# Patient Record
Sex: MALE | Race: WHITE | Employment: OTHER | URBAN - METROPOLITAN AREA
[De-identification: names, ages, dates, MRNs, and addresses within clinical notes are randomized per-mention and may not be internally consistent; named-entity substitution may affect disease eponyms.]

---

## 2017-05-12 PROBLEM — N18.30 CHRONIC KIDNEY DISEASE, STAGE III (MODERATE) (HCC): Chronic | Status: ACTIVE | Noted: 2017-05-12

## 2019-03-24 ENCOUNTER — HOSPITAL ENCOUNTER (EMERGENCY)
Age: 79
Discharge: HOME | End: 2019-03-24
Attending: EMERGENCY MEDICINE
Payer: OTHER GOVERNMENT

## 2019-03-24 VITALS
TEMPERATURE: 99 F | HEIGHT: 70 IN | RESPIRATION RATE: 16 BRPM | DIASTOLIC BLOOD PRESSURE: 82 MMHG | HEART RATE: 80 BPM | BODY MASS INDEX: 30.92 KG/M2 | OXYGEN SATURATION: 98 % | WEIGHT: 216 LBS | SYSTOLIC BLOOD PRESSURE: 172 MMHG

## 2019-03-24 DIAGNOSIS — H57.89 PERIORBITAL SWELLING: Primary | ICD-10-CM

## 2019-03-24 DIAGNOSIS — T78.40XA ALLERGIC REACTION, INITIAL ENCOUNTER: ICD-10-CM

## 2019-03-24 PROCEDURE — 96372 THER/PROPH/DIAG INJ SC/IM: CPT

## 2019-03-24 PROCEDURE — 6370000000 HC RX 637 (ALT 250 FOR IP): Performed by: EMERGENCY MEDICINE

## 2019-03-24 PROCEDURE — 99282 EMERGENCY DEPT VISIT SF MDM: CPT

## 2019-03-24 PROCEDURE — 6360000002 HC RX W HCPCS: Performed by: EMERGENCY MEDICINE

## 2019-03-24 RX ORDER — METHYLPREDNISOLONE SODIUM SUCCINATE 125 MG/2ML
60 INJECTION, POWDER, LYOPHILIZED, FOR SOLUTION INTRAMUSCULAR; INTRAVENOUS ONCE
Status: COMPLETED | OUTPATIENT
Start: 2019-03-24 | End: 2019-03-24

## 2019-03-24 RX ORDER — FAMOTIDINE 20 MG/1
20 TABLET, FILM COATED ORAL ONCE
Status: COMPLETED | OUTPATIENT
Start: 2019-03-24 | End: 2019-03-24

## 2019-03-24 RX ORDER — CLOPIDOGREL BISULFATE 75 MG/1
75 TABLET ORAL DAILY
COMMUNITY
End: 2022-01-25

## 2019-03-24 RX ADMIN — METHYLPREDNISOLONE SODIUM SUCCINATE 60 MG: 125 INJECTION, POWDER, FOR SOLUTION INTRAMUSCULAR; INTRAVENOUS at 14:06

## 2019-03-24 RX ADMIN — FAMOTIDINE 20 MG: 20 TABLET, FILM COATED ORAL at 14:06

## 2020-02-11 ENCOUNTER — OFFICE VISIT (OUTPATIENT)
Dept: NEUROLOGY | Age: 80
End: 2020-02-11
Payer: OTHER GOVERNMENT

## 2020-02-11 VITALS
HEIGHT: 70 IN | DIASTOLIC BLOOD PRESSURE: 86 MMHG | SYSTOLIC BLOOD PRESSURE: 147 MMHG | OXYGEN SATURATION: 97 % | WEIGHT: 223 LBS | HEART RATE: 69 BPM | BODY MASS INDEX: 31.92 KG/M2 | RESPIRATION RATE: 12 BRPM

## 2020-02-11 PROCEDURE — 99205 OFFICE O/P NEW HI 60 MIN: CPT | Performed by: NURSE PRACTITIONER

## 2020-02-11 SDOH — HEALTH STABILITY: MENTAL HEALTH: HOW MANY STANDARD DRINKS CONTAINING ALCOHOL DO YOU HAVE ON A TYPICAL DAY?: 1 OR 2

## 2020-02-11 SDOH — HEALTH STABILITY: MENTAL HEALTH: HOW OFTEN DO YOU HAVE A DRINK CONTAINING ALCOHOL?: MONTHLY OR LESS

## 2020-02-11 NOTE — PROGRESS NOTES
[address] [facility]. Jayshree Dewitt M.D., F.A.C.P. Vidya Johnston, CAMMY, APRN, CNS  Emogene Certain. Gabriela Glynn, MSN, APRN-FNP-C  Jorge Warren MSN, APRN, FNP-C  [address] MSN, APRN, FNP-C  [address], [facility]  [place], [address] [address]  Phone: [phone]  Fax: [phone]       Jony Grajeda is a 78 y.o. right handed male     Patient presents to neurology clinic today for subjective memory deficits. Patient presents to her appointment today alone and was deemed a good historian. Past Medical History:     Past Medical History:   Diagnosis Date    CAD (coronary artery disease)     CHF (congestive heart failure) (HCC)     Chronic atrial fibrillation     on Eliquis    Chronic kidney disease, stage III (moderate) ([facility]) 5/12/2017    COPD (chronic obstructive pulmonary disease) ([address]     Diabetes mellitus ([address]     Essential hypertension 12/12/2015    GERD (gastroesophageal reflux disease)     H. pylori infection     history of    Hx of degenerative disc disease     Hyperlipidemia     MI (myocardial infarction) ([address]     x2-- age 48 and then again in 2007    Osteoarthritis     PFO (patent foramen ovale)     Popliteal cyst     Pulmonary hypertension (HCC)        Past Surgical History:       Past Surgical History:   Procedure Laterality Date    CORONARY ARTERY BYPASS GRAFT      quadruple bypass surgery     MITRAL VALVE REPLACEMENT       Allergies:       Iodine    Medications:     Prior to Admission medications    Medication Sig Start Date End Date Taking? Authorizing Provider   nitroGLYCERIN (NITROSTAT) 0.4 MG SL tablet up to max of 3 total doses.  If no relief after 1 dose, call 911. 5/13/17  Yes Roque Rankin, DO   apixaban (ELIQUIS) 5 MG TABS tablet Take 1 tablet by mouth 2 times daily 5/13/17  Yes Roque Rankin, DO   acetaminophen (TYLENOL) 325 MG tablet Take 325 mg by mouth 4 times daily as needed for Pain   Yes Historical chores and continues to drive. Patient has not mismanage finds, has not forgotten to pay bills, and has not gotten lost while driving. Patient has not forgotten familiar faces of family and friends. Patient has not been in danger to himself or others while cooking or driving. Patient denies swallowing difficulties, headache, dizziness, numbness or tingling of his extremities, or any other neurologic problems. In addition to subjective memory loss, patient has CHF, COPD, CAD, hypertension, hyperlipidemia, chronic A. fib on Eliquis, GERD, diabetes 2, history of heart attack x2, pulmonary hypertension, osteoarthritis and PFO. Patient has history of quadruple bypass after heart attack and mitral valve replacement. Patient's primary care physician is maintained through the [facility]. Patient is a former smoker who quit about 17 years ago. Patient has been  46 years and has no children. Patient says he sleeps 6 to 8 hours waking well rested. Patient drinks 2 glasses of water and 2 glasses of coffee each day. Patient reports a good appetite eating about 2 meals each day. Patient exercises--- walking daily and weightlifting 3-4 times a week. Objective:       BP (!) 147/86 (Site: Left Upper Arm, Position: Sitting, Cuff Size: Medium Adult)   Pulse 69   Resp 12   Ht 5' 9.5\" (1.765 m)   Wt 223 lb (101.2 kg)   SpO2 97%   BMI 32.46 kg/m²     General appearance: alert, appears stated age, cooperative and in no distress  Head: normocephalic, without obvious abnormality, atraumatic  Eyes: conjunctivae/corneas clear; no drainage  Neck: no carotid bruit, supple, symmetrical, trachea midline and thyroid not enlarged, symmetric, no tenderness/mass/nodules  Back: symmetric, no curvature.  ROM normal.   Lungs: clear to auscultation bilaterally; unlabored breaths  Heart: regular rate and rhythm  Abdomen: soft, non-tender; bowel sounds mandeep  Extremities: normal, atraumatic, no cyanosis or edema  Pulses: 2+ and symmetric  Skin:  color, texture, turgor normal--no rashes or lesions    Mental Status: alert and oriented x 4; conversant    Able to draw a clock without difficulty  Able to state current president and 5 past presidents without difficulty    Forgetful--easily distracted which causes difficulty with complex commands  Provides his entire history without difficulty    Appropriate attention/concentration  Intact fundus of knowledge  Memories intact    Speech: no dysarthria  Language: no aphasias---reading, writing, repetition, and object identification intact    Cranial Nerves:  I: smell  intact   II: visual acuity     II: visual fields Full to confrontation   II: pupils PERRL   III,VII: ptosis  left eye   III,IV,VI: extraocular muscles  EOMI without nystagmus   V: mastication Normal   V: facial light touch sensation  Normal   V,VII: corneal reflex     VII: facial muscle function - upper  Normal   VII: facial muscle function - lower Normal   VIII: hearing Normal   IX: soft palate elevation  Normal   IX,X: gag reflex    XI: trapezius strength  5/5   XI: sternocleidomastoid strength 5/5   XI: neck extension strength  5/5   XII: tongue strength  Normal     No suck or grasp reflexes  No masklike feces  No Myerson's  No Gowers    Motor:  5/5 throughout  Normal bulk and tone  No drift   No abnormal movements    Sensory:  LT and PP normal  Vibration normal    Coordination:   FN, FFM and VIVIEN normal  HS normal    Gait:  Normal    DTR:   Right Brachioradialis reflex 1+  Left Brachioradialis reflex 1+  Right Biceps reflex 1+  Left Biceps reflex 1+  Right Triceps reflex 1+  Left Triceps reflex 1+  Right Quadriceps reflex 1+  Left Quadriceps reflex 1+  Right Achilles reflex 1+  Left Achilles reflex 1+    No Garay's    No other pathological reflexes    Laboratory/Radiology:  ry/Radiology:     CBC:   Lab Results   Component Value Date    WBC 11.0 05/11/2017    RBC 3.90 05/11/2017    HGB 11.9 05/11/2017    HCT 36.3 05/11/2017    [facility]

## 2022-01-25 ENCOUNTER — APPOINTMENT (OUTPATIENT)
Dept: GENERAL RADIOLOGY | Age: 82
DRG: 291 | End: 2022-01-25
Payer: OTHER GOVERNMENT

## 2022-01-25 ENCOUNTER — HOSPITAL ENCOUNTER (INPATIENT)
Age: 82
LOS: 13 days | Discharge: HOME | DRG: 291 | End: 2022-02-07
Attending: EMERGENCY MEDICINE | Admitting: FAMILY MEDICINE
Payer: OTHER GOVERNMENT

## 2022-01-25 DIAGNOSIS — R06.00 DYSPNEA, UNSPECIFIED TYPE: ICD-10-CM

## 2022-01-25 DIAGNOSIS — M79.89 LEG SWELLING: ICD-10-CM

## 2022-01-25 DIAGNOSIS — I50.31 ACUTE DIASTOLIC HEART FAILURE (HCC): Primary | ICD-10-CM

## 2022-01-25 DIAGNOSIS — I50.9 ACUTE CONGESTIVE HEART FAILURE, UNSPECIFIED HEART FAILURE TYPE (HCC): ICD-10-CM

## 2022-01-25 LAB
ALBUMIN SERPL-MCNC: 3.5 G/DL (ref 3.5–5.2)
ALP BLD-CCNC: 152 U/L (ref 40–129)
ALT SERPL-CCNC: 9 U/L (ref 0–40)
ANION GAP SERPL CALCULATED.3IONS-SCNC: 8 MMOL/L (ref 7–16)
APTT: 35 SEC (ref 24.5–35.1)
AST SERPL-CCNC: 12 U/L (ref 0–39)
BASOPHILS ABSOLUTE: 0.04 E9/L (ref 0–0.2)
BASOPHILS RELATIVE PERCENT: 0.5 % (ref 0–2)
BILIRUB SERPL-MCNC: 0.7 MG/DL (ref 0–1.2)
BUN BLDV-MCNC: 38 MG/DL (ref 6–23)
CALCIUM SERPL-MCNC: 8.7 MG/DL (ref 8.6–10.2)
CHLORIDE BLD-SCNC: 106 MMOL/L (ref 98–107)
CO2: 27 MMOL/L (ref 22–29)
CREAT SERPL-MCNC: 2.1 MG/DL (ref 0.7–1.2)
EOSINOPHILS ABSOLUTE: 0.46 E9/L (ref 0.05–0.5)
EOSINOPHILS RELATIVE PERCENT: 5.7 % (ref 0–6)
GFR AFRICAN AMERICAN: 37
GFR NON-AFRICAN AMERICAN: 30 ML/MIN/1.73
GLUCOSE BLD-MCNC: 110 MG/DL (ref 74–99)
HCT VFR BLD CALC: 34.1 % (ref 37–54)
HEMOGLOBIN: 10 G/DL (ref 12.5–16.5)
IMMATURE GRANULOCYTES #: 0.04 E9/L
IMMATURE GRANULOCYTES %: 0.5 % (ref 0–5)
INR BLD: 1.5
LYMPHOCYTES ABSOLUTE: 0.9 E9/L (ref 1.5–4)
LYMPHOCYTES RELATIVE PERCENT: 11.2 % (ref 20–42)
MCH RBC QN AUTO: 30 PG (ref 26–35)
MCHC RBC AUTO-ENTMCNC: 29.3 % (ref 32–34.5)
MCV RBC AUTO: 102.4 FL (ref 80–99.9)
MONOCYTES ABSOLUTE: 0.92 E9/L (ref 0.1–0.95)
MONOCYTES RELATIVE PERCENT: 11.4 % (ref 2–12)
NEUTROPHILS ABSOLUTE: 5.71 E9/L (ref 1.8–7.3)
NEUTROPHILS RELATIVE PERCENT: 70.7 % (ref 43–80)
PDW BLD-RTO: 15.6 FL (ref 11.5–15)
PLATELET # BLD: 340 E9/L (ref 130–450)
PMV BLD AUTO: 8.5 FL (ref 7–12)
POTASSIUM REFLEX MAGNESIUM: 4.7 MMOL/L (ref 3.5–5)
PRO-BNP: 6407 PG/ML (ref 0–450)
PROTHROMBIN TIME: 16.5 SEC (ref 9.3–12.4)
RBC # BLD: 3.33 E12/L (ref 3.8–5.8)
SODIUM BLD-SCNC: 141 MMOL/L (ref 132–146)
TOTAL PROTEIN: 7.6 G/DL (ref 6.4–8.3)
TROPONIN, HIGH SENSITIVITY: 50 NG/L (ref 0–11)
WBC # BLD: 8.1 E9/L (ref 4.5–11.5)

## 2022-01-25 PROCEDURE — 6360000002 HC RX W HCPCS: Performed by: EMERGENCY MEDICINE

## 2022-01-25 PROCEDURE — 71045 X-RAY EXAM CHEST 1 VIEW: CPT

## 2022-01-25 PROCEDURE — 96374 THER/PROPH/DIAG INJ IV PUSH: CPT

## 2022-01-25 PROCEDURE — 93005 ELECTROCARDIOGRAM TRACING: CPT | Performed by: EMERGENCY MEDICINE

## 2022-01-25 PROCEDURE — 85025 COMPLETE CBC W/AUTO DIFF WBC: CPT

## 2022-01-25 PROCEDURE — 36415 COLL VENOUS BLD VENIPUNCTURE: CPT

## 2022-01-25 PROCEDURE — 85610 PROTHROMBIN TIME: CPT

## 2022-01-25 PROCEDURE — 99284 EMERGENCY DEPT VISIT MOD MDM: CPT

## 2022-01-25 PROCEDURE — 2060000000 HC ICU INTERMEDIATE R&B

## 2022-01-25 PROCEDURE — 85730 THROMBOPLASTIN TIME PARTIAL: CPT

## 2022-01-25 PROCEDURE — 83880 ASSAY OF NATRIURETIC PEPTIDE: CPT

## 2022-01-25 PROCEDURE — 80053 COMPREHEN METABOLIC PANEL: CPT

## 2022-01-25 PROCEDURE — 84484 ASSAY OF TROPONIN QUANT: CPT

## 2022-01-25 RX ORDER — ISOSORBIDE MONONITRATE 30 MG/1
30 TABLET, EXTENDED RELEASE ORAL DAILY
COMMUNITY

## 2022-01-25 RX ORDER — FUROSEMIDE 10 MG/ML
40 INJECTION INTRAMUSCULAR; INTRAVENOUS ONCE
Status: COMPLETED | OUTPATIENT
Start: 2022-01-25 | End: 2022-01-25

## 2022-01-25 RX ADMIN — FUROSEMIDE 40 MG: 10 INJECTION, SOLUTION INTRAMUSCULAR; INTRAVENOUS at 18:31

## 2022-01-25 ASSESSMENT — PAIN SCALES - WONG BAKER: WONGBAKER_NUMERICALRESPONSE: 2

## 2022-01-25 ASSESSMENT — PAIN DESCRIPTION - LOCATION: LOCATION: OTHER (COMMENT)

## 2022-01-25 ASSESSMENT — PAIN DESCRIPTION - DESCRIPTORS: DESCRIPTORS: PRESSURE

## 2022-01-25 ASSESSMENT — PAIN DESCRIPTION - FREQUENCY: FREQUENCY: CONTINUOUS

## 2022-01-25 ASSESSMENT — PAIN SCALES - GENERAL: PAINLEVEL_OUTOF10: 3

## 2022-01-25 ASSESSMENT — PAIN DESCRIPTION - PAIN TYPE: TYPE: ACUTE PAIN

## 2022-01-25 NOTE — ED PROVIDER NOTES
HPI:  1/25/22,   Time: 6:15 PM LEEANN Rosales is a 80 y.o. male presenting to the ED for leg edema/sob, beginning months ago. The complaint has been persistent, moderate in severity, and worsened by moderate exertion. [facility] em[facility], out of lasix for past 2 weeks, worse, not on home o2. No fever/chills/sweats/n/v/d/cough/congestion/abd pain. Ext up into abd. Nothing makes better    Review of Systems:   Pertinent positives and negatives are stated within HPI, all other systems reviewed and are negative.          --------------------------------------------- PAST HISTORY ---------------------------------------------  Past Medical History:  has a past medical history of CAD (coronary artery disease), CHF (congestive heart failure) ([facility] Chronic atrial fibrillation ([address] Chronic kidney disease, stage III (moderate) ([address] COPD (chronic obstructive pulmonary disease) ([address] Diabetes mellitus ([facility] Essential hypertension, GERD (gastroesophageal reflux disease), H. pylori infection, Hx of degenerative disc disease, Hyperlipidemia, MI (myocardial infarction) ([address] Osteoarthritis, PFO (patent foramen ovale), Popliteal cyst, and Pulmonary hypertension ([address] Past Surgical History:  has a past surgical history that includes Mitral valve replacement and Coronary artery bypass graft. Social History:  reports that he has quit smoking. He has never used smokeless tobacco. He reports current alcohol use of about 1.0 standard drink of alcohol per week. He reports that he does not use drugs. Family History: family history is not on file. The patients home medications have been reviewed.     Allergies: Iodine        ---------------------------------------------------PHYSICAL EXAM--------------------------------------    Constitutional/General: Alert and oriented x3, well appearing, non toxic in NAD  Head: Normocephalic and atraumatic  Eyes: PERRL, EOMI, conjunctive normal, sclera non icteric  Mouth: Oropharynx clear, handling secretions, no trismus, no asymmetry of the posterior oropharynx or uvular edema  Neck: Supple, full ROM, non tender to palpation in the midline, no stridor, no crepitus, no meningeal signs, pos jvd  Respiratory: crackles base fidencio, tachypnic. Cardiovascular:  Regular rate. Regular rhythm. No murmurs, gallops, or rubs. 2+ distal pulses  Chest: No chest wall tenderness  GI:  Abdomen Soft, Non tender, Non distended. +BS. No organomegaly, no palpable masses,  No rebound, guarding, or rigidity. Musculoskeletal: Moves all extremities x 4. Warm and well perfused, no clubbing, cyanosis, 4+ pedal edema. Capillary refill <3 seconds  Integument: skin warm and dry. No rashes. Lymphatic: no lymphadenopathy noted  Neurologic: GCS 15, no focal deficits,  Psychiatric: Normal Affect    -------------------------------------------------- RESULTS -------------------------------------------------  I have personally reviewed all laboratory and imaging results for this patient. Results are listed below.      LABS:  Results for orders placed or performed during the hospital encounter of 01/25/22   CBC Auto Differential   Result Value Ref Range    WBC 8.1 4.5 - 11.5 E9/L    RBC 3.33 (L) 3.80 - 5.80 E12/L    Hemoglobin 10.0 (L) 12.5 - 16.5 g/dL    Hematocrit 34.1 (L) 37.0 - 54.0 %    .4 (H) 80.0 - 99.9 fL    MCH 30.0 26.0 - 35.0 pg    MCHC 29.3 (L) 32.0 - 34.5 %    RDW 15.6 (H) 11.5 - 15.0 fL    Platelets 186 525 - 231 E9/L    MPV 8.5 7.0 - 12.0 fL    Neutrophils % 70.7 43.0 - 80.0 %    Immature Granulocytes % 0.5 0.0 - 5.0 %    Lymphocytes % 11.2 (L) 20.0 - 42.0 %    Monocytes % 11.4 2.0 - 12.0 %    Eosinophils % 5.7 0.0 - 6.0 %    Basophils % 0.5 0.0 - 2.0 %    Neutrophils Absolute 5.71 1.80 - 7.30 E9/L    Immature Granulocytes # 0.04 E9/L    Lymphocytes Absolute 0.90 (L) 1.50 - 4.00 E9/L    Monocytes Absolute 0.92 0.10 - 0.95 E9/L    Eosinophils Absolute 0.46 0.05 - 0.50 E9/L Basophils Absolute 0.04 0.00 - 0.20 E9/L   Comprehensive Metabolic Panel w/ Reflex to MG   Result Value Ref Range    Sodium 141 132 - 146 mmol/L    Potassium reflex Magnesium 4.7 3.5 - 5.0 mmol/L    Chloride 106 98 - 107 mmol/L    CO2 27 22 - 29 mmol/L    Anion Gap 8 7 - 16 mmol/L    Glucose 110 (H) 74 - 99 mg/dL    BUN 38 (H) 6 - 23 mg/dL    CREATININE 2.1 (H) 0.7 - 1.2 mg/dL    GFR Non-African American 30 >=60 mL/min/1.73    GFR African American 37     Calcium 8.7 8.6 - 10.2 mg/dL    Total Protein 7.6 6.4 - 8.3 g/dL    Albumin 3.5 3.5 - 5.2 g/dL    Total Bilirubin 0.7 0.0 - 1.2 mg/dL    Alkaline Phosphatase 152 (H) 40 - 129 U/L    ALT 9 0 - 40 U/L    AST 12 0 - 39 U/L   Troponin   Result Value Ref Range    Troponin, High Sensitivity 50 (H) 0 - 11 ng/L   Brain Natriuretic Peptide   Result Value Ref Range    Pro-BNP 6,407 (H) 0 - 450 pg/mL   Protime-INR   Result Value Ref Range    Protime 16.5 (H) 9.3 - 12.4 sec    INR 1.5    APTT   Result Value Ref Range    aPTT 35.0 24.5 - 35.1 sec       RADIOLOGY:  Interpreted by Radiologist.  XR CHEST PORTABLE   Final Result   Stable cardiomegaly. No acute findings. EKG:  This EKG is signed and interpreted by the EP. Time: 1826  Rate: 66  Rhythm: Atrial fibrillation  Interpretation: atrial fibrillation (chronic)  Comparison: None      ------------------------- NURSING NOTES AND VITALS REVIEWED ---------------------------   The nursing notes within the ED encounter and vital signs as below have been reviewed by myself. /68   Pulse 69   Temp 98.1 °F (36.7 °C)   Resp 18   Ht 5' 9\" (1.753 m)   Wt 245 lb (111.1 kg)   SpO2 94%   BMI 36.18 kg/m²   Oxygen Saturation Interpretation: Normal    The patients available past medical records and past encounters were reviewed.         ------------------------------ ED COURSE/MEDICAL DECISION MAKING----------------------  Medications   furosemide (LASIX) injection 40 mg (40 mg IntraVENous Given 1/25/22 7626) ED COURSE:       Medical Decision Making:    Pt chf sx, out of diuretics, sig weight gain, will admit for diuresis      This patient's ED course included: a personal history and physicial examination    This patient has remained hemodynamically stable during their ED course. Re-Evaluations:             Re-evaluation. Patients symptoms show no change            Consultations:             sound    Critical Care:         Counseling: The emergency provider has spoken with the patient and discussed todays results, in addition to providing specific details for the plan of care and counseling regarding the diagnosis and prognosis. Questions are answered at this time and they are agreeable with the plan.       --------------------------------- IMPRESSION AND DISPOSITION ---------------------------------    IMPRESSION  1. Leg swelling    2. Dyspnea, unspecified type    3. Acute congestive heart failure, unspecified heart failure type ([facility]        DISPOSITION  Disposition: Admit to telemetry  Patient condition is stable    NOTE: This report was transcribed using voice recognition software.  Every effort was made to ensure accuracy; however, inadvertent computerized transcription errors may be present        Jose Marie MD  01/25/22 2056

## 2022-01-26 LAB
ANION GAP SERPL CALCULATED.3IONS-SCNC: 10 MMOL/L (ref 7–16)
BUN BLDV-MCNC: 36 MG/DL (ref 6–23)
CALCIUM SERPL-MCNC: 8.7 MG/DL (ref 8.6–10.2)
CHLORIDE BLD-SCNC: 105 MMOL/L (ref 98–107)
CHOLESTEROL, TOTAL: 111 MG/DL (ref 0–199)
CO2: 26 MMOL/L (ref 22–29)
CREAT SERPL-MCNC: 2 MG/DL (ref 0.7–1.2)
EKG ATRIAL RATE: 70 BPM
EKG Q-T INTERVAL: 420 MS
EKG QRS DURATION: 98 MS
EKG QTC CALCULATION (BAZETT): 440 MS
EKG R AXIS: 64 DEGREES
EKG T AXIS: -179 DEGREES
EKG VENTRICULAR RATE: 66 BPM
GFR AFRICAN AMERICAN: 39
GFR NON-AFRICAN AMERICAN: 32 ML/MIN/1.73
GLUCOSE BLD-MCNC: 102 MG/DL (ref 74–99)
HBA1C MFR BLD: 6.1 % (ref 4–5.6)
HCT VFR BLD CALC: 34.2 % (ref 37–54)
HDLC SERPL-MCNC: 36 MG/DL
HEMOGLOBIN: 10.2 G/DL (ref 12.5–16.5)
IRON SATURATION: 19 % (ref 20–55)
IRON: 51 MCG/DL (ref 59–158)
LDL CHOLESTEROL CALCULATED: 65 MG/DL (ref 0–99)
MAGNESIUM: 2.3 MG/DL (ref 1.6–2.6)
MCH RBC QN AUTO: 30.4 PG (ref 26–35)
MCHC RBC AUTO-ENTMCNC: 29.8 % (ref 32–34.5)
MCV RBC AUTO: 102.1 FL (ref 80–99.9)
METER GLUCOSE: 119 MG/DL (ref 74–99)
METER GLUCOSE: 93 MG/DL (ref 74–99)
PDW BLD-RTO: 15.5 FL (ref 11.5–15)
PLATELET # BLD: 347 E9/L (ref 130–450)
PMV BLD AUTO: 8.6 FL (ref 7–12)
POTASSIUM SERPL-SCNC: 4.8 MMOL/L (ref 3.5–5)
RBC # BLD: 3.35 E12/L (ref 3.8–5.8)
SODIUM BLD-SCNC: 141 MMOL/L (ref 132–146)
TOTAL IRON BINDING CAPACITY: 271 MCG/DL (ref 250–450)
TRIGL SERPL-MCNC: 51 MG/DL (ref 0–149)
TROPONIN, HIGH SENSITIVITY: 52 NG/L (ref 0–11)
TROPONIN, HIGH SENSITIVITY: 52 NG/L (ref 0–11)
VLDLC SERPL CALC-MCNC: 10 MG/DL
WBC # BLD: 7.9 E9/L (ref 4.5–11.5)

## 2022-01-26 PROCEDURE — 83036 HEMOGLOBIN GLYCOSYLATED A1C: CPT

## 2022-01-26 PROCEDURE — 83550 IRON BINDING TEST: CPT

## 2022-01-26 PROCEDURE — 6370000000 HC RX 637 (ALT 250 FOR IP): Performed by: FAMILY MEDICINE

## 2022-01-26 PROCEDURE — 2700000000 HC OXYGEN THERAPY PER DAY

## 2022-01-26 PROCEDURE — 36415 COLL VENOUS BLD VENIPUNCTURE: CPT

## 2022-01-26 PROCEDURE — 2580000003 HC RX 258: Performed by: FAMILY MEDICINE

## 2022-01-26 PROCEDURE — 80048 BASIC METABOLIC PNL TOTAL CA: CPT

## 2022-01-26 PROCEDURE — 6360000002 HC RX W HCPCS: Performed by: FAMILY MEDICINE

## 2022-01-26 PROCEDURE — 84484 ASSAY OF TROPONIN QUANT: CPT

## 2022-01-26 PROCEDURE — 93010 ELECTROCARDIOGRAM REPORT: CPT | Performed by: INTERNAL MEDICINE

## 2022-01-26 PROCEDURE — 2060000000 HC ICU INTERMEDIATE R&B

## 2022-01-26 PROCEDURE — 99223 1ST HOSP IP/OBS HIGH 75: CPT | Performed by: INTERNAL MEDICINE

## 2022-01-26 PROCEDURE — 85027 COMPLETE CBC AUTOMATED: CPT

## 2022-01-26 PROCEDURE — 83735 ASSAY OF MAGNESIUM: CPT

## 2022-01-26 PROCEDURE — 83540 ASSAY OF IRON: CPT

## 2022-01-26 PROCEDURE — 82962 GLUCOSE BLOOD TEST: CPT

## 2022-01-26 PROCEDURE — 94640 AIRWAY INHALATION TREATMENT: CPT

## 2022-01-26 PROCEDURE — 80061 LIPID PANEL: CPT

## 2022-01-26 RX ORDER — SODIUM CHLORIDE 0.9 % (FLUSH) 0.9 %
5-40 SYRINGE (ML) INJECTION EVERY 12 HOURS SCHEDULED
Status: DISCONTINUED | OUTPATIENT
Start: 2022-01-26 | End: 2022-02-07 | Stop reason: HOSPADM

## 2022-01-26 RX ORDER — FUROSEMIDE 10 MG/ML
40 INJECTION INTRAMUSCULAR; INTRAVENOUS 2 TIMES DAILY
Status: DISCONTINUED | OUTPATIENT
Start: 2022-01-26 | End: 2022-01-28

## 2022-01-26 RX ORDER — ALBUTEROL SULFATE 90 UG/1
2 AEROSOL, METERED RESPIRATORY (INHALATION) 4 TIMES DAILY PRN
Status: DISCONTINUED | OUTPATIENT
Start: 2022-01-26 | End: 2022-01-26 | Stop reason: CLARIF

## 2022-01-26 RX ORDER — ACETAMINOPHEN 650 MG/1
650 SUPPOSITORY RECTAL EVERY 6 HOURS PRN
Status: DISCONTINUED | OUTPATIENT
Start: 2022-01-26 | End: 2022-02-07 | Stop reason: HOSPADM

## 2022-01-26 RX ORDER — SODIUM CHLORIDE 0.9 % (FLUSH) 0.9 %
5-40 SYRINGE (ML) INJECTION PRN
Status: DISCONTINUED | OUTPATIENT
Start: 2022-01-26 | End: 2022-02-07 | Stop reason: HOSPADM

## 2022-01-26 RX ORDER — ONDANSETRON 4 MG/1
4 TABLET, ORALLY DISINTEGRATING ORAL EVERY 8 HOURS PRN
Status: DISCONTINUED | OUTPATIENT
Start: 2022-01-26 | End: 2022-02-07 | Stop reason: HOSPADM

## 2022-01-26 RX ORDER — ALBUTEROL SULFATE 2.5 MG/3ML
2.5 SOLUTION RESPIRATORY (INHALATION) 4 TIMES DAILY PRN
Status: DISCONTINUED | OUTPATIENT
Start: 2022-01-26 | End: 2022-02-07 | Stop reason: HOSPADM

## 2022-01-26 RX ORDER — PANTOPRAZOLE SODIUM 40 MG/1
40 TABLET, DELAYED RELEASE ORAL
Status: DISCONTINUED | OUTPATIENT
Start: 2022-01-26 | End: 2022-02-07 | Stop reason: HOSPADM

## 2022-01-26 RX ORDER — POLYETHYLENE GLYCOL 3350 17 G/17G
17 POWDER, FOR SOLUTION ORAL DAILY PRN
Status: DISCONTINUED | OUTPATIENT
Start: 2022-01-26 | End: 2022-02-07 | Stop reason: HOSPADM

## 2022-01-26 RX ORDER — GLIPIZIDE 5 MG/1
5 TABLET ORAL 2 TIMES DAILY WITH MEALS
Status: DISCONTINUED | OUTPATIENT
Start: 2022-01-26 | End: 2022-01-28

## 2022-01-26 RX ORDER — SODIUM CHLORIDE 9 MG/ML
25 INJECTION, SOLUTION INTRAVENOUS PRN
Status: DISCONTINUED | OUTPATIENT
Start: 2022-01-26 | End: 2022-02-07 | Stop reason: HOSPADM

## 2022-01-26 RX ORDER — INSULIN GLARGINE-YFGN 100 [IU]/ML
60 INJECTION, SOLUTION SUBCUTANEOUS NIGHTLY
Status: DISCONTINUED | OUTPATIENT
Start: 2022-01-26 | End: 2022-01-28

## 2022-01-26 RX ORDER — ISOSORBIDE MONONITRATE 30 MG/1
30 TABLET, EXTENDED RELEASE ORAL DAILY
Status: DISCONTINUED | OUTPATIENT
Start: 2022-01-26 | End: 2022-02-07 | Stop reason: HOSPADM

## 2022-01-26 RX ORDER — AMLODIPINE BESYLATE 10 MG/1
10 TABLET ORAL DAILY
Status: DISCONTINUED | OUTPATIENT
Start: 2022-01-26 | End: 2022-01-28

## 2022-01-26 RX ORDER — ONDANSETRON 2 MG/ML
4 INJECTION INTRAMUSCULAR; INTRAVENOUS EVERY 6 HOURS PRN
Status: DISCONTINUED | OUTPATIENT
Start: 2022-01-26 | End: 2022-02-07 | Stop reason: HOSPADM

## 2022-01-26 RX ORDER — METOPROLOL SUCCINATE 50 MG/1
50 TABLET, EXTENDED RELEASE ORAL DAILY
Status: DISCONTINUED | OUTPATIENT
Start: 2022-01-26 | End: 2022-02-07 | Stop reason: HOSPADM

## 2022-01-26 RX ORDER — IPRATROPIUM BROMIDE AND ALBUTEROL SULFATE 2.5; .5 MG/3ML; MG/3ML
1 SOLUTION RESPIRATORY (INHALATION) 3 TIMES DAILY
Status: DISCONTINUED | OUTPATIENT
Start: 2022-01-26 | End: 2022-02-07 | Stop reason: HOSPADM

## 2022-01-26 RX ORDER — VALSARTAN 40 MG/1
40 TABLET ORAL 2 TIMES DAILY
Status: DISCONTINUED | OUTPATIENT
Start: 2022-01-26 | End: 2022-02-07 | Stop reason: HOSPADM

## 2022-01-26 RX ORDER — ACETAMINOPHEN 325 MG/1
650 TABLET ORAL EVERY 6 HOURS PRN
Status: DISCONTINUED | OUTPATIENT
Start: 2022-01-26 | End: 2022-02-07 | Stop reason: HOSPADM

## 2022-01-26 RX ORDER — DOXAZOSIN 2 MG/1
2 TABLET ORAL DAILY
Status: DISCONTINUED | OUTPATIENT
Start: 2022-01-26 | End: 2022-01-30

## 2022-01-26 RX ADMIN — SODIUM CHLORIDE, PRESERVATIVE FREE 10 ML: 5 INJECTION INTRAVENOUS at 21:10

## 2022-01-26 RX ADMIN — VALSARTAN 40 MG: 80 TABLET, FILM COATED ORAL at 21:10

## 2022-01-26 RX ADMIN — DOXAZOSIN 2 MG: 2 TABLET ORAL at 21:10

## 2022-01-26 RX ADMIN — IPRATROPIUM BROMIDE AND ALBUTEROL SULFATE 3 ML: .5; 2.5 SOLUTION RESPIRATORY (INHALATION) at 12:35

## 2022-01-26 RX ADMIN — FUROSEMIDE 40 MG: 10 INJECTION, SOLUTION INTRAMUSCULAR; INTRAVENOUS at 10:23

## 2022-01-26 RX ADMIN — APIXABAN 2.5 MG: 2.5 TABLET, FILM COATED ORAL at 21:10

## 2022-01-26 RX ADMIN — SODIUM CHLORIDE, PRESERVATIVE FREE 10 ML: 5 INJECTION INTRAVENOUS at 10:26

## 2022-01-26 RX ADMIN — FUROSEMIDE 40 MG: 10 INJECTION, SOLUTION INTRAMUSCULAR; INTRAVENOUS at 16:40

## 2022-01-26 ASSESSMENT — PAIN SCALES - GENERAL
PAINLEVEL_OUTOF10: 0

## 2022-01-26 NOTE — ED NOTES
Sent SBAR to unit spoke with Elissa on unit      [facility]  01/26/22 7997

## 2022-01-26 NOTE — CONSULTS
CHIEF COMPLAINT: SOB/CHF    HISTORY OF PRESENT ILLNESS: Patient is a 80 y.o. male seen at the request of No primary care provider on file. and seen in prior inpatient consult by Dr. Zoltan Perla 5/2017. Patient presents with progressive SOB and edema. No CP or angina. PAST MEDICAL/SURGICAL HISTORY:  1. Coronary artery disease. a. Reported MI at age 48 (the patient denies stenting at that time) -- at [facility].  2.  Status post CABG x4 in 2007 at [facility] -- reports have been requested. 3.  Chronic diastolic CHF. a.  12/12/2015 echocardiogram revealed mild LVH, EF of 55%, with mild prolapse of the anterior mitral valve leaflet, moderate MR, mild TR, moderate pulmonary hypertension with an RVSP of 45 mmHg. 4.  Persistent AFib. a. The patient reported that he had an ablation approximately 4-5 years ago at [facility] in [facility]. The patient was previously on Coumadin therapy. However, he reported that it was discontinued approximately 2-3 years ago due to a \"severe GI bleed. \"  Since that time, the patient has refused anticoagulation. b.  [id number] score of at least 5 (vascular disease, diabetes, age, hypertension). 5.  Hypertension. 6.  Hyperlipidemia, on statin therapy. 7.  Diabetes mellitus. 8.  COPD with no home oxygen. 9.  Remote history of tobacco abuse. He quit about 12 years ago and is a 50-pack-year smoker. 10.  History of tonsillectomy. 11.  History of eyelid surgery.     Past Medical History:   Diagnosis Date    CAD (coronary artery disease)     CHF (congestive heart failure) (HCC)     Chronic atrial fibrillation (HCC)     on Eliquis    Chronic kidney disease, stage III (moderate) (HCC) 5/12/2017    [facility]     Diabetes mellitus [facility]     Essential hypertension 12/12/2015    GERD (gastroesophageal reflux disease)     H. pylori infection     history of    Hx of degenerative disc disease     Hyperlipidemia     [facility]     x2-- age 48 and then again in 2007    Osteoarthritis     PFO (patent foramen ovale)     Popliteal cyst     Pulmonary hypertension (HCC)        Patient Active Problem List   Diagnosis    [facility] mellitus [facility]    CAD (coronary artery disease)    [facility] exacerbation [facility]    Morbid obesity due to excess calories ([facility]    CHF exacerbation ([facility]    Essential hypertension    Pure hypercholesterolemia    Acute on chronic diastolic congestive heart failure (HCC)    Chronic a-fib (HCC)    Chronic kidney disease, stage III (moderate) (HCC)    Heart failure (HCC)    Leg swelling       Allergies   Allergen Reactions    Iodine Hives       Current Facility-Administered Medications   Medication Dose Route Frequency Provider Last Rate Last Admin    albuterol sulfate  (90 Base) MCG/ACT inhaler 2 puff  2 puff Inhalation 4x Daily PRN [facility], DO        amLODIPine (NORVASC) tablet 10 mg  10 mg Oral Daily Lonny Congo, DO        apixaban Estella Foot) tablet 2.5 mg  2.5 mg Oral BID Lonny Congo, DO        glipiZIDE (GLUCOTROL) tablet 5 mg  5 mg Oral BID WC [facility], DO        ipratropium-albuterol (DUONEB) nebulizer solution 3 mL  1 vial Nebulization TID Lonny Congo, DO        isosorbide mononitrate (IMDUR) extended release tablet 30 mg  30 mg Oral Daily Lonny Congo, DO        metoprolol succinate (TOPROL XL) extended release tablet 50 mg  50 mg Oral Daily Lonny Congo, DO        insulin glargine (LANTUS) injection vial 60 Units  60 Units SubCUTAneous Nightly Lonny Congo, DO        pantoprazole (PROTONIX) tablet 40 mg  40 mg Oral QAM AC Lonny Congo, DO        doxazosin (CARDURA) tablet 2 mg  2 mg Oral Daily Lonny Congo, DO        valsartan (DIOVAN) tablet 40 mg  40 mg Oral BID Lonny Congo, DO        sodium chloride flush 0.9 % injection 5-40 mL  5-40 mL IntraVENous 2 times per day [facility], DO   10 mL at 01/26/22 1026  sodium chloride flush 0.9 % injection 5-40 mL  5-40 mL IntraVENous PRN Darlis Puneet, DO        0.9 % sodium chloride infusion  25 mL IntraVENous PRN Darlis Puneet, DO        ondansetron (ZOFRAN-ODT) disintegrating tablet 4 mg  4 mg Oral Q8H PRN Darlis Puneet, DO        Or    ondansetron [facility]) injection 4 mg  4 mg IntraVENous Q6H PRN Darlis Puneet, DO        polyethylene glycol (GLYCOLAX) packet 17 g  17 g Oral Daily PRN Darlis Puneet, DO        acetaminophen (TYLENOL) tablet 650 mg  650 mg Oral Q6H PRN Darlis Puneet, DO        Or    acetaminophen (TYLENOL) suppository 650 mg  650 mg Rectal Q6H PRN Darlis Puneet, DO        perflutren lipid microspheres (DEFINITY) injection 1.65 mg  1.5 mL IntraVENous ONCE PRN Darlis Puneet, DO        furosemide (LASIX) injection 40 mg  40 mg IntraVENous BID Darlis Puneet, DO   40 mg at 01/26/22 1023     Current Outpatient Medications   Medication Sig Dispense Refill    isosorbide mononitrate (IMDUR) 30 MG extended release tablet Take 30 mg by mouth daily      nitroGLYCERIN (NITROSTAT) 0.4 MG SL tablet up to max of 3 total doses.  If no relief after 1 dose, call 911. 25 tablet 3    apixaban (ELIQUIS) 5 MG TABS tablet Take 1 tablet by mouth 2 times daily (Patient taking differently: Take 2.5 mg by mouth 2 times daily ) 60 tablet 0    acetaminophen (TYLENOL) 325 MG tablet Take 325 mg by mouth 4 times daily as needed for Pain      diphenhydrAMINE (BENADRYL) 25 MG capsule Take 25 mg by mouth as needed for Itching (itchy hands and feet)      albuterol sulfate HFA (PROAIR HFA) 108 (90 BASE) MCG/ACT inhaler Inhale 2 puffs into the lungs 4 times daily as needed for Wheezing or Shortness of Breath      ipratropium-albuterol (DUONEB) 0.5-2.5 (3) MG/3ML SOLN nebulizer solution Take 1 vial by nebulization 3 times daily      gabapentin (NEURONTIN) 100 MG capsule Take 200 mg by mouth 3 times daily      terazosin (HYTRIN) 2 MG capsule Take 2 mg by mouth nightly      valsartan (DIOVAN) 40 MG tablet Take 40 mg by mouth 2 times daily      metoprolol succinate (TOPROL XL) 100 MG extended release tablet Take 50 mg by mouth daily       amLODIPine (NORVASC) 10 MG tablet Take 10 mg by mouth daily       ergocalciferol (ERGOCALCIFEROL) 42835 UNITS capsule Take 50,000 Units by mouth Every 4 weeks      omeprazole (PRILOSEC) 20 MG capsule Take 40 mg by mouth Daily       insulin glargine (LANTUS) 100 UNIT/ML injection vial Inject 60 Units into the skin nightly      Multiple Vitamins-Minerals (THERAPEUTIC MULTIVITAMIN-MINERALS) tablet Take 1 tablet by mouth daily      aspirin 81 MG EC tablet Take 81 mg by mouth daily      glipiZIDE (GLUCOTROL) 5 MG tablet Take 5 mg by mouth 2 times daily (with meals)         Social History     Socioeconomic History    Marital status:      Spouse name: Not on file    Number of children: Not on file    Years of education: Not on file    Highest education level: Not on file   Occupational History    Not on file   Tobacco Use    Smoking status: Former Smoker    Smokeless tobacco: Never Used    Tobacco comment: quit 17 years   Vaping Use    Vaping Use: Never used   Substance and Sexual Activity    Alcohol use: Yes     Alcohol/week: 1.0 standard drink     Types: 1 Glasses of wine per week     Comment: rarely wine    Drug use: No    Sexual activity: Not on file   Other Topics Concern    Not on file   Social History Narrative    Not on file     Social Determinants of Health     Financial Resource Strain:     Difficulty of Paying Living Expenses: Not on file   Food Insecurity:     Worried About Running Out of Food in the Last Year: Not on file    Nelson of Food in the Last Year: Not on file   Transportation Needs:     Lack of Transportation (Medical): Not on file    Lack of Transportation (Non-Medical):  Not on file   Physical Activity:     Days of Exercise per Week: Not on file    Minutes of Exercise per Session: Not on file Stress:     Feeling of Stress : Not on file   Social Connections:     Frequency of Communication with Friends and Family: Not on file    Frequency of Social Gatherings with Friends and Family: Not on file    Attends Judaism Services: Not on file    Active Member of Clubs or Organizations: Not on file    Attends Club or Organization Meetings: Not on file    Marital Status: Not on file   Intimate Partner Violence:     Fear of Current or Ex-Partner: Not on file    Emotionally Abused: Not on file    Physically Abused: Not on file    Sexually Abused: Not on file   Housing Stability:     Unable to Pay for Housing in the Last Year: Not on file    Number of [place] in the Last Year: Not on file    Unstable Housing in the Last Year: Not on file       History reviewed. No pertinent family history. Review of Systems:   Heart: as above   Lungs: as above   Eyes: denies changes in vision or discharge. Ears: denies changes in hearing or pain. Nose: denies epistaxis or masses   Throat: denies sore throat or trouble swallowing. Neuro: denies numbness, tingling, tremors. Skin: denies rashes or itching. : denies hematuria, dysuria   GI: denies vomiting, diarrhea   Psych: denies mood changed, anxiety, depression. all others negative. Physical Exam   /68   Pulse 76   Temp 98.1 °F (36.7 °C)   Resp 16   Ht 5' 9\" (1.753 m)   Wt 245 lb (111.1 kg)   SpO2 94%   BMI 36.18 kg/m²   Constitutional: Oriented to person, place, and time. Well-developed and well-nourished. No distress. Head: Normocephalic and atraumatic. Eyes: EOM are normal. Pupils are equal, round, and reactive to light. Neck: Normal range of motion. Neck supple. No hepatojugular reflux and no JVD present. Carotid bruit is not present. No tracheal deviation present. No thyromegaly present. Cardiovascular: Normal rate, regular rhythm, normal heart sounds and intact distal pulses.   Exam reveals no gallop and no friction rub.  No murmur heard. Pulmonary/Chest: Effort normal and breath sounds normal. No respiratory distress. No wheezes. No rales. No tenderness. Abdominal: Soft. Bowel sounds are normal. No distension and no mass. No tenderness. No rebound and no guarding. Musculoskeletal: Normal range of motion. No edema and no tenderness. Lymphadenopathy:   No cervical adenopathy. No groin adenopathy. Neurological: Alert and oriented to person, place, and time. Skin: Skin is warm and dry. No rash noted. Not diaphoretic. No erythema. Psychiatric: Normal mood and affect. Behavior is normal.     CBC:   Lab Results   Component Value Date    WBC 7.9 01/26/2022    RBC 3.35 01/26/2022    HGB 10.2 01/26/2022    HCT 34.2 01/26/2022    .1 01/26/2022    MCH 30.4 01/26/2022    MCHC 29.8 01/26/2022    RDW 15.5 01/26/2022     01/26/2022    MPV 8.6 01/26/2022     BMP:   Lab Results   Component Value Date     01/26/2022    K 4.8 01/26/2022    K 4.7 01/25/2022     01/26/2022    CO2 26 01/26/2022    BUN 36 01/26/2022    LABALBU 3.5 01/25/2022    CREATININE 2.0 01/26/2022    CALCIUM 8.7 01/26/2022    GFRAA 39 01/26/2022    LABGLOM 32 01/26/2022     Magnesium:    Lab Results   Component Value Date    MG 2.3 01/26/2022     Cardiac Enzymes:   Lab Results   Component Value Date    CKTOTAL 165 05/11/2017    CKTOTAL 134 05/11/2017    CKMB 1.4 05/11/2017    CKMB 1.3 05/11/2017    TROPHS 52 (H) 01/26/2022    TROPHS 52 (H) 01/26/2022    TROPHS 50 (H) 01/25/2022      PT/INR:    Lab Results   Component Value Date    PROTIME 16.5 01/25/2022    INR 1.5 01/25/2022     TSH:    Lab Results   Component Value Date    TSH 0.548 12/12/2015     Rhythm Strip: atrial fibrillation. EKG:  nonspecific ST and T waves changes, atrial fibrillation.     ASSESSMENT AND PLAN:  Patient Active Problem List   Diagnosis    [facility]    CAD (coronary artery disease)    [facility]    Morbid obesity due to excess calories [facility]    CHF exacerbation ([facility]    Essential hypertension    Pure hypercholesterolemia    Acute on chronic diastolic congestive heart failure (HCC)    Chronic a-fib (HCC)    Chronic kidney disease, stage III (moderate) (HCC)    Heart failure (HCC)    Leg swelling     1. Acute on chronic diastolic CHF:    Echo ordered. Diurese. BB/ARB/IV lasix. 2. CAD-CABG at [facility] 2007/Elevated troponin:    Medically manage. Pharm stress to risk stratify. BB/imdur. Consider statin. No ASA due to eliquis. 3. VHD: Hx of MVP with moderate MR and moderate PH. Echo ordered. Observe. 4. Chronic Afib: Dose adjusted eliquis. BB.     5. CKD: Follow labs. 6. HTN: Observe. 7. Lipids: Statin. 8. DM    9. COPD    10. Anemia: Follow labs. 11. Hx of ARON Bailon D.O.   Cardiologist  Cardiology, [facility]

## 2022-01-26 NOTE — H&P
Hospitalist History & Physical      PCP: No primary care provider on file. Date of Service: Pt seen/examined on 1/25/2022     Chief Complaint:  had concerns including Leg Swelling (sent in from [place] for increased swelling in legs, thighs, & abdomen & SOB w exertion) and Shortness of Breath. History Of Present Illness:    Mr. Marline Menon, a 80y.o. year old male  who  has a past medical history of CAD (coronary artery disease), CHF (congestive heart failure) ([address] Chronic atrial fibrillation ([address] Chronic kidney disease, stage III (moderate) ([address] COPD (chronic obstructive pulmonary disease) ([address] Diabetes mellitus ([address] Essential hypertension, GERD (gastroesophageal reflux disease), H. pylori infection, Hx of degenerative disc disease, Hyperlipidemia, MI (myocardial infarction) ([address] Osteoarthritis, PFO (patent foramen ovale), Popliteal cyst, and Pulmonary hypertension ([address] Patient presented to the emergency department with complaints of shortness of breath and swelling of the legs. Also notes weight gain. Denies fever, chills, nausea, vomiting, chest pain, abdominal pain. Shortness of breath is worse with exertion. Has been out of his Lasix for the last 2 weeks. Vital signs within normal limits and stable. Currently 98% on 2 L nasal cannula. Laboratory studies reveal BUN 38, creatinine 2.1, proBNP 6407, troponin of 50, hemoglobin 10.0. Chest x-ray shows stable cardiomegaly. Bibasilar crackles on exam.  Patient given Lasix. Medicine consulted for admission.       Past Medical History:   Diagnosis Date    CAD (coronary artery disease)     CHF (congestive heart failure) (HCC)     Chronic atrial fibrillation (HCC)     on Eliquis    Chronic kidney disease, stage III (moderate) (HCC) 5/12/2017    COPD (chronic obstructive pulmonary disease) ([address]     Diabetes mellitus ([address]     Essential hypertension 12/12/2015    GERD (gastroesophageal reflux disease)     H. pylori infection history of    Hx of degenerative disc disease     Hyperlipidemia     [facility]) [facility]     x2-- age 48 and then again in 2007    Osteoarthritis     PFO (patent foramen ovale)     Popliteal cyst     Pulmonary hypertension (HCC)        Past Surgical History:   Procedure Laterality Date    CORONARY ARTERY BYPASS GRAFT      quadruple bypass surgery     MITRAL VALVE REPLACEMENT         Prior to Admission medications    Medication Sig Start Date End Date Taking? Authorizing Provider   isosorbide mononitrate (IMDUR) 30 MG extended release tablet Take 30 mg by mouth daily   Yes Historical Provider, MD   nitroGLYCERIN (NITROSTAT) 0.4 MG SL tablet up to max of 3 total doses.  If no relief after 1 dose, call 911. 5/13/17  Yes Pedro Rankin, DO   apixaban (ELIQUIS) 5 MG TABS tablet Take 1 tablet by mouth 2 times daily  Patient taking differently: Take 2.5 mg by mouth 2 times daily  5/13/17  Yes Bk Johnston, DO   acetaminophen (TYLENOL) 325 MG tablet Take 325 mg by mouth 4 times daily as needed for Pain   Yes Historical Provider, MD   diphenhydrAMINE (BENADRYL) 25 MG capsule Take 25 mg by mouth as needed for Itching (itchy hands and feet)   Yes Historical Provider, MD   albuterol sulfate HFA (PROAIR HFA) 108 (90 BASE) MCG/ACT inhaler Inhale 2 puffs into the lungs 4 times daily as needed for Wheezing or Shortness of Breath   Yes Historical Provider, MD   ipratropium-albuterol (DUONEB) 0.5-2.5 (3) MG/3ML SOLN nebulizer solution Take 1 vial by nebulization 3 times daily   Yes Historical Provider, MD   gabapentin (NEURONTIN) 100 MG capsule Take 200 mg by mouth 3 times daily   Yes Historical Provider, MD   terazosin (HYTRIN) 2 MG capsule Take 2 mg by mouth nightly   Yes Historical Provider, MD   valsartan (DIOVAN) 40 MG tablet Take 40 mg by mouth 2 times daily   Yes Historical Provider, MD   metoprolol succinate (TOPROL XL) 100 MG extended release tablet Take 50 mg by mouth daily    Yes Historical Provider, MD   amLODIPine (NORVASC) 10 MG tablet Take 10 mg by mouth daily    Yes Historical Provider, MD   ergocalciferol (ERGOCALCIFEROL) 69449 UNITS capsule Take 50,000 Units by mouth Every 4 weeks   Yes Historical Provider, MD   omeprazole (PRILOSEC) 20 MG capsule Take 40 mg by mouth Daily    Yes Historical Provider, MD   insulin glargine (LANTUS) 100 UNIT/ML injection vial Inject 60 Units into the skin nightly   Yes Historical Provider, MD   Multiple Vitamins-Minerals (THERAPEUTIC MULTIVITAMIN-MINERALS) tablet Take 1 tablet by mouth daily   Yes Historical Provider, MD   aspirin 81 MG EC tablet Take 81 mg by mouth daily   Yes Historical Provider, MD   glipiZIDE (GLUCOTROL) 5 MG tablet Take 5 mg by mouth 2 times daily (with meals)   Yes Historical Provider, MD         Allergies:  Iodine    Social History:    TOBACCO:   reports that he has quit smoking. He has never used smokeless tobacco.  ETOH:   reports current alcohol use of about 1.0 standard drink of alcohol per week. Family History:    Reviewed in detail and negative for DM, CAD, Cancer, CVA. Positive as follows\"  History reviewed. No pertinent family history. REVIEW OF SYSTEMS:   Pertinent positives as noted in the HPI. All other systems reviewed and negative. PHYSICAL EXAM:  /68   Pulse 69   Temp 98.1 °F (36.7 °C)   Resp 18   Ht 5' 9\" (1.753 m)   Wt 245 lb (111.1 kg)   SpO2 94%   BMI 36.18 kg/m²   General appearance: No apparent distress, appears stated age and cooperative. HEENT: Normal cephalic, atraumatic without obvious deformity. Pupils equal, round, and reactive to light. Extra ocular muscles intact. Conjunctivae/corneas clear. Neck: Supple, with full range of motion. No jugular venous distention. Trachea midline. Respiratory: Bibasilar crackles  Cardiovascular: Regular rate and rhythm  Abdomen: Soft, nontender, nondistended  Musculoskeletal: No clubbing, cyanosis, edema of bilateral lower extremities.  Brisk capillary refill. Skin: Normal skin color. No rashes or lesions. Neurologic:  Neurovascularly intact without any focal sensory/motor deficits. Cranial nerves: II-XII intact, grossly non-focal.    Reviewed EKG and CXR personally      CBC:   Recent Labs     01/25/22 1823   WBC 8.1   RBC 3.33*   HGB 10.0*   HCT 34.1*   .4*   RDW 15.6*        BMP:   Recent Labs     01/25/22 1823      K 4.7      CO2 27   BUN 38*   CREATININE 2.1*     LFT:  Recent Labs     01/25/22 1823   PROT 7.6   ALKPHOS 152*   ALT 9   AST 12   BILITOT 0.7     CE:  No results for input(s): Meldon Arrieta in the last 72 hours. PT/INR:   Recent Labs     01/25/22 1823   INR 1.5   APTT 35.0     BNP: No results for input(s): BNP in the last 72 hours. ESR:   Lab Results   Component Value Date    SEDRATE 29 (H) 12/12/2015     CRP:   Lab Results   Component Value Date    CRP 2.2 (H) 12/12/2015     D Dimer: No results found for: DDIMER   Folate and B12:   Lab Results   Component Value Date    LAHYJVNW05 434 12/12/2015   ,   Lab Results   Component Value Date    FOLATE >20.0 12/12/2015     Lactic Acid:   Lab Results   Component Value Date    LACTA 1.2 05/11/2017     Thyroid Studies:   Lab Results   Component Value Date    TSH 0.548 12/12/2015       Oupatient labs:  Lab Results   Component Value Date    TSH 0.548 12/12/2015    INR 1.5 01/25/2022    LABA1C 7.6 (H) 05/11/2017       Urinalysis:  No results found for: NITRU, WBCUA, BACTERIA, RBCUA, BLOODU, SPECGRAV, GLUCOSEU    Imaging:  XR CHEST PORTABLE    Result Date: 1/25/2022  EXAMINATION: ONE XRAY VIEW OF THE CHEST 1/25/2022 7:05 pm COMPARISON: 5/11/7 HISTORY: ORDERING SYSTEM PROVIDED HISTORY: sob/leg edema TECHNOLOGIST PROVIDED HISTORY: Reason for exam:->sob/leg edema What reading provider will be dictating this exam?->CRC FINDINGS: The lungs are without acute focal process. There is no effusion or pneumothorax. Stable cardiomegaly.   The osseous structures are without acute process. Sternotomy wires noted. IMPRESSION: No acute process. Stable cardiomegaly. No acute findings. ASSESSMENT:  -Acute on chronic congestive heart failure  -Acute on chronic renal failure  -Elevated troponin  -Bilateral lower extremity edema  -History of atrial fibrillation  -COPD  -History of coronary artery disease  -Type 2 diabetes      PLAN:  -Admit to medicine   -Consult cardiology  -Telemetry  -Echocardiogram  -Repeat troponin  -Lasix 40 mg twice daily  -Daily weight and I's and O's  -Continue home medications        Diet: No diet orders on file  Code Status: Prior  Surrogate decision maker confirmed with patient:   Extended Emergency Contact Information  Primary Emergency Contact: Alexa Michele  Address: [address] [place] [address] Megan Najjar [address] Phone: [phone]  Mobile Phone: [phone]  Relation: Spouse   needed? No    DVT Prophylaxis: []Lovenox []Heparin []PCD [] [address] []Encouraged ambulation  Disposition: []Med/Surg [] Intermediate [] ICU/CCU  Admit status: [] Observation [] Inpatient     +++++++++++++++++++++++++++++++++++++++++++++++++  Ethel Mcgovern DO  [address]  +++++++++++++++++++++++++++++++++++++++++++++++++  NOTE: This report was transcribed using voice recognition software. Every effort was made to ensure accuracy; however, inadvertent computerized transcription errors may be present.

## 2022-01-26 NOTE — PROGRESS NOTES
Hospitalist Progress Note      SYNOPSIS: Patient admitted on 2022 for <principal problem not specified>  Mr. Mana Moser, a 80y.o. year old male  who  has a past medical history of CAD (coronary artery disease), CHF (congestive heart failure) ([facility] Chronic atrial fibrillation ([address] Chronic kidney disease, stage III (moderate) ([address] COPD (chronic obstructive pulmonary disease) ([facility] Diabetes mellitus ([address] Essential hypertension, GERD (gastroesophageal reflux disease), H. pylori infection, Hx of degenerative disc disease, Hyperlipidemia, MI (myocardial infarction) ([facility] Osteoarthritis, PFO (patent foramen ovale), Popliteal cyst, and Pulmonary hypertension ([facility]    Patient presented to the emergency department with complaints of shortness of breath and swelling of the legs. Also notes weight gain. Denies fever, chills, nausea, vomiting, chest pain, abdominal pain. Shortness of breath is worse with exertion. Has been out of his Lasix for the last 2 weeks. Vital signs within normal limits and stable. Currently 98% on 2 L nasal cannula. Laboratory studies reveal BUN 38, creatinine 2.1, proBNP 6407, troponin of 50, hemoglobin 10.0. Chest x-ray shows stable cardiomegaly. Bibasilar crackles on exam.  Patient given Lasix. Medicine consulted for admission.          SUBJECTIVE:   Patient seen and examined at bedside. He still short of breath but improving overnight with IV diuresis. He denies any chest. Cardiologist ordered stress test  Records reviewed. Stable overnight. No other overnight issues reported. Temp (24hrs), Av.1 °F (36.7 °C), Min:98.1 °F (36.7 °C), Max:98.1 °F (36.7 °C)    DIET: ADULT DIET; Regular;  Low Sodium (2 gm)  CODE: Full Code    Intake/Output Summary (Last 24 hours) at 2022 7365  Last data filed at 2022 1026  Gross per 24 hour   Intake 10 ml   Output --   Net 10 ml       OBJECTIVE:    /68   Pulse 76   Temp 98.1 °F (36.7 °C)   Resp 16   Ht 5' 9\" (1.753 m)   Wt 245 lb (111.1 kg)   SpO2 94%   BMI 36.18 kg/m²     General appearance: No apparent distress, appears stated age and cooperative. HEENT:  Conjunctivae/corneas clear. Neck: Supple. No jugular venous distention. Respiratory: Clear to auscultation bilaterally, normal respiratory effort  Cardiovascular: Regular rate rhythm, normal S1-S2. Bilateral lower extremity edema  Abdomen: Soft, nontender, nondistended  Musculoskeletal: No clubbing, cyanosis, no bilateral lower extremity edema. Brisk capillary refill.    Skin:  No rashes  on visible skin  Neurologic: awake, alert and following commands     Assessment and plan:    #Acute on chronic diastolic CHF:  -Resume IV diuresis with Lasix 40 mg twice daily  -Resume beta-blockers and valsartan  -2D echo ordered  -Cardiology input appreciated     #CAD-CABG at [facility] 2007/Elevated troponin:  -Nuclear stress test ordered by cardiology  -Resume beta-blocker Imdur     #Valvular heart disease    -Hx of MVP with moderate MR and moderate PH.   -Echo pending     #Chronic Afib:   -Rate controlled resume metoprolol and Eliquis     #CKD stage III  -Avoid nephrotoxic agent  -Consult nephrology while diuresing the patient      #Hypertension  -Controlled     #Dyslipidemia     #Type 2 diabetes mellitus  -On oral hypoglycemic and insulin  -Check A1c     #COPD without exacerbation     #Anemia of chronic kidney disease    #History of GI bleed  -Monitor hemoglobin    #DVT prophylaxis patient already on Eliquis      DISPOSITION: To be determined     Medications:  REVIEWED DAILY    Infusion Medications    sodium chloride       Scheduled Medications    amLODIPine  10 mg Oral Daily    apixaban  2.5 mg Oral BID    glipiZIDE  5 mg Oral BID WC    ipratropium-albuterol  1 vial Nebulization TID    isosorbide mononitrate  30 mg Oral Daily    metoprolol succinate  50 mg Oral Daily    insulin glargine-yfgn  60 Units SubCUTAneous Nightly    pantoprazole  40 mg Oral QAM AC    doxazosin  2 mg Oral Daily    valsartan  40 mg Oral BID    sodium chloride flush  5-40 mL IntraVENous 2 times per day    furosemide  40 mg IntraVENous BID     PRN Meds: sodium chloride flush, sodium chloride, ondansetron **OR** ondansetron, polyethylene glycol, acetaminophen **OR** acetaminophen, perflutren lipid microspheres, [START ON 1/27/2022] regadenoson, albuterol    Labs:     Recent Labs     01/25/22  1823 01/26/22  0707   WBC 8.1 7.9   HGB 10.0* 10.2*   HCT 34.1* 34.2*    347       Recent Labs     01/25/22  1823 01/26/22  0225    141   K 4.7 4.8    105   CO2 27 26   BUN 38* 36*   CREATININE 2.1* 2.0*   CALCIUM 8.7 8.7       Recent Labs     01/25/22  1823   PROT 7.6   ALKPHOS 152*   ALT 9   AST 12   BILITOT 0.7       Recent Labs     01/25/22 1823   INR 1.5       No results for input(s): CKTOTAL, TROPONINI in the last 72 hours. Chronic labs:    Lab Results   Component Value Date    CHOL 111 01/26/2022    TRIG 51 01/26/2022    HDL 36 01/26/2022    LDLCALC 65 01/26/2022    TSH 0.548 12/12/2015    INR 1.5 01/25/2022    LABA1C 7.6 (H) 05/11/2017       Radiology: REVIEWED DAILY    +++++++++++++++++++++++++++++++++++++++++++++++++  Nisha Salazar MD  [facility] Physician - [address] [facility], [place]  +++++++++++++++++++++++++++++++++++++++++++++++++  NOTE: This report was transcribed using voice recognition software. Every effort was made to ensure accuracy; however, inadvertent computerized transcription errors may be present.

## 2022-01-26 NOTE — CONSULTS
Nephrology Consult  [facility]  Mario Mancera MD    Cc: arf    HPI:   The pt is an 79 yo male with a pmh of cad sp acb, dm, copd, htn, pfo,  hyperlipidemia, ckd , afib, p htn who presented with sob and swelling and weight gain. He ran out of his lasix 2 months ago. Labs show cr of 2.1, k 4.7, co2 27, bun 38, cr 2.1, ca 8.7, wbc 7.9, hgb 10.2, plt 347. Baseline cr is 1.8 from 5/2017. He has been started on iv lasix q 12. He is on diovan as an outpt.  [facility].     PMH:    Past Medical History:   Diagnosis Date    CAD (coronary artery disease)     CHF (congestive heart failure) ([address]     Chronic atrial fibrillation ([address]     on Eliquis    Chronic kidney disease, stage III (moderate) ([address] 5/12/2017    COPD (chronic obstructive pulmonary disease) ([address]     Diabetes mellitus ([address]     Essential hypertension 12/12/2015    GERD (gastroesophageal reflux disease)     H. pylori infection     history of    Hx of degenerative disc disease     Hyperlipidemia     MI (myocardial infarction) ([address]     x2-- age 48 and then again in 2007    Osteoarthritis     PFO (patent foramen ovale)     Popliteal cyst     Pulmonary hypertension ([address]        Patient Active Problem List   Diagnosis    Diabetes mellitus ([address]    CAD (coronary artery disease)    COPD exacerbation ([address]    Morbid obesity due to excess calories ([address]    CHF exacerbation (HCC)    Essential hypertension    Pure hypercholesterolemia    Acute on chronic diastolic congestive heart failure (HCC)    Chronic a-fib (HCC)    Chronic kidney disease, stage III (moderate) (HCC)    Heart failure (HCC)    Leg swelling       Meds:     amLODIPine  10 mg Oral Daily    apixaban  2.5 mg Oral BID    glipiZIDE  5 mg Oral BID WC    ipratropium-albuterol  1 vial Nebulization TID    isosorbide mononitrate  30 mg Oral Daily    metoprolol succinate  50 mg Oral Daily    insulin glargine-yfgn  60 Units SubCUTAneous Nightly    pantoprazole  40 mg Oral QAM AC    doxazosin  2 mg Oral Daily    valsartan  40 mg Oral BID    sodium chloride flush  5-40 mL IntraVENous 2 times per day    furosemide  40 mg IntraVENous BID        sodium chloride         Meds prn:     sodium chloride flush, sodium chloride, ondansetron **OR** ondansetron, polyethylene glycol, acetaminophen **OR** acetaminophen, perflutren lipid microspheres, [START ON 1/27/2022] regadenoson, albuterol    Meds prior to admission:     No current facility-administered medications on file prior to encounter. Current Outpatient Medications on File Prior to Encounter   Medication Sig Dispense Refill    isosorbide mononitrate (IMDUR) 30 MG extended release tablet Take 30 mg by mouth daily      nitroGLYCERIN (NITROSTAT) 0.4 MG SL tablet up to max of 3 total doses.  If no relief after 1 dose, call 911. 25 tablet 3    apixaban (ELIQUIS) 5 MG TABS tablet Take 1 tablet by mouth 2 times daily (Patient taking differently: Take 2.5 mg by mouth 2 times daily ) 60 tablet 0    acetaminophen (TYLENOL) 325 MG tablet Take 325 mg by mouth 4 times daily as needed for Pain      diphenhydrAMINE (BENADRYL) 25 MG capsule Take 25 mg by mouth as needed for Itching (itchy hands and feet)      albuterol sulfate HFA (PROAIR HFA) 108 (90 BASE) MCG/ACT inhaler Inhale 2 puffs into the lungs 4 times daily as needed for Wheezing or Shortness of Breath      ipratropium-albuterol (DUONEB) 0.5-2.5 (3) MG/3ML SOLN nebulizer solution Take 1 vial by nebulization 3 times daily      gabapentin (NEURONTIN) 100 MG capsule Take 200 mg by mouth 3 times daily      terazosin (HYTRIN) 2 MG capsule Take 2 mg by mouth nightly      valsartan (DIOVAN) 40 MG tablet Take 40 mg by mouth 2 times daily      metoprolol succinate (TOPROL XL) 100 MG extended release tablet Take 50 mg by mouth daily       amLODIPine (NORVASC) 10 MG tablet Take 10 mg by mouth daily       ergocalciferol (ERGOCALCIFEROL) 43902 UNITS capsule Take 50,000 Units by mouth Every 4 weeks      omeprazole (PRILOSEC) 20 MG capsule Take 40 mg by mouth Daily       insulin glargine (LANTUS) 100 UNIT/ML injection vial Inject 60 Units into the skin nightly      Multiple Vitamins-Minerals (THERAPEUTIC MULTIVITAMIN-MINERALS) tablet Take 1 tablet by mouth daily      aspirin 81 MG EC tablet Take 81 mg by mouth daily      glipiZIDE (GLUCOTROL) 5 MG tablet Take 5 mg by mouth 2 times daily (with meals)         Allergies:    Iodine    Social History:     reports that he has quit smoking. He has never used smokeless tobacco. He reports current alcohol use of about 1.0 standard drink of alcohol per week. He reports that he does not use drugs. Family History:     History reviewed. No pertinent family history.     ROS:     General: no fever, chills   Heent: no nasal congestion, sore throat   Resp: no cough, sob , hemoptysis  Cardiac: co sob, wt gain, le edema  Gi: no nausea, vomiting, melena, abd pain, hematemesis  Gu: no hematuria, dysuria   Neruo: no numbness, weakness, headache, blurry vision   Endocrine:  no h/o dm  Derm: no rash , petechia  Heme: no epistaxis, bruising  All other sx negative     Physical Exam:      Patient Vitals for the past 24 hrs:   BP Temp Pulse Resp SpO2 Height Weight   01/26/22 0219 133/68 -- 76 16 94 % -- --   01/25/22 1800 -- -- -- -- 94 % -- --   01/25/22 1751 131/68 98.1 °F (36.7 °C) 69 18 98 % 5' 9\" (1.753 m) 245 lb (111.1 kg)         Intake/Output Summary (Last 24 hours) at 1/26/2022 1613  Last data filed at 1/26/2022 1026  Gross per 24 hour   Intake 10 ml   Output --   Net 10 ml       Constitutional: Patient in no acute distress   Head: normocephalic, atraumatic   Neck: supple, no jvd  Cardiovascular: regular rate and rhythm, no murmurs, gallops, or rubs   Respiratory: decreased at bases  Gastrointestinal: soft, nontender, nondistended, no hepatosplenomegaly  Ext: edema  Neuro:  Skin: dry, no rash   Back: nontender    Data:    Recent Labs     01/25/22  1823 01/26/22  0707   WBC 8.1 7.9   HGB 10.0* 10.2*   HCT 34.1* 34.2*   .4* 102.1*    347       Recent Labs     01/25/22  1823 01/26/22  0225    141   K 4.7 4.8    105   CO2 27 26   CREATININE 2.1* 2.0*   BUN 38* 36*   LABGLOM 30 32   GLUCOSE 110* 102*   CALCIUM 8.7 8.7   MG  --  2.3       No results found for: VITD25    PTH   Date Value Ref Range Status   12/12/2015 100 (H) 15 - 65 pg/mL Final       Recent Labs     01/25/22  1823   ALT 9   AST 12   ALKPHOS 152*   BILITOT 0.7       Recent Labs     01/25/22  1823   LABALBU 3.5       Iron   Date Value Ref Range Status   01/26/2022 51 (L) 59 - 158 mcg/dL Final     TIBC   Date Value Ref Range Status   01/26/2022 271 250 - 450 mcg/dL Final       Vitamin B-12   Date Value Ref Range Status   12/12/2015 434 211 - 946 pg/mL Final       Folate   Date Value Ref Range Status   12/12/2015 >20.0 7.3 - 26.1 ng/mL Final         No results found for: VOL, APPEARANCE, COLORU, LABSPEC, LABPH, LEUKBLD, NITRU, GLUCOSEU, KETUA, UROBILINOGEN, KETUA, UROBILINOGEN, BILIRUBINUR, OCBU    No results found for: STEPHANIE, CREURRAN, MACREATRATIO, OSMOU    No components found for: URIC    No results found for: LIPIDPAN      Assessment and Plan:    1. arf on ckd 3b  Baseline cr 1.8 from 5/2017  Cr 2.1>2  In setting of decompensated heart failure  On lasix q 12  Ran out of home meds  Follow on the diovan  Cr is near baseline    2. Volume overload  H/o mvp mod mr and mod p htn  Continue iv lasix  Follow cr  Card seeing  Awaits echo    3. H/o cad sp acb  Awaits stress    4. htn  Follow on diovan, toprol    5.anemia  Dose monique if hgb <10  Check fe b12 david Smith.  Ben Red MD

## 2022-01-27 ENCOUNTER — APPOINTMENT (OUTPATIENT)
Dept: NUCLEAR MEDICINE | Age: 82
DRG: 291 | End: 2022-01-27
Payer: OTHER GOVERNMENT

## 2022-01-27 ENCOUNTER — APPOINTMENT (OUTPATIENT)
Dept: NON INVASIVE DIAGNOSTICS | Age: 82
DRG: 291 | End: 2022-01-27
Payer: OTHER GOVERNMENT

## 2022-01-27 LAB
ANION GAP SERPL CALCULATED.3IONS-SCNC: 12 MMOL/L (ref 7–16)
BASOPHILS ABSOLUTE: 0.03 E9/L (ref 0–0.2)
BASOPHILS RELATIVE PERCENT: 0.3 % (ref 0–2)
BUN BLDV-MCNC: 37 MG/DL (ref 6–23)
CALCIUM SERPL-MCNC: 9 MG/DL (ref 8.6–10.2)
CHLORIDE BLD-SCNC: 105 MMOL/L (ref 98–107)
CO2: 26 MMOL/L (ref 22–29)
CREAT SERPL-MCNC: 1.8 MG/DL (ref 0.7–1.2)
EOSINOPHILS ABSOLUTE: 0.15 E9/L (ref 0.05–0.5)
EOSINOPHILS RELATIVE PERCENT: 1.5 % (ref 0–6)
FERRITIN: 89 NG/ML
FOLATE: 11.8 NG/ML (ref 4.8–24.2)
GFR AFRICAN AMERICAN: 44
GFR NON-AFRICAN AMERICAN: 36 ML/MIN/1.73
GLUCOSE BLD-MCNC: 110 MG/DL (ref 74–99)
HCT VFR BLD CALC: 33.5 % (ref 37–54)
HEMOGLOBIN: 9.9 G/DL (ref 12.5–16.5)
IMMATURE GRANULOCYTES #: 0.05 E9/L
IMMATURE GRANULOCYTES %: 0.5 % (ref 0–5)
LV EF: 62 %
LVEF MODALITY: NORMAL
LYMPHOCYTES ABSOLUTE: 0.67 E9/L (ref 1.5–4)
LYMPHOCYTES RELATIVE PERCENT: 6.8 % (ref 20–42)
MAGNESIUM: 2.3 MG/DL (ref 1.6–2.6)
MCH RBC QN AUTO: 30.2 PG (ref 26–35)
MCHC RBC AUTO-ENTMCNC: 29.6 % (ref 32–34.5)
MCV RBC AUTO: 102.1 FL (ref 80–99.9)
METER GLUCOSE: 106 MG/DL (ref 74–99)
MONOCYTES ABSOLUTE: 1.13 E9/L (ref 0.1–0.95)
MONOCYTES RELATIVE PERCENT: 11.4 % (ref 2–12)
NEUTROPHILS ABSOLUTE: 7.88 E9/L (ref 1.8–7.3)
NEUTROPHILS RELATIVE PERCENT: 79.5 % (ref 43–80)
PDW BLD-RTO: 15.6 FL (ref 11.5–15)
PLATELET # BLD: 364 E9/L (ref 130–450)
PMV BLD AUTO: 8.9 FL (ref 7–12)
POTASSIUM SERPL-SCNC: 4.4 MMOL/L (ref 3.5–5)
RBC # BLD: 3.28 E12/L (ref 3.8–5.8)
SODIUM BLD-SCNC: 143 MMOL/L (ref 132–146)
VITAMIN B-12: 349 PG/ML (ref 211–946)
WBC # BLD: 9.9 E9/L (ref 4.5–11.5)

## 2022-01-27 PROCEDURE — 6370000000 HC RX 637 (ALT 250 FOR IP): Performed by: FAMILY MEDICINE

## 2022-01-27 PROCEDURE — 3430000000 HC RX DIAGNOSTIC RADIOPHARMACEUTICAL: Performed by: RADIOLOGY

## 2022-01-27 PROCEDURE — 93016 CV STRESS TEST SUPVJ ONLY: CPT | Performed by: INTERNAL MEDICINE

## 2022-01-27 PROCEDURE — 6360000002 HC RX W HCPCS: Performed by: FAMILY MEDICINE

## 2022-01-27 PROCEDURE — 82962 GLUCOSE BLOOD TEST: CPT

## 2022-01-27 PROCEDURE — 82607 VITAMIN B-12: CPT

## 2022-01-27 PROCEDURE — 99233 SBSQ HOSP IP/OBS HIGH 50: CPT | Performed by: INTERNAL MEDICINE

## 2022-01-27 PROCEDURE — 83735 ASSAY OF MAGNESIUM: CPT

## 2022-01-27 PROCEDURE — 80048 BASIC METABOLIC PNL TOTAL CA: CPT

## 2022-01-27 PROCEDURE — 78452 HT MUSCLE IMAGE SPECT MULT: CPT | Performed by: INTERNAL MEDICINE

## 2022-01-27 PROCEDURE — 2580000003 HC RX 258: Performed by: FAMILY MEDICINE

## 2022-01-27 PROCEDURE — 82746 ASSAY OF FOLIC ACID SERUM: CPT

## 2022-01-27 PROCEDURE — 93018 CV STRESS TEST I&R ONLY: CPT | Performed by: INTERNAL MEDICINE

## 2022-01-27 PROCEDURE — 2700000000 HC OXYGEN THERAPY PER DAY

## 2022-01-27 PROCEDURE — 85025 COMPLETE CBC W/AUTO DIFF WBC: CPT

## 2022-01-27 PROCEDURE — 82728 ASSAY OF FERRITIN: CPT

## 2022-01-27 PROCEDURE — 94640 AIRWAY INHALATION TREATMENT: CPT

## 2022-01-27 PROCEDURE — 6360000002 HC RX W HCPCS: Performed by: INTERNAL MEDICINE

## 2022-01-27 PROCEDURE — 78452 HT MUSCLE IMAGE SPECT MULT: CPT

## 2022-01-27 PROCEDURE — A9500 TC99M SESTAMIBI: HCPCS | Performed by: RADIOLOGY

## 2022-01-27 PROCEDURE — 2060000000 HC ICU INTERMEDIATE R&B

## 2022-01-27 PROCEDURE — 36415 COLL VENOUS BLD VENIPUNCTURE: CPT

## 2022-01-27 PROCEDURE — 93017 CV STRESS TEST TRACING ONLY: CPT

## 2022-01-27 RX ADMIN — Medication 12 MILLICURIE: at 09:19

## 2022-01-27 RX ADMIN — IPRATROPIUM BROMIDE AND ALBUTEROL SULFATE 3 ML: .5; 2.5 SOLUTION RESPIRATORY (INHALATION) at 08:16

## 2022-01-27 RX ADMIN — APIXABAN 2.5 MG: 2.5 TABLET, FILM COATED ORAL at 20:58

## 2022-01-27 RX ADMIN — SODIUM CHLORIDE, PRESERVATIVE FREE 10 ML: 5 INJECTION INTRAVENOUS at 08:52

## 2022-01-27 RX ADMIN — ISOSORBIDE MONONITRATE 30 MG: 30 TABLET, EXTENDED RELEASE ORAL at 13:19

## 2022-01-27 RX ADMIN — SODIUM CHLORIDE, PRESERVATIVE FREE 10 ML: 5 INJECTION INTRAVENOUS at 20:58

## 2022-01-27 RX ADMIN — GLIPIZIDE 5 MG: 5 TABLET ORAL at 17:51

## 2022-01-27 RX ADMIN — IPRATROPIUM BROMIDE AND ALBUTEROL SULFATE 3 ML: .5; 2.5 SOLUTION RESPIRATORY (INHALATION) at 20:45

## 2022-01-27 RX ADMIN — AMLODIPINE BESYLATE 10 MG: 10 TABLET ORAL at 13:19

## 2022-01-27 RX ADMIN — PANTOPRAZOLE SODIUM 40 MG: 40 TABLET, DELAYED RELEASE ORAL at 05:26

## 2022-01-27 RX ADMIN — DOXAZOSIN 2 MG: 2 TABLET ORAL at 20:58

## 2022-01-27 RX ADMIN — APIXABAN 2.5 MG: 2.5 TABLET, FILM COATED ORAL at 13:19

## 2022-01-27 RX ADMIN — REGADENOSON 0.4 MG: 0.08 INJECTION, SOLUTION INTRAVENOUS at 10:52

## 2022-01-27 RX ADMIN — METOPROLOL SUCCINATE 50 MG: 50 TABLET, FILM COATED, EXTENDED RELEASE ORAL at 13:19

## 2022-01-27 RX ADMIN — FUROSEMIDE 40 MG: 10 INJECTION, SOLUTION INTRAMUSCULAR; INTRAVENOUS at 08:52

## 2022-01-27 RX ADMIN — FUROSEMIDE 40 MG: 10 INJECTION, SOLUTION INTRAMUSCULAR; INTRAVENOUS at 17:53

## 2022-01-27 RX ADMIN — VALSARTAN 40 MG: 80 TABLET, FILM COATED ORAL at 13:20

## 2022-01-27 RX ADMIN — VALSARTAN 40 MG: 80 TABLET, FILM COATED ORAL at 20:58

## 2022-01-27 ASSESSMENT — PAIN SCALES - GENERAL
PAINLEVEL_OUTOF10: 0

## 2022-01-27 NOTE — PROGRESS NOTES
Hospitalist Progress Note      SYNOPSIS: Patient admitted on 2022 for <principal problem not specified>  Mr. Rose Neely, a 80y.o. year old male  who  has a past medical history of CAD (coronary artery disease), CHF (congestive heart failure) ([facility] Chronic atrial fibrillation ([facility] [facility] Chronic kidney disease, stage III (moderate) ([facility] COPD (chronic obstructive pulmonary disease) ([facility] [facility] Diabetes mellitus ([facility] Essential hypertension, GERD (gastroesophageal reflux disease), H. pylori infection, Hx of degenerative disc disease, Hyperlipidemia, MI (myocardial infarction) ([facility] Osteoarthritis, PFO (patent foramen ovale), Popliteal cyst, and Pulmonary hypertension ([facility]    Patient presented to the emergency department with complaints of shortness of breath and swelling of the legs. Also notes weight gain. Denies fever, chills, nausea, vomiting, chest pain, abdominal pain. Shortness of breath is worse with exertion. Has been out of his Lasix for the last 2 weeks. Vital signs within normal limits and stable. Currently 98% on 2 L nasal cannula. Laboratory studies reveal BUN 38, creatinine 2.1, proBNP 6407, troponin of 50, hemoglobin 10.0. Chest x-ray shows stable cardiomegaly. Bibasilar crackles on exam.  Patient given Lasix. Medicine consulted for admission.          SUBJECTIVE:   Patient seen and examined at bedside. H e is breathing better. Cardiologist ordered stress test for today  Records reviewed. Stable overnight. No other overnight issues reported.    Temp (24hrs), Av.4 °F (36.9 °C), Min:98.1 °F (36.7 °C), Max:98.8 °F (37.1 °C)    DIET: Diet NPO  CODE: Full Code    Intake/Output Summary (Last 24 hours) at 2022 0913  Last data filed at 2022 0532  Gross per 24 hour   Intake 150 ml   Output 2 ml   Net 148 ml       OBJECTIVE:    /78   Pulse 84   Temp 98.8 °F (37.1 °C) (Oral)   Resp 16   Ht 5' 9\" (1.753 m)   Wt 245 lb (111.1 kg)   SpO2 98%   BMI 36.18 kg/m² General appearance: No apparent distress, appears stated age and cooperative. HEENT:  Conjunctivae/corneas clear. Neck: Supple. No jugular venous distention. Respiratory: Clear to auscultation bilaterally, normal respiratory effort  Cardiovascular: Regular rate rhythm, normal S1-S2. Bilateral lower extremity edema  Abdomen: Soft, nontender, nondistended  Musculoskeletal: No clubbing, cyanosis, no bilateral lower extremity edema. Brisk capillary refill.    Skin:  No rashes  on visible skin  Neurologic: awake, alert and following commands     Assessment and plan:    #Acute on chronic diastolic CHF:  -Resume IV diuresis with Lasix 40 mg twice daily  -Resume beta-blockers and valsartan  -2D echo ordered  -Cardiology input appreciated     #CAD-CABG at [facility] 2007/Elevated troponin:  -Nuclear stress test ordered by cardiology  -Resume beta-blocker Imdur     #Valvular heart disease    -Hx of MVP with moderate MR and moderate PH.   -Echo pending     #Chronic Afib:   -Rate controlled resume metoprolol and Eliquis    #Mild acute kidney injury  -Cardiorenal syndrome  -Improved with diuresis    #CKD stage III  -Avoid nephrotoxic agent  -Consult nephrology while diuresing the patient      #Hypertension  -Controlled     #Dyslipidemia     #Type 2 diabetes mellitus  -On oral hypoglycemic and insulin  -A1c 6.1     #COPD without exacerbation     #Anemia of chronic kidney disease    #History of GI bleed  -Monitor hemoglobin    #DVT prophylaxis patient already on Eliquis      DISPOSITION: To be determined     Medications:  REVIEWED DAILY    Infusion Medications    sodium chloride       Scheduled Medications    amLODIPine  10 mg Oral Daily    apixaban  2.5 mg Oral BID    glipiZIDE  5 mg Oral BID WC    ipratropium-albuterol  1 vial Nebulization TID    isosorbide mononitrate  30 mg Oral Daily    metoprolol succinate  50 mg Oral Daily    insulin glargine-yfgn  60 Units SubCUTAneous Nightly    pantoprazole  40 mg Oral QAM AC    doxazosin  2 mg Oral Daily    valsartan  40 mg Oral BID    sodium chloride flush  5-40 mL IntraVENous 2 times per day    furosemide  40 mg IntraVENous BID     PRN Meds: sodium chloride flush, sodium chloride, ondansetron **OR** ondansetron, polyethylene glycol, acetaminophen **OR** acetaminophen, perflutren lipid microspheres, regadenoson, albuterol    Labs:     Recent Labs     01/25/22  1823 01/26/22  0707 01/27/22 0432   WBC 8.1 7.9 9.9   HGB 10.0* 10.2* 9.9*   HCT 34.1* 34.2* 33.5*    347 364       Recent Labs     01/25/22  1823 01/26/22  0225 01/27/22 0432    141 143   K 4.7 4.8 4.4    105 105   CO2 27 26 26   BUN 38* 36* 37*   CREATININE 2.1* 2.0* 1.8*   CALCIUM 8.7 8.7 9.0       Recent Labs     01/25/22 1823   PROT 7.6   ALKPHOS 152*   ALT 9   AST 12   BILITOT 0.7       Recent Labs     01/25/22 1823   INR 1.5       No results for input(s): Alona Earnest in the last 72 hours. Chronic labs:    Lab Results   Component Value Date    CHOL 111 01/26/2022    TRIG 51 01/26/2022    HDL 36 01/26/2022    LDLCALC 65 01/26/2022    TSH 0.548 12/12/2015    INR 1.5 01/25/2022    LABA1C 6.1 (H) 01/26/2022       Radiology: REVIEWED DAILY    +++++++++++++++++++++++++++++++++++++++++++++++++  Zee Alexandre MD  [facility] Physician - [address] [place] [address]  +++++++++++++++++++++++++++++++++++++++++++++++++  NOTE: This report was transcribed using voice recognition software. Every effort was made to ensure accuracy; however, inadvertent computerized transcription errors may be present.

## 2022-01-27 NOTE — PROGRESS NOTES
[facility]  Nephrology Attending Progress Note  Jazmin Banegas MD        SUBJECTIVE:     1/26/22: The pt is an 79 yo male with a pmh of cad sp acb, dm, copd, htn, pfo,  hyperlipidemia, ckd , afib, p htn who presented with sob and swelling and weight gain. He ran out of his lasix 2 months ago. Labs show cr of 2.1, k 4.7, co2 27, bun 38, cr 2.1, ca 8.7, wbc 7.9, hgb 10.2, plt 347. Baseline cr is 1.8 from 5/2017. He has been started on iv lasix q 12. He is on diovan as an outpt.  [facility] a [facility]ist.     1/27: pt seen in room, no cp or sob      PROBLEM LIST:    Patient Active Problem List   Diagnosis    Diabetes mellitus ([address]    CAD (coronary artery disease)    COPD exacerbation ([address]    Morbid obesity due to excess calories ([address]    CHF exacerbation ([address]    Essential hypertension    Pure hypercholesterolemia    Acute on chronic diastolic congestive heart failure (HCC)    Chronic a-fib (HCC)    Chronic kidney disease, stage III (moderate) ([address]    Heart failure ([address]    Leg swelling        PAST MEDICAL HISTORY:    Past Medical History:   Diagnosis Date    CAD (coronary artery disease)     CHF (congestive heart failure) ([address]     Chronic atrial fibrillation ([address]     on Eliquis    Chronic kidney disease, stage III (moderate) ([address] 5/12/2017    COPD (chronic obstructive pulmonary disease) ([address]     Diabetes mellitus ([address]     Essential hypertension 12/12/2015    GERD (gastroesophageal reflux disease)     H. pylori infection     history of    Hx of degenerative disc disease     Hyperlipidemia     MI (myocardial infarction) ([address]     x2-- age 48 and then again in 2007    Osteoarthritis     PFO (patent foramen ovale)     Popliteal cyst     Pulmonary hypertension (HCC)        DIET:    Diet NPO     PHYSICAL EXAM:     Patient Vitals for the past 24 hrs:   BP Temp Temp src Pulse Resp SpO2   01/27/22 0745 132/78 98.8 °F (37.1 °C) Oral 84 16 98 %   01/27/22 0345 134/66 98.2 °F (36.8 °C) Oral 68 16 98 %   01/26/22 2004 132/68 98.1 °F (36.7 °C) Oral 72 18 98 %   01/26/22 1840 (!) 148/86 98.3 °F (36.8 °C) Oral 80 16 99 %   @      Intake/Output Summary (Last 24 hours) at 1/27/2022 1036  Last data filed at 1/27/2022 1030  Gross per 24 hour   Intake 140 ml   Output 552 ml   Net -412 ml         Wt Readings from Last 3 Encounters:   01/25/22 245 lb (111.1 kg)   02/11/20 223 lb (101.2 kg)   03/24/19 216 lb (98 kg)       Constitutional:  Pt is in no acute distress  Head: normocephalic, atraumatic  Neck: no JVD  Cardiovascular: regular rate and rhythm, no murmurs, gallops, or rubs  Respiratory:  No rales, rhochi, or wheezes  Gastrointestinal:  Soft, nontender, nondistended, bowel sounds x 4  Ext: le edema  Skin: dry, no rash  Neuro: aaox3    MEDS (scheduled):    amLODIPine  10 mg Oral Daily    apixaban  2.5 mg Oral BID    glipiZIDE  5 mg Oral BID WC    ipratropium-albuterol  1 vial Nebulization TID    isosorbide mononitrate  30 mg Oral Daily    metoprolol succinate  50 mg Oral Daily    insulin glargine-yfgn  60 Units SubCUTAneous Nightly    pantoprazole  40 mg Oral QAM AC    doxazosin  2 mg Oral Daily    valsartan  40 mg Oral BID    sodium chloride flush  5-40 mL IntraVENous 2 times per day    furosemide  40 mg IntraVENous BID       MEDS (infusions):   sodium chloride         MEDS (prn):  technetium sestamibi, sodium chloride flush, sodium chloride, ondansetron **OR** ondansetron, polyethylene glycol, acetaminophen **OR** acetaminophen, perflutren lipid microspheres, regadenoson, albuterol    DATA:    Recent Labs     01/25/22  1823 01/26/22  0707 01/27/22  0432   WBC 8.1 7.9 9.9   HGB 10.0* 10.2* 9.9*   HCT 34.1* 34.2* 33.5*   .4* 102.1* 102.1*    347 364     Recent Labs     01/25/22  1823 01/26/22  0225 01/27/22  0432    141 143   K 4.7 4.8 4.4    105 105   CO2 27 26 26   BUN 38* 36* 37*   CREATININE 2.1* 2.0* 1.8*   LABGLOM 30 32 36   GLUCOSE 110* 102* 110*   CALCIUM 8.7 8.7 9.0   ALT 9  --   --    AST 12  --   --    BILITOT 0.7  --   --    ALKPHOS 152*  --   --    MG  --  2.3 2.3       Lab Results   Component Value Date    LABALBU 3.5 01/25/2022    LABALBU 4.1 05/11/2017     Lab Results   Component Value Date    TSH 0.548 12/12/2015       Iron Studies  Lab Results   Component Value Date    IRON 51 (L) 01/26/2022    TIBC 271 01/26/2022    FERRITIN 89 01/27/2022     Vitamin B-12   Date Value Ref Range Status   01/27/2022 349 211 - 946 pg/mL Final     Folate   Date Value Ref Range Status   01/27/2022 11.8 4.8 - 24.2 ng/mL Final       No results found for: VITD25  PTH   Date Value Ref Range Status   12/12/2015 100 (H) 15 - 65 pg/mL Final       No components found for: URIC    No results found for: VOL, APPEARANCE, COLORU, LABSPEC, LABPH, LEUKBLD, NITRU, GLUCOSEU, KETUA, UROBILINOGEN, KETUA, UROBILINOGEN, BILIRUBINUR, OCBU    No results found for: [place]      IMPRESSION/RECOMMENDATIONS:      1. arf on ckd 3b  Baseline cr 1.8 from 5/2017  Cr 2.1>2>1.8  In setting of decompensated heart failure  On lasix q 12  Ran out of home meds  Follow on the diovan  Cr is near baseline  Check pth p     2. Volume overload  H/o mvp mod mr and mod p htn  Continue iv lasix  Follow cr  Card seeing  Awaits echo  uo?     3. H/o cad sp acb  Awaits stress     4. htn  Follow on diovan, toprol     5.anemia  Dose monique if hgb <10  Check fe b12 fol        Robert Schuster. Kailee Hills MD                         Revision History                                    Robert Schuster.  Kailee Hills MD

## 2022-01-27 NOTE — PROCEDURES
Lexiscan Stress EKG Report:    Dx CP    Baseline EKG: nonspecific ST and T waves changes, atrial fibrillation. Stress EKG: No ST-T changes. Arrhythmias: None. Symptoms: None. Summary:  Unremarkable lexiscan stress EKG. See separate report for stress perfusion results. Luz Gonzalez D.O.   Cardiologist  Cardiology, [facility]

## 2022-01-27 NOTE — PROGRESS NOTES
CHIEF COMPLAINT: SOB/CHF    HISTORY OF PRESENT ILLNESS: Patient is a 80 y.o. male seen at the request of No primary care provider on file. and seen in prior inpatient consult by Dr. Claritza León 5/2017. Some SOB. No CP. PAST MEDICAL/SURGICAL HISTORY:  1. Coronary artery disease. a. Reported MI at age 48 (the patient denies stenting at that time) -- at [facility].  2.  Status post CABG x4 in 2007 at [facility] -- reports have been requested. 3.  Chronic diastolic CHF. a.  12/12/2015 echocardiogram revealed mild LVH, EF of 55%, with mild prolapse of the anterior mitral valve leaflet, moderate MR, mild TR, moderate pulmonary hypertension with an RVSP of 45 mmHg. 4.  Persistent AFib. a. The patient reported that he had an ablation approximately 4-5 years ago at [facility] in [facility]. The patient was previously on Coumadin therapy. However, he reported that it was discontinued approximately 2-3 years ago due to a \"severe GI bleed. \"  Since that time, the patient has refused anticoagulation. b.  [id number] score of at least 5 (vascular disease, diabetes, age, hypertension). 5.  Hypertension. 6.  Hyperlipidemia, on statin therapy. 7.  Diabetes mellitus. 8.  COPD with no home oxygen. 9.  Remote history of tobacco abuse. He quit about 12 years ago and is a 50-pack-year smoker. 10.  History of tonsillectomy. 11.  History of eyelid surgery.     Past Medical History:   Diagnosis Date    CAD (coronary artery disease)     CHF (congestive heart failure) (HCC)     Chronic atrial fibrillation (HCC)     on Eliquis    Chronic kidney disease, stage III (moderate) (HCC) 5/12/2017    COPD (ch[facility] obstructive [facility] disease) [facility]     Diabetes mellitus ([facility]     Essential hypertension 12/12/2015    GERD (gastroesophageal reflux disease)     H. pylori infection     history of    Hx of degenerative disc disease     Hyperlipidemia     MI (myocardial infarction) ([facility]     x2-- age 48 and then again in 2007    Osteoarthritis     PFO (patent foramen ovale)     Popliteal cyst     Pulmonary hypertension ([address]        Patient Active Problem List   Diagnosis    Diabetes mellitus ([address]    CAD (coronary artery disease)    COPD exacerbation ([address]    Morbid obesity due to excess calories ([address]    [facility]    Essential hypertension    Pure hypercholesterolemia    Acute on chronic diastolic congestive heart failure (HCC)    Chronic a-fib (HCC)    Chronic kidney disease, stage III (moderate) (HCC)    Heart failure (HCC)    Leg swelling       Allergies   Allergen Reactions    Iodine Hives       Current Facility-Administered Medications   Medication Dose Route Frequency Provider Last Rate Last Admin    technetium sestamibi (CARDIOLITE) injection 35 millicurie  35 millicurie IntraVENous ONCE PRN Colleen Slaughter II, MD        amLODIPine (NORVASC) tablet 10 mg  10 mg Oral Daily Amena Fines, DO        apixaban [facility]) tablet 2.5 mg  2.5 mg Oral BID Amena Fines, DO   2.5 mg at 01/26/22 2110    glipiZIDE (GLUCOTROL) tablet 5 mg  5 mg Oral BID WC Amena Fines, DO        ipratropium-albuterol (DUONEB) nebulizer solution 3 mL  1 vial Nebulization TID Amena Fines, DO   3 mL at 01/27/22 0816    isosorbide mononitrate (IMDUR) extended release tablet 30 mg  30 mg Oral Daily Amena Fines, DO        metoprolol succinate (TOPROL XL) extended release tablet 50 mg  50 mg Oral Daily Amena Fines, DO        insulin glargine-yfgn [facility]) injection vial 60 Units  60 Units SubCUTAneous Nightly Amena Fines, DO        pantoprazole (PROTONIX) tablet 40 mg  40 mg Oral QAM AC Amena Fines, DO   40 mg at 01/27/22 0526    doxazosin (CARDURA) tablet 2 mg  2 mg Oral Daily Amena Fines, DO   2 mg at 01/26/22 2110    valsartan (DIOVAN) tablet 40 mg  40 mg Oral BID Amena Fines, DO   40 mg at 01/26/22 2110    sodium chloride flush 0.9 % injection 5-40 mL  5-40 mL IntraVENous 2 times per day Maximo Mask, DO   10 mL at 01/27/22 0775    sodium chloride flush 0.9 % injection 5-40 mL  5-40 mL IntraVENous PRN Maximo Mask, DO        0.9 % sodium chloride infusion  25 mL IntraVENous PRN Maximo Mask, DO        ondansetron (ZOFRAN-ODT) disintegrating tablet 4 mg  4 mg Oral Q8H PRN Maximo Mask, DO        Or    ondansetron [facility]) injection 4 mg  4 mg IntraVENous Q6H PRN Maximo Mask, DO        polyethylene glycol (GLYCOLAX) packet 17 g  17 g Oral Daily PRN Maximo Mask, DO        acetaminophen (TYLENOL) tablet 650 mg  650 mg Oral Q6H PRN Maximo Mask, DO        Or    acetaminophen (TYLENOL) suppository 650 mg  650 mg Rectal Q6H PRN Maximo Mask, DO        perflutren lipid microspheres (DEFINITY) injection 1.65 mg  1.5 mL IntraVENous ONCE PRN Maximo Mask, DO        furosemide (LASIX) injection 40 mg  40 mg IntraVENous BID Maximo Mask, DO   40 mg at 01/27/22 0228    regadenoson (LEXISCAN) injection 0.4 mg  0.4 mg IntraVENous ONCE PRN Pipe Bloodgood, DO        albuterol (PROVENTIL) nebulizer solution 2.5 mg  2.5 mg Nebulization 4x Daily PRN Maximo Mask, DO           Social History     Socioeconomic History    Marital status:      Spouse name: Not on file    Number of children: Not on file    Years of education: Not on file    Highest education level: Not on file   Occupational History    Not on file   Tobacco Use    Smoking status: Former Smoker    Smokeless tobacco: Never Used    Tobacco comment: quit 17 years   Vaping Use    Vaping Use: Never used   Substance and Sexual Activity    Alcohol use:  Yes     Alcohol/week: 1.0 standard drink     Types: 1 Glasses of wine per week     Comment: rarely wine    Drug use: No    Sexual activity: Not on file   Other Topics Concern    Not on file   Social History Narrative    Not on file     Social Determinants of Health     Financial Resource Strain:     Difficulty of Paying Living Expenses: Not on file   Food Insecurity:     Worried About Running Out of Food in the Last Year: Not on file    Nelson of Food in the Last Year: Not on file   Transportation Needs:     Lack of Transportation (Medical): Not on file    Lack of Transportation (Non-Medical): Not on file   Physical Activity:     Days of Exercise per Week: Not on file    Minutes of Exercise per Session: Not on file   Stress:     Feeling of Stress : Not on file   Social Connections:     Frequency of Communication with Friends and Family: Not on file    Frequency of Social Gatherings with Friends and Family: Not on file    Attends Hindu Services: Not on file    Active Member of [address] [place] [address] [employer] or Organizations: Not on file    Attends Club or Organization Meetings: Not on file    Marital Status: Not on file   Intimate Partner Violence:     Fear of Current or Ex-Partner: Not on file    Emotionally Abused: Not on file    Physically Abused: Not on file    Sexually Abused: Not on file   Housing Stability:     Unable to Pay for Housing in the Last Year: Not on file    Number of [place] in the Last Year: Not on file    Unstable Housing in the Last Year: Not on file       History reviewed. No pertinent family history. Review of Systems:   Heart: as above   Lungs: as above   Eyes: denies changes in vision or discharge. Ears: denies changes in hearing or pain. Nose: denies epistaxis or masses   Throat: denies sore throat or trouble swallowing. Neuro: denies numbness, tingling, tremors. Skin: denies rashes or itching. : denies hematuria, dysuria   GI: denies vomiting, diarrhea   Psych: denies mood changed, anxiety, depression. all others negative. Physical Exam   /78   Pulse 84   Temp 98.8 °F (37.1 °C) (Oral)   Resp 16   Ht 5' 9\" (1.753 m)   Wt 245 lb (111.1 kg)   SpO2 98%   BMI 36.18 kg/m²   Constitutional: Oriented to person, place, and time. Well-developed and well-nourished. No distress.     Head: Normocephalic and atraumatic. Eyes: EOM are normal. Pupils are equal, round, and reactive to light. Neck: Normal range of motion. Neck supple. No hepatojugular reflux and no JVD present. Carotid bruit is not present. No tracheal deviation present. No thyromegaly present. Cardiovascular: Normal rate, regular rhythm, normal heart sounds and intact distal pulses. Exam reveals no gallop and no friction rub. No murmur heard. Pulmonary/Chest: Effort normal and breath sounds normal. No respiratory distress. No wheezes. No rales. No tenderness. Abdominal: Soft. Bowel sounds are normal. No distension and no mass. No tenderness. No rebound and no guarding. Musculoskeletal: Normal range of motion. No edema and no tenderness. Lymphadenopathy:   No cervical adenopathy. No groin adenopathy. Neurological: Alert and oriented to person, place, and time. Skin: Skin is warm and dry. No rash noted. Not diaphoretic. No erythema. Psychiatric: Normal mood and affect.  Behavior is normal.     CBC:   Lab Results   Component Value Date    WBC 9.9 01/27/2022    RBC 3.28 01/27/2022    HGB 9.9 01/27/2022    HCT 33.5 01/27/2022    .1 01/27/2022    MCH 30.2 01/27/2022    MCHC 29.6 01/27/2022    RDW 15.6 01/27/2022     01/27/2022    MPV 8.9 01/27/2022     BMP:   Lab Results   Component Value Date     01/27/2022    K 4.4 01/27/2022    K 4.7 01/25/2022     01/27/2022    CO2 26 01/27/2022    BUN 37 01/27/2022    LABALBU 3.5 01/25/2022    CREATININE 1.8 01/27/2022    CALCIUM 9.0 01/27/2022    GFRAA 44 01/27/2022    LABGLOM 36 01/27/2022     Magnesium:    Lab Results   Component Value Date    MG 2.3 01/27/2022     Cardiac Enzymes:   Lab Results   Component Value Date    CKTOTAL 165 05/11/2017    CKTOTAL 134 05/11/2017    CKMB 1.4 05/11/2017    CKMB 1.3 05/11/2017    TROPHS 52 (H) 01/26/2022    TROPHS 52 (H) 01/26/2022    TROPHS 50 (H) 01/25/2022      PT/INR:    Lab Results   Component Value Date    PROTIME 16.5 01/25/2022 INR 1.5 01/25/2022     TSH:    Lab Results   Component Value Date    TSH 0.548 12/12/2015     Rhythm Strip: atrial fibrillation. ASSESSMENT AND PLAN:  Patient Active Problem List   Diagnosis    Diabetes mellitus ([address]    CAD (coronary artery disease)    COPD exacerbation ([address]    Morbid obesity due to excess calories ([address]    [facility]    Essential hypertension    Pure hypercholesterolemia    Acute on chronic diastolic congestive heart failure (HCC)    Chronic a-fib (HCC)    Chronic kidney disease, stage III (moderate) (HCC)    Heart failure (HCC)    Leg swelling     1. Acute on chronic diastolic CHF:    Echo ordered. Diurese. BB/ARB/IV lasix. 2. CAD-CABG at [facility] 2007/Elevated troponin:    Medically manage. Pharm stress to risk stratify today. BB/imdur. Consider statin. No ASA due to eliquis. 3. VHD: Hx of MVP with moderate MR and moderate PH. Echo ordered. Observe. 4. Chronic Afib: Dose adjusted eliquis. BB.     5. CKD: Follow labs. 6. HTN: Observe. 7. Lipids: Statin. 8. DM    9. COPD    10. Anemia: Follow labs. 11. Hx of GIB    Hannah Joyce D.O.   Cardiologist  Cardiology, [facility]

## 2022-01-27 NOTE — CARE COORDINATION
Care Coordination   [facility] on [facility]ion and weight gain. The patine states he has been out of his Lasix for the past 2 weeks. He was started on iv lasix  40 mg iv bid, 2decho is ordered. Labs b/c 38/2.1. bnp is 6407, troponin is 50. Dr Joseph Ban on c/s and the patient went for a stress test and is showed a large defect in the inferior wall and a non reversible defect of the anterior part of the heart. He is on Diovan  40 mg po bid. I went in and spoke to the patient  And he lives with his wife in a trailer 3 steps to enter. Dme his wife has a nebulizer but no o2  At home and he is on 2 liters n/c on currently attempt to wean it off if possible. He said he is very weak getting up to the bathroom. Pt Ot has been ordered. He has never been to [facility] or [facility]. [facility] and [facility] for [facility] place. I will await pt ot evals and if 02 2l cannot been weaned we will need  An ambultory pulse ox test prior to discharge to see if he needs home o2. He has no Dme preference at this time. I will await pt ot evals. He wants to return home with his wife . [facility]  I will follow

## 2022-01-27 NOTE — PLAN OF CARE
Patient's chart updated to reflect:      . - HF care plan, HF education points and HF discharge instructions.  -Orders: 2 gram sodium diet, daily weights, I/O.  -PCP and cardiology follow up appointments to be scheduled within 7 days of hospital discharge. -CHF education session will be provided to the patient prior to hospital discharge.     Meeta Jaimes RN RN, BSN  Heart Failure Navigator

## 2022-01-28 LAB
ANION GAP SERPL CALCULATED.3IONS-SCNC: 12 MMOL/L (ref 7–16)
BASOPHILS ABSOLUTE: 0.02 E9/L (ref 0–0.2)
BASOPHILS RELATIVE PERCENT: 0.3 % (ref 0–2)
BUN BLDV-MCNC: 36 MG/DL (ref 6–23)
CALCIUM SERPL-MCNC: 8.9 MG/DL (ref 8.6–10.2)
CHLORIDE BLD-SCNC: 104 MMOL/L (ref 98–107)
CO2: 25 MMOL/L (ref 22–29)
CREAT SERPL-MCNC: 2 MG/DL (ref 0.7–1.2)
EKG ATRIAL RATE: 64 BPM
EKG Q-T INTERVAL: 348 MS
EKG QRS DURATION: 106 MS
EKG QTC CALCULATION (BAZETT): 411 MS
EKG R AXIS: 37 DEGREES
EKG T AXIS: -143 DEGREES
EKG VENTRICULAR RATE: 84 BPM
EOSINOPHILS ABSOLUTE: 0.23 E9/L (ref 0.05–0.5)
EOSINOPHILS RELATIVE PERCENT: 3.1 % (ref 0–6)
GFR AFRICAN AMERICAN: 39
GFR NON-AFRICAN AMERICAN: 32 ML/MIN/1.73
GLUCOSE BLD-MCNC: 70 MG/DL (ref 74–99)
HCT VFR BLD CALC: 31 % (ref 37–54)
HEMOGLOBIN: 9.3 G/DL (ref 12.5–16.5)
IMMATURE GRANULOCYTES #: 0.03 E9/L
IMMATURE GRANULOCYTES %: 0.4 % (ref 0–5)
LYMPHOCYTES ABSOLUTE: 0.76 E9/L (ref 1.5–4)
LYMPHOCYTES RELATIVE PERCENT: 10.3 % (ref 20–42)
MAGNESIUM: 2.2 MG/DL (ref 1.6–2.6)
MCH RBC QN AUTO: 30.2 PG (ref 26–35)
MCHC RBC AUTO-ENTMCNC: 30 % (ref 32–34.5)
MCV RBC AUTO: 100.6 FL (ref 80–99.9)
METER GLUCOSE: 151 MG/DL (ref 74–99)
METER GLUCOSE: 74 MG/DL (ref 74–99)
METER GLUCOSE: 75 MG/DL (ref 74–99)
METER GLUCOSE: 87 MG/DL (ref 74–99)
MONOCYTES ABSOLUTE: 1.05 E9/L (ref 0.1–0.95)
MONOCYTES RELATIVE PERCENT: 14.2 % (ref 2–12)
NEUTROPHILS ABSOLUTE: 5.32 E9/L (ref 1.8–7.3)
NEUTROPHILS RELATIVE PERCENT: 71.7 % (ref 43–80)
PARATHYROID HORMONE INTACT: 78 PG/ML (ref 15–65)
PDW BLD-RTO: 15.7 FL (ref 11.5–15)
PHOSPHORUS: 3.7 MG/DL (ref 2.5–4.5)
PLATELET # BLD: 314 E9/L (ref 130–450)
PMV BLD AUTO: 8.7 FL (ref 7–12)
POTASSIUM SERPL-SCNC: 3.8 MMOL/L (ref 3.5–5)
PRO-BNP: ABNORMAL PG/ML (ref 0–450)
RBC # BLD: 3.08 E12/L (ref 3.8–5.8)
SODIUM BLD-SCNC: 141 MMOL/L (ref 132–146)
TROPONIN, HIGH SENSITIVITY: 53 NG/L (ref 0–11)
WBC # BLD: 7.4 E9/L (ref 4.5–11.5)

## 2022-01-28 PROCEDURE — 93005 ELECTROCARDIOGRAM TRACING: CPT | Performed by: INTERNAL MEDICINE

## 2022-01-28 PROCEDURE — 97165 OT EVAL LOW COMPLEX 30 MIN: CPT

## 2022-01-28 PROCEDURE — 36415 COLL VENOUS BLD VENIPUNCTURE: CPT

## 2022-01-28 PROCEDURE — 6360000002 HC RX W HCPCS: Performed by: FAMILY MEDICINE

## 2022-01-28 PROCEDURE — 2500000003 HC RX 250 WO HCPCS: Performed by: INTERNAL MEDICINE

## 2022-01-28 PROCEDURE — 6370000000 HC RX 637 (ALT 250 FOR IP): Performed by: INTERNAL MEDICINE

## 2022-01-28 PROCEDURE — 2060000000 HC ICU INTERMEDIATE R&B

## 2022-01-28 PROCEDURE — 80048 BASIC METABOLIC PNL TOTAL CA: CPT

## 2022-01-28 PROCEDURE — 83970 ASSAY OF PARATHORMONE: CPT

## 2022-01-28 PROCEDURE — 84100 ASSAY OF PHOSPHORUS: CPT

## 2022-01-28 PROCEDURE — 97535 SELF CARE MNGMENT TRAINING: CPT

## 2022-01-28 PROCEDURE — 83735 ASSAY OF MAGNESIUM: CPT

## 2022-01-28 PROCEDURE — 6370000000 HC RX 637 (ALT 250 FOR IP): Performed by: FAMILY MEDICINE

## 2022-01-28 PROCEDURE — 99233 SBSQ HOSP IP/OBS HIGH 50: CPT | Performed by: INTERNAL MEDICINE

## 2022-01-28 PROCEDURE — 2580000003 HC RX 258: Performed by: INTERNAL MEDICINE

## 2022-01-28 PROCEDURE — 85025 COMPLETE CBC W/AUTO DIFF WBC: CPT

## 2022-01-28 PROCEDURE — 84484 ASSAY OF TROPONIN QUANT: CPT

## 2022-01-28 PROCEDURE — 93010 ELECTROCARDIOGRAM REPORT: CPT | Performed by: INTERNAL MEDICINE

## 2022-01-28 PROCEDURE — 94640 AIRWAY INHALATION TREATMENT: CPT

## 2022-01-28 PROCEDURE — 82962 GLUCOSE BLOOD TEST: CPT

## 2022-01-28 PROCEDURE — 83880 ASSAY OF NATRIURETIC PEPTIDE: CPT

## 2022-01-28 PROCEDURE — 6360000002 HC RX W HCPCS: Performed by: INTERNAL MEDICINE

## 2022-01-28 PROCEDURE — 97530 THERAPEUTIC ACTIVITIES: CPT

## 2022-01-28 PROCEDURE — 2580000003 HC RX 258: Performed by: FAMILY MEDICINE

## 2022-01-28 PROCEDURE — 97161 PT EVAL LOW COMPLEX 20 MIN: CPT

## 2022-01-28 PROCEDURE — 2700000000 HC OXYGEN THERAPY PER DAY

## 2022-01-28 RX ORDER — MAGNESIUM HYDROXIDE/ALUMINUM HYDROXICE/SIMETHICONE 120; 1200; 1200 MG/30ML; MG/30ML; MG/30ML
30 SUSPENSION ORAL EVERY 6 HOURS PRN
Status: DISCONTINUED | OUTPATIENT
Start: 2022-01-28 | End: 2022-02-07 | Stop reason: HOSPADM

## 2022-01-28 RX ORDER — INSULIN GLARGINE-YFGN 100 [IU]/ML
45 INJECTION, SOLUTION SUBCUTANEOUS NIGHTLY
Status: DISCONTINUED | OUTPATIENT
Start: 2022-01-28 | End: 2022-02-07 | Stop reason: HOSPADM

## 2022-01-28 RX ORDER — DEXTROSE MONOHYDRATE 50 MG/ML
100 INJECTION, SOLUTION INTRAVENOUS PRN
Status: DISCONTINUED | OUTPATIENT
Start: 2022-01-28 | End: 2022-02-07 | Stop reason: HOSPADM

## 2022-01-28 RX ORDER — BUMETANIDE 0.25 MG/ML
1 INJECTION, SOLUTION INTRAMUSCULAR; INTRAVENOUS 2 TIMES DAILY
Status: DISCONTINUED | OUTPATIENT
Start: 2022-01-28 | End: 2022-01-28

## 2022-01-28 RX ORDER — NICOTINE POLACRILEX 4 MG
15 LOZENGE BUCCAL PRN
Status: DISCONTINUED | OUTPATIENT
Start: 2022-01-28 | End: 2022-02-07 | Stop reason: HOSPADM

## 2022-01-28 RX ORDER — ATORVASTATIN CALCIUM 40 MG/1
40 TABLET, FILM COATED ORAL NIGHTLY
Status: DISCONTINUED | OUTPATIENT
Start: 2022-01-28 | End: 2022-01-28

## 2022-01-28 RX ORDER — DEXTROSE MONOHYDRATE 25 G/50ML
12.5 INJECTION, SOLUTION INTRAVENOUS PRN
Status: DISCONTINUED | OUTPATIENT
Start: 2022-01-28 | End: 2022-02-07 | Stop reason: HOSPADM

## 2022-01-28 RX ADMIN — APIXABAN 2.5 MG: 2.5 TABLET, FILM COATED ORAL at 08:58

## 2022-01-28 RX ADMIN — PANTOPRAZOLE SODIUM 40 MG: 40 TABLET, DELAYED RELEASE ORAL at 06:03

## 2022-01-28 RX ADMIN — BUMETANIDE 1 MG/HR: 0.25 INJECTION INTRAMUSCULAR; INTRAVENOUS at 15:09

## 2022-01-28 RX ADMIN — GLIPIZIDE 5 MG: 5 TABLET ORAL at 08:58

## 2022-01-28 RX ADMIN — BUMETANIDE 1 MG: 0.25 INJECTION INTRAMUSCULAR; INTRAVENOUS at 11:25

## 2022-01-28 RX ADMIN — APIXABAN 2.5 MG: 2.5 TABLET, FILM COATED ORAL at 21:28

## 2022-01-28 RX ADMIN — IPRATROPIUM BROMIDE AND ALBUTEROL SULFATE 3 ML: .5; 2.5 SOLUTION RESPIRATORY (INHALATION) at 08:05

## 2022-01-28 RX ADMIN — MAGNESIUM HYDROXIDE/ALUMINUM HYDROXICE/SIMETHICONE 30 ML: 120; 1200; 1200 SUSPENSION ORAL at 11:25

## 2022-01-28 RX ADMIN — FUROSEMIDE 40 MG: 10 INJECTION, SOLUTION INTRAMUSCULAR; INTRAVENOUS at 08:58

## 2022-01-28 RX ADMIN — SODIUM CHLORIDE, PRESERVATIVE FREE 10 ML: 5 INJECTION INTRAVENOUS at 08:59

## 2022-01-28 RX ADMIN — VALSARTAN 40 MG: 80 TABLET, FILM COATED ORAL at 08:58

## 2022-01-28 RX ADMIN — METOPROLOL SUCCINATE 50 MG: 50 TABLET, FILM COATED, EXTENDED RELEASE ORAL at 08:58

## 2022-01-28 RX ADMIN — AMLODIPINE BESYLATE 10 MG: 10 TABLET ORAL at 08:58

## 2022-01-28 RX ADMIN — ISOSORBIDE MONONITRATE 30 MG: 30 TABLET, EXTENDED RELEASE ORAL at 08:58

## 2022-01-28 RX ADMIN — DOXAZOSIN 2 MG: 2 TABLET ORAL at 21:28

## 2022-01-28 RX ADMIN — VALSARTAN 40 MG: 80 TABLET, FILM COATED ORAL at 21:28

## 2022-01-28 RX ADMIN — EPOETIN ALFA-EPBX 3000 UNITS: 3000 INJECTION, SOLUTION INTRAVENOUS; SUBCUTANEOUS at 08:59

## 2022-01-28 ASSESSMENT — PAIN SCALES - GENERAL
PAINLEVEL_OUTOF10: 0
PAINLEVEL_OUTOF10: 0

## 2022-01-28 NOTE — CARE COORDINATION
1/28/2022 - received call from Rolando at [facility] requesting clinicals. Faxed clinicals to [phone].

## 2022-01-28 NOTE — FLOWSHEET NOTE
Inpatient Wound Care (initial consult) 4505B    Admit Date: 1/25/2022  6:02 PM    Reason for consult:  Bilateral lower legs    Significant history: Per H&P     Chief Complaint:  had concerns including Leg Swelling (sent in from [facility] for increased swelling in legs, thighs, & abdomen & SOB w exertion) and Shortness of Breath.       Findings:     01/28/22 1420   Skin Integrity   Skin Integrity   (vascular discoloration, dry, flaky, swelling)   Location BLE   Skin Integrity Site 2   Skin Integrity Location 2 Blister   Location 2   (left medial lower leg)   Skin Integrity Site 3   Skin Integrity Location 3   (erosion)    Location 3 bilateral buttocks   Wound 01/28/22 Leg Right; Lower LDA for dressing change only, no wound   Date First Assessed/Time First Assessed: 01/28/22 1419   Present on Hospital Admission: Yes  Location: Leg  Wound Location Orientation: Right; Lower  Wound Description (Comments): LDA for dressing change only, no wound   Dressing/Treatment ABD;Roll gauze  (tubi-)   Wound 01/28/22 Leg Lower; Left LDA for dressing change only, no wound   Date First Assessed/Time First Assessed: 01/28/22 1420   Present on Hospital Admission: Yes  Location: Leg  Wound Location Orientation: Lower; Left  Wound Description (Comments): LDA for dressing change only, no wound   Dressing/Treatment ABD;Roll gauze  (Tubi-)       **Informed Consent**    The patient has given verbal consent to have photos taken of Bilateral lower legs and inserted into their chart as part of their permanent medical record for purposes of documentation, treatment management and/or medical review. All Images taken on 1/28/22 of patient name: Alejandro Nicholas were transmitted and stored on [employer] located within [facility] by a registered [employer] Mobile Application Device.      Impression:  Bilateral lower legs: see media    Plan: ABD pad, kerlix, tubi-  Bilateral lower legs measured: size F tubi- applied  Marek Criptext cream  TAPS  Seat cushion  Patient will need continued preventative care      Benny Starr RN 1/28/2022 2:23 PM

## 2022-01-28 NOTE — PROGRESS NOTES
OCCUPATIONAL THERAPY INITIAL EVALUATION    [address] [address] [address]      Date:2022                                                  Patient Name: Heidi Delacruz  MRN: [id number]  : 1940  Room: [address] [place] [address]    Evaluating OT: ROXANNA Estrada, OTR/L  # [id number]    Referring Provider:  Carlos Manuel Crenshaw MD  Specific Provider Orders:  Casey Foley and Treat\"  22    Diagnosis: Heart failure ([address] [I50.9]  Leg swelling [M79.89]  Dyspnea, unspecified type [R06.00]  Acute congestive heart failure, unspecified heart failure type ([address] [I50.9]    Pt was admitted w/ SOB, LE swelling    Pertinent Medical History:  Pt has a past medical history of CAD (coronary artery disease), CHF (congestive heart failure) ([address] Chronic atrial fibrillation ([address] Chronic kidney disease, stage III (moderate) ([address] COPD (chronic obstructive pulmonary disease) ([address] Diabetes mellitus ([address] Essential hypertension, GERD (gastroesophageal reflux disease), H. pylori infection, Hx of degenerative disc disease, Hyperlipidemia, MI (myocardial infarction) ([address] Osteoarthritis, PFO (patent foramen ovale), Popliteal cyst, and Pulmonary hypertension ([address] ,  has a past surgical history that includes Mitral valve replacement and Coronary artery bypass graft.     Surgeries this admission: None     Precautions:  Fall Risk  2L O2    Assessment of current deficits   [x] Functional mobility   [x]ADLs  [x] Strength               []Cognition   [x] Functional transfers   [x] IADLs         [x] Safety Awareness   [x]Endurance   [] Fine Coordination              [x] Balance      [] Vision/perception   []Sensation    []Gross Motor Coordination  [] ROM  [] Delirium                   [] Motor Control       OT PLAN OF CARE   OT POC based on physician orders, patient diagnosis and results of clinical assessment    Frequency/Duration 1-3 days/wk for 2 weeks PRN   Specific OT Treatment to include:   * Instruction/training on adapted ADL techniques and AE recommendations to increase functional independence within precautions       * Training on energy conservation strategies, correct breathing pattern and techniques to improve independence/tolerance for self-care routine  * Functional transfer/mobility training/DME recommendations for increased independence, safety, and fall prevention  * Patient/Family education to increase follow through with safety techniques and functional independence  * Recommendation of environmental modifications for increased safety with functional transfers/mobility and ADLs  * Therapeutic exercise to improve motor endurance, ROM, and functional strength for ADLs/functional transfers  * Therapeutic activities to facilitate/challenge dynamic balance, stand tolerance for increased safety and independence with ADLs  * Therapeutic activities to facilitate gross/fine motor skills for increased independence with ADLs  * Neuro-muscular re-education: facilitation of righting/equilibrium reactions, midline orientation, scapular stability/mobility, normalization of muscle tone, and facilitation of volitional active controled movement  * Positioning to improve skin integrity, interaction with environment and functional independence  * Manual techniques for edema management  Other:    Recommended Adaptive Equipment: TBD as pt progresses - Consider Raised Commode seat, adaptive equipment      Home Living:  Pt lives with his wife in a 1-story house, No Basement. Bathroom setup:  Tub-Shower, Standard-height Commode   Equipment owned:  Shower chair    Available Family Assist:  Wife can provide some assist - unable to provide physical assist    Prior Level of Function:  Pt was IND with ADLs, IADLs, Transfers and Mobility using No AD for ambulation. Assist w/ Mickey LE wound care, donning foot-wear.     Driving:  Yes - shopping, outings, errands  Occupation:  None reported,     Pain Level:  Denied pain    Additional Complaints:  None    Cognition: A & O x 4   Able to Follow Multi-Step Commands w/ Occasional Min VCs   Memory:  good (-)   Sequencing:  good (-)   Problem solving:  good (-)   Judgement/safety:  good (-)  Additional Comments:  Pt was pleasant and cooperative.       Vitals/Lab Values:   O2 sats seated in chair w/ 3L = 100%  O2 sats seated in chair w/ 2L = 98%    O2 sats seated in chair on Room Air = 96%  O2 sats w/ standing ax on Room Air = 91%  O2 sats w/ functional mobility on Room Air = 86%    O2 sats in semi-supine at end of session on 2L = 94%  RN Made aware         Functional Assessment:  AM-PAC Daily Activity Raw Score: 15/24     Initial Eval Status  Date: 1/28/22   Treatment Status  Date: STGs = LTGs  Time frame: 10-14 days   Feeding IND after set up    Seated in chair    NA   Grooming SUP/Set up    Able to tolerate standing at the sink ~ 5 mins to complete tasks  VCs for improved safety awareness    Mod I  Standing at the [employer] A/Set up    [facility] after set up seated EIB  Unable to tolerate item retrieval for activities    Mod I     LB Dressing Mod A    Max A to don socks over wounds/dressing Mickey LEs  Min A for pants - simulated in sitting/standing  Pt ed for safe/adaptive techs, use of adaptive equip    Min A     Bathing NT    Pt ed re: Benefits of use of Shower chair for safety w/ bathing, tub transfer    SUP for safety      Toileting NT    Declined  Mod I     Bed Mobility  Supine to sit: NT   Sit to supine:  SUP    VCs for safety    Supine to sit: IND  Sit to supine: IND     Functional Transfers Min A    EOB, Chair 2x  Pt ed for safety/hand placement    Mod I     Functional Mobility Min A w/ [facility]    Bed<>Bathroom + Short household distance in room on room air - see vitals above  Pt ed for safety/improved safety awareness, walker safety    Mod I     Balance Sitting:     Static:  IND in chair, Remote SUP EOB    Dynamic:  SUP w/ functional ax unsupported at EOB     Standing:     Static:  Close SUP w/ [employer]    Dynamic:  CGA w/ functional ax/mobility w/ [employer]    Sitting:     Static:  IND    Dynamic:  IND w/ functional ax    Standing:     Static:  Mod I w/ AD PRN    Dynamic:  Mod I w/ functional ax/mobility w/ AD PRN   Activity Tolerance Fair    Good(-)   Visual/  Perceptual    Hearing: WNL   Glasses: Yes    WFL   Hearing Aids:  No               Hand Dominance: Right   AROM Strength Additional Info:    RUE  WFL WNL Good ;   Good FMC/dexterity noted during ADL tasks     LUE WFL WNL Good ;    Good FMC/dexterity noted during ADL tasks       Sensation:  Denies numbness or tingling Mickey UEs   Tone: WFL Mickey UEs   Edema: None Noted Mickey UEs;  Chronic moderately severe edema Mickey LEs    Comments: Upon arrival, patient was found seated in chair. She was agreeable to participate in therapeutic ax. No Family present during session. Received permission from RN prior to engaging pt in OT services. Educated pt on role of OT services. At the end of the session, patient was properly positioned in Semi-Supine per RN Request for testing. Call light and phone within reach, all lines and tubes intact. Oriented pt to call bell. Made all appropriate Environmental Modifications to facilitate pt's level of IND and safety. All needs met. RN at b/s         Overall patient demonstrated decreased independence and safety during completion of ADL/functional transfer/mobility tasks. Pt would benefit from continued skilled OT to increase safety and independence with completion of ADL/IADL tasks for functional independence and quality of life.     Treatment: OT treatment provided this date includes:    Instruction/training on safety and adapted techniques for completion of ADLs, use of DME/AD/Adaptive equip:     Instruction/training on safe functional mobility/transfer techniques, use of DME/AD:     Instruction/training on energy conservation techs (EC)/Pursed-Lip Breathing (PLB)/work simplification for completion of ADLs:      Neuromuscular Reeducation to facilitate balance/righting reactions for increased function with ADLs:     Skilled positioning/alignment for Skin Integrity, Edema Control, to maximize Pt's safety and ability to [facility] interact w/ his/her environment, maximize respiratory status   Activity tolerance - Sitting/Standing to improve endurance w/ functional ax    Cognitive retraining -  Oriented pt to current Date, Place and Situation; Cues for safety/safety awareness, sequencing, problem solving     Skilled monitoring of Vitals during session and pt's response to tx ax      Consulted RN     Made all appropriate Environmental Modifications to facilitate pt's level of IND and safety.  Recommendations for Continued Participation in OT services during Hospitalization and at D/C     Pt and/or Family verbalized/demonstrated a Good(-) understanding of education provided. Will Review PRN. Rehab Potential: Good(-) for established goals     Patient / Family Goal: Not stated at this time      Patient and/or family were instructed on functional diagnosis, prognosis/goals and OT plan of care. Demonstrated Good(-) understanding. Eval Complexity: Low    Time In: 1049  Time Out: 1118  Total Treatment Time: 14 minutes    Min Units   OT Eval Low 97165  X  1   OT Eval Medium 64849      OT Eval High 90941      OT Re-Eval [id number]       Therapeutic Ex 84236       Therapeutic Activities 00307       ADL/Self Care 59287  14  1   Orthotic Management 66159       Manual [id number]     Neuro Re-Ed 89945       Non-Billable Time              Evaluation Time additionally includes thorough review of current medical information, gathering information on past medical history/social history and prior level of function, completion of standardized testing/informal observation of tasks, assessment of data and education on plan of care and goals.             Katlyn Hoover, MOT, OTR/L  # [id number]

## 2022-01-28 NOTE — PROGRESS NOTES
[facility]  Nephrology Attending Progress Note          SUBJECTIVE:     1/26/22: The pt is an 81 yo male with a pmh of cad sp acb, dm, copd, htn, pfo,  hyperlipidemia, ckd , afib, p htn who presented with sob and swelling and weight gain. He ran out of his lasix 2 months ago. Labs show cr of 2.1, k 4.7, co2 27, bun 38, cr 2.1, ca 8.7, wbc 7.9, hgb 10.2, plt 347. Baseline cr is 1.8 from 5/2017. He has been started on iv lasix q 12. He is on diovan as an outpt. [facility] and [facility]ist.     1/27: pt seen in room, no cp or sob    1/28: Denies shortness of breath; leg edema essentially unchanged, severe      PROBLEM LIST:    Patient Active Problem List   Diagnosis    Diabetes mellitus ([address]    CAD (coronary artery disease)    COPD exacerbation ([address]    Morbid obesity due to excess calories ([address]    CHF exacerbation ([address]    Essential hypertension    Pure hypercholesterolemia    Acute on chronic diastolic congestive heart failure (HCC)    Chronic a-fib ([address]    Chronic kidney disease, stage III (moderate) ([address]    Heart failure ([address]    Leg swelling        PAST MEDICAL HISTORY:    Past Medical History:   Diagnosis Date    CAD (coronary artery disease)     CHF (congestive heart failure) ([address]     Chronic atrial fibrillation ([address]     on Eliquis    Chronic kidney disease, stage III (moderate) ([address] 5/12/2017    COPD (chronic obstructive pulmonary disease) ([address]     Diabetes mellitus ([address]     Essential hypertension 12/12/2015    GERD (gastroesophageal reflux disease)     H. pylori infection     history of    Hx of degenerative disc disease     Hyperlipidemia     MI (myocardial infarction) ([address]     x2-- age 48 and then again in 2007    Osteoarthritis     PFO (patent foramen ovale)     Popliteal cyst     Pulmonary hypertension ([address]        DIET:    ADULT DIET; Regular;  Low Sodium (2 gm)     PHYSICAL EXAM:     Patient Vitals for the past 24 hrs:   BP Temp Temp src Pulse Resp SpO2   01/28/22 0856 124/71 -- -- -- -- --   01/28/22 0741 122/80 97.9 °F (36.6 °C) Oral 88 18 94 %   01/27/22 2002 114/71 98.5 °F (36.9 °C) Oral 70 18 96 %   01/27/22 1550 120/64 98.5 °F (36.9 °C) Oral 80 18 95 %   01/27/22 1300 (!) 140/80 98.7 °F (37.1 °C) Oral 96 18 96 %   @    No intake or output data in the 24 hours ending 01/28/22 1222      Wt Readings from Last 3 Encounters:   01/25/22 245 lb (111.1 kg)   02/11/20 223 lb (101.2 kg)   03/24/19 216 lb (98 kg)       Constitutional:  Pt is in no acute distress  Head: normocephalic, atraumatic  Neck: no JVD  Cardiovascular: regular rate and rhythm, no murmurs, gallops, or rubs  Respiratory:  No rales, rhochi, or wheezes  Gastrointestinal:  Soft, nontender, nondistended, bowel sounds x 4  Ext: Severe edema 3+ to both thighs  Skin: dry, no rash  Neuro: aaox3    MEDS (scheduled):    insulin lispro  0-6 Units SubCUTAneous TID WC    insulin lispro  0-3 Units SubCUTAneous Nightly    insulin glargine-yfgn  45 Units SubCUTAneous Nightly    atorvastatin  40 mg Oral Nightly    bumetanide  1 mg IntraVENous BID    epoetin roly-epbx  3,000 Units SubCUTAneous Once per day on Mon Wed Fri    apixaban  2.5 mg Oral BID    ipratropium-albuterol  1 vial Nebulization TID    isosorbide mononitrate  30 mg Oral Daily    metoprolol succinate  50 mg Oral Daily    pantoprazole  40 mg Oral QAM AC    doxazosin  2 mg Oral Daily    valsartan  40 mg Oral BID    sodium chloride flush  5-40 mL IntraVENous 2 times per day       MEDS (infusions):   dextrose      sodium chloride         MEDS (prn):  glucose, dextrose, glucagon (rDNA), dextrose, aluminum & magnesium hydroxide-simethicone, technetium sestamibi, sodium chloride flush, sodium chloride, ondansetron **OR** ondansetron, polyethylene glycol, acetaminophen **OR** acetaminophen, perflutren lipid microspheres, albuterol    DATA:    Recent Labs     01/26/22  0707 01/27/22  4940 01/28/22 0429   WBC 7.9 9.9 7.4   HGB 10.2* 9.9* 9.3*   HCT 34.2* 33.5* 31.0*   .1* 102.1* 100.6*    364 314     Recent Labs     01/25/22  1823 01/25/22  1823 01/26/22  0225 01/27/22  0432 01/28/22 0429      < > 141 143 141   K 4.7  --  4.8 4.4 3.8      < > 105 105 104   CO2 27   < > 26 26 25   BUN 38*   < > 36* 37* 36*   CREATININE 2.1*   < > 2.0* 1.8* 2.0*   LABGLOM 30   < > 32 36 32   GLUCOSE 110*   < > 102* 110* 70*   CALCIUM 8.7   < > 8.7 9.0 8.9   ALT 9  --   --   --   --    AST 12  --   --   --   --    BILITOT 0.7  --   --   --   --    ALKPHOS 152*  --   --   --   --    MG  --   --  2.3 2.3 2.2   PHOS  --   --   --   --  3.7    < > = values in this interval not displayed. Lab Results   Component Value Date    LABALBU 3.5 01/25/2022    LABALBU 4.1 05/11/2017     Lab Results   Component Value Date    TSH 0.548 12/12/2015       Iron Studies  Lab Results   Component Value Date    IRON 51 (L) 01/26/2022    TIBC 271 01/26/2022    FERRITIN 89 01/27/2022     Vitamin B-12   Date Value Ref Range Status   01/27/2022 349 211 - 946 pg/mL Final     Folate   Date Value Ref Range Status   01/27/2022 11.8 4.8 - 24.2 ng/mL Final       No results found for: VITD25  PTH   Date Value Ref Range Status   01/28/2022 78 (H) 15 - 65 pg/mL Final       No components found for: URIC    No results found for: VOL, APPEARANCE, COLORU, LABSPEC, LABPH, LEUKBLD, NITRU, GLUCOSEU, KETUA, UROBILINOGEN, KETUA, UROBILINOGEN, BILIRUBINUR, OCBU    No results found for: LIPIDPAN      IMPRESSION/RECOMMENDATIONS:      1. arf on ckd 3b  Baseline cr 1.8 from 5/2017  Cr 2.1>2>1.8  In setting of decompensated heart failure  On lasix q 12  Ran out of home meds  Follow on the diovan  Cr is near baseline  Check pth p     2.   Acute decompensated diastolic heart failure  H/o mvp mod mr and mod p htn  But response to intermittent diuretic dosing  Will start Bumex  Card seeing  Awaits echo  Reluctant to have a Martinez catheter at this     3. H/o cad sp acb  Awaits stress     4. htn  Follow on diovan, toprol     5.anemia  Dose monique if hgb <10  Check fe b12 fol        Nataliia Perry MD

## 2022-01-28 NOTE — CARE COORDINATION
Spoke with patient, we discussed planning after he participated with therapy. He does feel he will be able to return home with his spouse when released. [facility]. He does not have a walker at home. I discussed with him follow up with the [address] [place] [address] physician to obtain a walker. He has medicare part A only which will not cover dme. I also discussed private pay to obtain walker but he would prefer to follow up with the [address] [place] [address] for this. He plans to return home when released. For questions I can be reached at [phone].  Karon Perry, [place]

## 2022-01-28 NOTE — CONSULTS
Patient currently admitted with diagnosis of Diastolic heart failure. Patient was laying in bed during the consultation. He was engaged and asked appropriate questions throughout the education session. He is agreeable to scheduling with his PCP (2/1/22 @ 11:30 Dr. Zayra Na[facility]), Cardiology APRN, and CHF clinic. Future Appointments   Date Time Provider Mila Xavieri   2/11/2022  1:30 PM NORY [facility] ROOM 1 [facility]   2/16/2022 10:30 AM AISHA Contreras - CNP [facility] CARDIO [facility]            We reviewed the introduction to Heart Failure, the HF zones, signs and symptoms to report on day 1 of onset, medications, medication compliance, the importance of obtaining daily weights, following a low sodium diet, reading food labels for the sodium content, keeping physician appointments, and smoking cessation. We discussed writing / tracking daily weights on a calendar / log because a 5 pound gain in 1 week can sneak up if you are not tracking it. You can also take your charted weights to your doctor appointments to be reviewed. Contributing risk factors for Heart Failure are identified as overwhelmed with chronic disease management. I advised patient they can reduce the risk for Heart Failure exacerbations by modifying / controlling the risk factors. We discussed self-managed care which includes the following:  to take medications as prescribed, report any intolerable side effects of medications to the cardiologist / doctor, do not just stop taking the medication; follow a cardiac heart healthy / low sodium diet; weigh yourself daily, exercise regularly- per doctor recommendation and not to smoke or use an excess amount of alcohol. We discussed calling the cardiologist / doctor with a weight gain of 3 pounds in one day or a total of 5 pounds or more in one week.  Also, if you should have a significant weight loss of 3# or more in one day to call the doctor, they may need to decrease or hold the diuretic dose. On days you feels nauseated and not eating / drinking, having emesis or diarrhea,  informed to call the cardiologist  / doctor, they may need to decrease or hold diuretic to avoid dehydration. I stressed the importance of informing their cardiologist the first day of onset of any of the signs and symptoms in the \"Yellow Zone\" of the HF Zones. Patient verbalizes understanding. Greater than 30 minutes was spent educating patient. BNP:   Lab Results   Component Value Date    PROBNP 11,287 (H) 01/28/2022       History of:    has a past medical history of CAD (coronary artery disease), CHF (congestive heart failure) ([facility]), Chronic atrial fibrillation ([facility]), Chronic kidney disease, stage III (moderate) ([facility]), COPD (chronic obstructive pulmonary disease) ([facility] Diabetes mellitus ([address] Essential hypertension, GERD (gastroesophageal reflux disease), H. pylori infection, Hx of degenerative disc disease, Hyperlipidemia, MI (myocardial infarction) ([address] Osteoarthritis, PFO (patent foramen ovale), Popliteal cyst, and Pulmonary hypertension ([facility] has a past surgical history that includes Mitral valve replacement and Coronary artery bypass graft. Medications:    insulin lispro  0-6 Units SubCUTAneous TID WC    insulin lispro  0-3 Units SubCUTAneous Nightly    insulin glargine-yfgn  45 Units SubCUTAneous Nightly    epoetin roly-epbx  3,000 Units SubCUTAneous Once per day on Mon Wed Fri    apixaban  2.5 mg Oral BID    ipratropium-albuterol  1 vial Nebulization TID    isosorbide mononitrate  30 mg Oral Daily    metoprolol succinate  50 mg Oral Daily    pantoprazole  40 mg Oral QAM AC    doxazosin  2 mg Oral Daily    valsartan  40 mg Oral BID    sodium chloride flush  5-40 mL IntraVENous 2 times per day      dextrose      bumetanide 0.1 mg/mL infusion      sodium chloride         Code Status:Full Code    The patient is ordered:  Diet: ADULT DIET; Regular;  Low Sodium (2 gm)   Sodium controlled diet Yes  Fluid restriction daily ordered (fluid restriction recommended if patient is hyponatremic and/or diuretic is initiated or increased) No  FR:   Daily Weights:   Patient Vitals for the past 96 hrs (Last 3 readings):   Weight   01/25/22 1751 245 lb (111.1 kg)     I/O: No intake or output data in the 24 hours ending 01/28/22 1424     The Heart Failure Booklet given to the patient with additional patient education addressing:  · What is Heart Failure? · Things You Can Do to Live Well with HF  · How to Take Your Medications  · How to Eat Less Salt  · Exercising Well with Heart Failure  · Signs and symptoms of HF to report  · Weight Yourself Each Day  · Home Self Management- activity, weight tracking, taking medications as prescribed, meals /diet planning (sodium and fluid restriction), how to read food labels, keeping physician follow ups, smoking cessation, follow the Heart Failure Zones  · The Heart Failure zones  · Sodium content pamphlet  · What foods I should avoid tip sheet    Instructed  to call 911 if you have any of the following symptoms:    ·    Struggling to breathe unrelieved with rest,  ·    Having chest pain, confusion or can't think clearly  ·    Have confusion or cant think clearly    Readmission Risk Score: 16.1 ( )        Discharge Plan:  I placed the Heart Failure Home Instructions in patient's discharge instructions. Per Heart Failure GWTG, the patient should have a follow-up appointment made within 7 days of discharge.     Ro Sifuentes, RN BSN, RN  Heart Failure Navigator        CONGESTIVE HEART FAILURE (CHF) AHA GUIDELINES  (Must be completed for Primary Diagnosis CHF or History of CHF)    Type of CHF:    [x] Acute   [] Chronic     [] Acute on Chronic     Ventricular Function Assessment (check):         [x] HFpEF  [] HFpEF, borderline  [] HFpEF, improved  [] HFrEF    Echocardiogram: Stress test 1/27/22   Ejection Fraction (%):  62% Angiotensin-Converting-Enzyme (ACE) inhibitor ordered:  [] Yes  [x] No (specify contraindication):    [] Contraindicated  [] Hypotensive patient who was at immediate risk of cardiogenic shock  [] Hospitalized patient who experienced marked azotemia  [] Other Contraindications  [x] Not Eligible  [] Not Tolerant  [] Patient Reason  [] System Reason  [] Other Reason    Angiotensin II receptor blockers (ARB) ordered:  [x] Yes  [] No (specify contraindication):    [] Contraindicated  [] Hypotensive patient who was at immediate risk of cardiogenic shock  [] Hospitalized patient who experienced marked azotemia  [] Other Contraindications    ARNI - Angiotensin Receptor Neprilysin Inhibitor ordered:  [] Yes  [x] No (specify contraindication):    [] ACE inhibitor use within the prior 36 hours  [] Allergy  [] Hyperkalemia  [] Hypotension  [] Renal dysfunction defined as creatinine > 2.5 mg/dL in men or > 2.0 mg/dL in women  [] Other Contraindications  [x] Not Eligible  [] Not Tolerant  [] Patient Reason  []System Reason  []Other Reason      Beta Blocker (Carvedilol, Metoprolol Succinate, or Bisoprolol) ordered:    [x] Yes  [] No (specify contraindication):    [] Contraindicated  [] Asthma  [] Fluid Overload  [] Low Blood Pressure  [] Patient recently treated with an intravenous positive inotropic agent  [] Other Contraindications  [] Not Eligible  [] Not Tolerant  [] Patient Reason  [] System Reason    SGLT2 Inhibitor ordered:  [] Yes  [x] No (specify contraindication):    [] Contraindicated  [] Patient currently on dialysis  [] Ketoacidosis  [] Known hypersensitivity to the medication  [] Type I diabetes (not approved for use in patients with Type I diabetes due to increased risk of ketoacidosis)  [] Other Contraindications  [x] Not Eligible  [] Not Tolerant  [] Patient Reason  [] System Reason  [] Other Reason    Aldosterone Antagonist ordered:  [] Yes  [x] No (specify contraindication):    [] Contraindicated  [] Allergy due to aldosterone receptor antagonist  [] Hyperkalemia  [] Renal dysfunction defined as creatinine >2.5 mg/dL in men or >2.0 mg/dL in women.   [] Other contraindications  [x] Not Eligible  [] Not Tolerant  [] Patient Reason  [] System Reason  [] Other Reason

## 2022-01-28 NOTE — PROGRESS NOTES
CHIEF COMPLAINT: SOB/CHF    HISTORY OF PRESENT ILLNESS: Patient is a 80 y.o. male seen at the request of No primary care provider on file. and seen in prior inpatient consult by Dr. Lillie Curry 5/2017. Some SOB. No CP. PAST MEDICAL/SURGICAL HISTORY:  1. Coronary artery disease. a. Reported MI at age 48 (the patient denies stenting at that time) -- at [facility].  2.  Status post CABG x4 in 2007 at [facility] -- reports have been requested. 3.  Chronic diastolic CHF. a.  12/12/2015 echocardiogram revealed mild LVH, EF of 55%, with mild prolapse of the anterior mitral valve leaflet, moderate MR, mild TR, moderate pulmonary hypertension with an RVSP of 45 mmHg. 4.  Persistent AFib. a. The patient reported that he had an ablation approximately 4-5 years ago at [facility] in [facility]. The patient was previously on Coumadin therapy. However, he reported that it was discontinued approximately 2-3 years ago due to a \"severe GI bleed. \"  Since that time, the patient has refused anticoagulation. b.  [id number] score of at least 5 (vascular disease, diabetes, age, hypertension). 5.  Hypertension. 6.  Hyperlipidemia, on statin therapy. 7.  Diabetes mellitus. 8.  COPD with no home oxygen. 9.  Remote history of tobacco abuse. He quit about 12 years ago and is a 50-pack-year smoker. 10.  History of tonsillectomy. 11.  History of eyelid surgery.     Past Medical History:   Diagnosis Date    CAD (coronary artery disease)     CHF (congestive heart failure) (HCC)     Chronic atrial fibrillation (HCC)     on Eliquis    Chronic kidney disease, stage III (moderate) ([address] 5/12/2017    COPD (chronic obstructive pulmonary disease) ([address]     Diabetes mellitus ([address]     Essential hypertension 12/12/2015    GERD (gastroesophageal reflux disease)     H. pylori infection     history of    Hx of degenerative disc disease     Hyperlipidemia     MI (myocardial infarction) ([address]     x2-- age 48 and then again in 2007    Osteoarthritis     PFO (patent foramen ovale)     Popliteal cyst     Pulmonary hypertension (HCC)        Patient Active Problem List   Diagnosis    Diabetes mellitus ([facility]    CAD (coronary artery disease)    COPD exacerbation ([facility]    Morbid obesity due to excess calories ([facility] [address]    CHF exacerbation ([facility]    Essential hypertension    Pure hypercholesterolemia    Acute on chronic diastolic congestive heart failure (HCC)    Chronic a-fib (HCC)    Chronic kidney disease, stage III (moderate) (HCC)    Heart failure (HCC)    Leg swelling       Allergies   Allergen Reactions    Iodine Hives       Current Facility-Administered Medications   Medication Dose Route Frequency Provider Last Rate Last Admin    insulin lispro (HUMALOG) injection vial 0-6 Units  0-6 Units SubCUTAneous TID WC Pennie Grullon MD        insulin lispro (HUMALOG) injection vial 0-3 Units  0-3 Units SubCUTAneous Nightly Pennie Grullon MD        glucose (GLUTOSE) 40 % oral gel 15 g  15 g Oral PRN Pennie Grullon MD        dextrose 50 % IV solution  12.5 g IntraVENous PRN Pennie Grullon MD        glucagon (rDNA) injection 1 mg  1 mg IntraMUSCular PRN Pennie Grullon MD        dextrose 5 % solution  100 mL/hr IntraVENous PRN Pennie Grullon MD        insulin glargine-yfgn (SEMGLEE-YFGN) injection vial 45 Units  45 Units SubCUTAneous Nightly Pennie Grullon MD        technetium sestamibi (CARDIOLITE) injection 35 millicurie  35 millicurie IntraVENous ONCE PRN Ebb [place] JIMENEZ MD        epoetin roly-epbx (RETACRIT) injection 3,000 Units  3,000 Units SubCUTAneous Once per day on Mon Wed Fri Waylon Toure MD   3,000 Units at 01/28/22 0859    amLODIPine (NORVASC) tablet 10 mg  10 mg Oral Daily Zannie Alto, DO   10 mg at 01/28/22 0858    apixaban (ELIQUIS) tablet 2.5 mg  2.5 mg Oral BID Zannie Alto, DO   2.5 mg at 01/28/22 0858    ipratropium-albuterol (DUONEB) nebulizer solution 3 mL  1 vial Nebulization TID Zannie Alto, DO   3 mL at 01/28/22 0805    isosorbide mononitrate (IMDUR) extended release tablet 30 mg  30 mg Oral Daily Ramseur Billet, DO   30 mg at 01/28/22 3639    metoprolol succinate (TOPROL XL) extended release tablet 50 mg  50 mg Oral Daily Ramseur Billet, DO   50 mg at 01/28/22 0858    pantoprazole (PROTONIX) tablet 40 mg  40 mg Oral QAM AC Ramseur Billet, DO   40 mg at 01/28/22 0603    doxazosin (CARDURA) tablet 2 mg  2 mg Oral Daily Ramseur Billet, DO   2 mg at 01/27/22 2058    valsartan (DIOVAN) tablet 40 mg  40 mg Oral BID Ramseur Billet, DO   40 mg at 01/28/22 2762    sodium chloride flush 0.9 % injection 5-40 mL  5-40 mL IntraVENous 2 times per day Ramseur Billet, DO   10 mL at 01/28/22 0859    sodium chloride flush 0.9 % injection 5-40 mL  5-40 mL IntraVENous PRN Ramseur Billet, DO        0.9 % sodium chloride infusion  25 mL IntraVENous PRN Ramseur Billet, DO        ondansetron (ZOFRAN-ODT) disintegrating tablet 4 mg  4 mg Oral Q8H PRN Ramseur Billet, DO        Or    ondansetron [facility]) injection 4 mg  4 mg IntraVENous Q6H PRN Ramseur Billet, DO        polyethylene glycol (GLYCOLAX) packet 17 g  17 g Oral Daily PRN Ramseur Billet, DO        acetaminophen (TYLENOL) tablet 650 mg  650 mg Oral Q6H PRN Ramseur Billet, DO        Or    acetaminophen (TYLENOL) suppository 650 mg  650 mg Rectal Q6H PRN Ramseur Billet, DO        perflutren lipid microspheres (DEFINITY) injection 1.65 mg  1.5 mL IntraVENous ONCE PRN Ramseur Billet, DO        furosemide (LASIX) injection 40 mg  40 mg IntraVENous BID Ramseur Billet, DO   40 mg at 01/28/22 0858    albuterol (PROVENTIL) nebulizer solution 2.5 mg  2.5 mg Nebulization 4x Daily PRN Ramseur Billet, DO           Social History     Socioeconomic History    Marital status:      Spouse name: Not on file    Number of children: Not on file    Years of education: Not on file    Highest education level: Not on file   Occupational History    Not on file   Tobacco Use    Smoking status: Former Smoker    Smokeless tobacco: Never Used    Tobacco comment: quit 17 years   Vaping Use    Vaping Use: Never used   Substance and Sexual Activity    Alcohol use: Yes     Alcohol/week: 1.0 standard drink     Types: 1 Glasses of wine per week     Comment: rarely wine    Drug use: No    Sexual activity: Not on file   Other Topics Concern    Not on file   Social History Narrative    Not on file     Social Determinants of Health     Financial Resource Strain:     Difficulty of Paying Living Expenses: Not on file   Food Insecurity:     Worried About [address] in the Last Year: Not on file    Ran Out of Food in the Last Year: Not on file   Transportation Needs:     Lack of Transportation (Medical): Not on file    Lack of Transportation (Non-Medical): Not on file   Physical Activity:     Days of Exercise per Week: Not on file    Minutes of Exercise per Session: Not on file   Stress:     Feeling of Stress : Not on file   Social Connections:     Frequency of Communication with Friends and Family: Not on file    Frequency of Social Gatherings with Friends and Family: Not on file    Attends Caoda[employer] Services: Not on file    Active Member of Clubs or Organizations: Not on file    Attends Club or Organization Meetings: Not on file    Marital Status: Not on file   Intimate Partner Violence:     Fear of Current or Ex-Partner: Not on file    Emotionally Abused: Not on file    Physically Abused: Not on file    Sexually Abused: Not on file   Housing Stability:     Unable to Pay for Housing in the Last Year: Not on file    Number of [place] in the Last Year: Not on file    Unstable Housing in the Last Year: Not on file       History reviewed. No pertinent family history. Review of Systems:   Heart: as above   Lungs: as above   Eyes: denies changes in vision or discharge. Ears: denies changes in hearing or pain. Nose: denies epistaxis or masses   Throat: denies sore throat or trouble swallowing.    Neuro: denies numbness, tingling, tremors. Skin: denies rashes or itching. : denies hematuria, dysuria   GI: denies vomiting, diarrhea   Psych: denies mood changed, anxiety, depression. all others negative. Physical Exam   /71   Pulse 88   Temp 97.9 °F (36.6 °C) (Oral)   Resp 18   Ht 5' 9\" (1.753 m)   Wt 245 lb (111.1 kg)   SpO2 94%   BMI 36.18 kg/m²   Constitutional: Oriented to person, place, and time. Well-developed and well-nourished. No distress. Head: Normocephalic and atraumatic. Eyes: EOM are normal. Pupils are equal, round, and reactive to light. Neck: Normal range of motion. Neck supple. No hepatojugular reflux and no JVD present. Carotid bruit is not present. No tracheal deviation present. No thyromegaly present. Cardiovascular: Normal rate, regular rhythm, normal heart sounds and intact distal pulses. Exam reveals no gallop and no friction rub. No murmur heard. Pulmonary/Chest: Effort normal and breath sounds normal. No respiratory distress. No wheezes. No rales. No tenderness. Abdominal: Soft. Bowel sounds are normal. No distension and no mass. No tenderness. No rebound and no guarding. Musculoskeletal: Normal range of motion. No edema and no tenderness. Lymphadenopathy:   No cervical adenopathy. No groin adenopathy. Neurological: Alert and oriented to person, place, and time. Skin: Skin is warm and dry. No rash noted. Not diaphoretic. No erythema. Psychiatric: Normal mood and affect.  Behavior is normal.     CBC:   Lab Results   Component Value Date    WBC 7.4 01/28/2022    RBC 3.08 01/28/2022    HGB 9.3 01/28/2022    HCT 31.0 01/28/2022    .6 01/28/2022    MCH 30.2 01/28/2022    MCHC 30.0 01/28/2022    RDW 15.7 01/28/2022     01/28/2022    MPV 8.7 01/28/2022     BMP:   Lab Results   Component Value Date     01/28/2022    K 3.8 01/28/2022    K 4.7 01/25/2022     01/28/2022    CO2 25 01/28/2022    BUN 36 01/28/2022    LABALBU 3.5 01/25/2022    CREATININE 2.0 01/28/2022    CALCIUM 8.9 01/28/2022    GFRAA 39 01/28/2022    LABGLOM 32 01/28/2022     Magnesium:    Lab Results   Component Value Date    MG 2.2 01/28/2022     Cardiac Enzymes:   Lab Results   Component Value Date    CKTOTAL 165 05/11/2017    CKTOTAL 134 05/11/2017    CKMB 1.4 05/11/2017    CKMB 1.3 05/11/2017    TROPHS 52 (H) 01/26/2022    TROPHS 52 (H) 01/26/2022    TROPHS 50 (H) 01/25/2022      PT/INR:    Lab Results   Component Value Date    PROTIME 16.5 01/25/2022    INR 1.5 01/25/2022     TSH:    Lab Results   Component Value Date    TSH 0.548 12/12/2015     Rhythm Strip: atrial fibrillation. Stress Summary 1/27/2022: The myocardial perfusion imaging was abnormal with a large-sized,   severe intensity fixed defect of the inferior wall. There is also a   moderate-sized, moderate intensity partially reversible defect of the   anterior. Overall left ventricular systolic function was normal with wall motion   abnormality. Intermediate risk study. ASSESSMENT AND PLAN:  Patient Active Problem List   Diagnosis    Diabetes mellitus ([address]    CAD (coronary artery disease)    COPD exacerbation ([address]    Morbid obesity due to excess calories ([address]    CHF exacerbation ([address]    Essential hypertension    Pure hypercholesterolemia    Acute on chronic diastolic congestive heart failure (HCC)    Chronic a-fib (HCC)    Chronic kidney disease, stage III (moderate) (HCC)    Heart failure (HCC)    Leg swelling     1. Acute on chronic diastolic CHF:    Echo pending. Diurese. BB/ARB. Change IV lasix to bumex. Stop norvasc given edema issues. 2. CAD-CABG at [facility] 2007/Elevated troponin:    Medically manage. Pharm stress as above. Medically manage for now. BB/imdur/statin. No ASA due to eliquis. 3. VHD: Hx of MVP with moderate MR and moderate PH. Echo pending. Observe. 4. Chronic Afib: Dose adjusted eliquis. BB.     5. CKD: Follow labs. 6. HTN: Observe.     7. Lipids: Statin. 8. DM    9. COPD    10. Anemia: Follow labs. 11. Hx of ARON Ballesteros D.O.   Cardiologist  Cardiology, [id number] [facility]

## 2022-01-28 NOTE — PROGRESS NOTES
Physical Therapy  Physical Therapy Initial Assessment     Name: Ancel Goodpasture  : 1940  MRN: [id number]      Date of Service: 2022    Evaluating PT:  Chetan Dorsey PT, DPT [id number]    Room #:  3754/3019-H  Diagnosis:  Heart failure ([facility] [I50.9]  Leg swelling [M79.89]  Dyspnea, unspecified type [R06.00]  Acute congestive heart failure, unspecified heart failure type ([facility] [I50.9]  PMHx/PSHx:  CAD, CHF, chronic a fib, COPD, CKD, DM, essetial HTN, GERD, HLD, MI, OA, PFO, pulmonary HTN  Procedure/Surgery:  None this admission  Precautions:  Falls, O2  Equipment Needs:  FWW    SUBJECTIVE:    Pt lives with wife in a trailer with 3 stairs to enter and 1 rail. Bed is on 1st floor and bath is on 1st floor. Pt ambulated with no AD PTA. OBJECTIVE:   Initial Evaluation  Date: 22 Treatment Short Term/ Long Term   Goals   AM-PAC 6 Clicks 63/50     Was pt agreeable to Eval/treatment? yes     Does pt have pain? LLE pain with movement, does not rate     Bed Mobility  Rolling: Pedro Pablo  Supine to sit: Pedro Pablo  Sit to supine: NT  Scooting: SBA to WOB  Rolling: Independent   Supine to sit: Independent   Sit to supine: Independent   Scooting: Independent    Transfers Sit to stand: Pedro Pablo from EOB, SBA from commode  Stand to sit: Pedro Pablo  Stand pivot: Pedro Pablo with [employer]  Sit to stand: Independent   Stand to sit: Independent   Stand pivot: Mod I with [employer]   Ambulation    15 feet x2 with [employer] Pedro Pablo  100 feet with [employer] Mod I   Stair negotiation: ascended and descended  NT  3 steps with 1 rail Mod I   ROM BUE:  Defer to OT note  BLE:  WFL     Strength BUE:  Defer to OT note  BLE:  RLE 3+/5, LLE 3/5  WFL   Balance Sitting EOB:  SBA  Dynamic Standing:  Pedro Pablo with [employer]  Sitting EOB:  Independent   Dynamic Standing:   Mod I with [employer]     Pt is A & O x 4  Sensation:  Pt denies numbness and tingling to extremities  Edema:  BLE swelling    Vitals:  SPO2 monitored throughout session on 3L via NC: 95-98%  Patient education  Pt educated on role of PT, safe use of [employer]    Patient response to education:   Pt verbalized understanding Pt demonstrated skill Pt requires further education in this area   yes yes yes     ASSESSMENT:    Conditions Requiring Skilled Therapeutic Intervention:    [x]Decreased strength     [x]Decreased ROM  [x]Decreased functional mobility  [x]Decreased balance   [x]Decreased endurance   [x]Decreased posture  []Decreased sensation  []Decreased coordination   []Decreased vision  []Decreased safety awareness   []Increased pain       Comments:  Patient semi-supine in bed upon entry and agreeable to PT evaluation. Patient able to complete bed mobility with light assist to LLE. SPO2 monitored throughout session and noted above. Patient sat EOB for approximately 8 minutes during session with focus on static sitting balance. Patient able to stand and ambulate to restroom with light assist using [employer] and VCs for walker management. Time provided for patient to void in seated position. STS from commode with no assist with use of grab bar. Patient able to stand for hand hygiene and ambulate to bedside chair to end session with all needs met. Education provided on potential need for rehabilitation at [place] for improved functional mobility. Treatment:  Patient practiced and was instructed in the following treatment:     Bed Mobility: VCs provided for sequencing and safety during mobility. Manual assist provided for completion of task.  Transfer Training: Verbal and tactile cueing provided for sequencing and safety during mobility. Manual assist provided for completion of task   Gait Training: Ambulation with [employer] and verbal cues for proper technique and safety. Manual assist provided for completion of task    Therapeutic Activities:  Patient sat EOB for approximately 8 minutes during session with focus on static sitting balance    Pt's/ family goals   1. Improve strength    Prognosis is good for reaching above PT goals.     Patient and or family understand(s) diagnosis, prognosis, and plan of care. yes    PHYSICAL THERAPY PLAN OF CARE:    PT POC is established based on physician order and patient diagnosis     Referring provider/PT Order:    01/27/22 1430  PT eval and treat  Start:  01/27/22 1430,   End:  01/27/22 1430,   ONE TIME,   Standing Count:  1 Occurrences,   R         Jose Mcgrath MD       Diagnosis:  Heart failure ([facility] [I50.9]  Leg swelling [M79.89]  Dyspnea, unspecified type [R06.00]  Acute congestive heart failure, unspecified heart failure type ([address] [I50.9]  Specific instructions for next treatment:  Progress mobility as appropriate    Current Treatment Recommendations:     [x] Strengthening to improve independence with functional mobility   [x] ROM to improve independence with functional mobility   [x] Balance Training to improve static/dynamic balance and to reduce fall risk  [x] Endurance Training to improve activity tolerance during functional mobility   [x] Transfer Training to improve safety and independence with all functional transfers   [x] Gait Training to improve gait mechanics, endurance and assess need for appropriate assistive device  [x] Stair Training in preparation for safe discharge home and/or into the community   [x] Positioning to prevent skin breakdown and contractures  [x] Safety and Education Training   [x] Patient/Caregiver Education   [x] HEP  [x] Other     PT long term treatment goals are located in above grid    Frequency of treatments: 2-5x/week x 1-2 weeks. Time in  1020  Time out  1045    Total Treatment Time  10 minutes     Evaluation Time includes thorough review of current medical information, gathering information on past medical history/social history and prior level of function, completion of standardized testing/informal observation of tasks, assessment of data and education on plan of care and goals.     CPT codes:  [x] Low Complexity PT evaluation 99252  [] Moderate Complexity PT evaluation 67456  [] High Complexity PT evaluation [id number]  [] PT Re-evaluation [id number]  [] Gait training 84096 - minutes  [] Manual therapy 87025 - minutes  [x] Therapeutic activities 72918 10 minutes  [] Therapeutic exercises 84699 - minutes  [] Neuromuscular reeducation 97178 - minutes     Clarissa Gonzalez PT, DPT  [id number]

## 2022-01-28 NOTE — PROGRESS NOTES
Hospitalist Progress Note      SYNOPSIS: Patient admitted on 2022 for <principal problem not specified>  Mr. Daphne Dickson, a 80y.o. year old male  who  has a past medical history of CAD (coronary artery disease), CHF (congestive heart failure) ([facility] Chronic atrial fibrillation ([facility] Chronic kidney disease, stage III (moderate) ([facility] COPD (chronic obstructive pulmonary disease) ([facility] Diabetes mellitus ([facility] Essential hypertension, GERD (gastroesophageal reflux disease), H. pylori infection, Hx of degenerative disc disease, Hyperlipidemia, MI (myocardial infarction) ([facility] Osteoarthritis, PFO (patent foramen ovale), Popliteal cyst, and Pulmonary hypertension ([facility]    Patient presented to the emergency department with complaints of shortness of breath and swelling of the legs. Also notes weight gain. Denies fever, chills, nausea, vomiting, chest pain, abdominal pain. Shortness of breath is worse with exertion. Has been out of his Lasix for the last 2 weeks. Vital signs within normal limits and stable. Currently 98% on 2 L nasal cannula. Laboratory studies reveal BUN 38, creatinine 2.1, proBNP 6407, troponin of 50, hemoglobin 10.0. Chest x-ray shows stable cardiomegaly. Bibasilar crackles on exam.  Patient given Lasix. Medicine consulted for admission.          SUBJECTIVE:   Patient seen and examined at bedside. He is breathing better. Cardiologist ordered stress test for yesterday which came back abnormal. He is complaining of chest pain with feels like gas indigestion this morning. EKG and stat troponin ordered. He denies any abdominal pain. Records reviewed. Stable overnight. No other overnight issues reported. Temp (24hrs), Av.4 °F (36.9 °C), Min:97.9 °F (36.6 °C), Max:98.7 °F (37.1 °C)    DIET: ADULT DIET; Regular;  Low Sodium (2 gm)  CODE: Full Code    Intake/Output Summary (Last 24 hours) at 2022 3255  Last data filed at 2022 1030  Gross per 24 hour   Intake -- Output 550 ml   Net -550 ml       OBJECTIVE:    /71   Pulse 88   Temp 97.9 °F (36.6 °C) (Oral)   Resp 18   Ht 5' 9\" (1.753 m)   Wt 245 lb (111.1 kg)   SpO2 94%   BMI 36.18 kg/m²     General appearance: No apparent distress, appears stated age and cooperative. HEENT:  Conjunctivae/corneas clear. Neck: Supple. No jugular venous distention. Respiratory: Clear to auscultation bilaterally, normal respiratory effort  Cardiovascular: Regular rate rhythm, normal S1-S2. Bilateral lower extremity edema with superficial wounds  Abdomen: Soft, nontender, nondistended  Musculoskeletal: No clubbing, cyanosis, no bilateral lower extremity edema. Brisk capillary refill. Skin:  No rashes  on visible skin  Neurologic: awake, alert and following commands     Assessment and plan:    #Acute on chronic diastolic CHF:  -Resume IV diuresis with Lasix 40 mg twice daily  -Resume beta-blockers and valsartan  -2D echo ordered  -Cardiology input appreciated     #CAD-CABG at [facility] 2007/Elevated troponin:  -Chest pain today January 28  -Re trend troponin and check EKG  -Nuclear stress test ordered by cardiology came back abnormal January 27  -Resume beta-blocker Imdur     #Valvular heart disease    -Hx of MVP with moderate MR and moderate PH.   -Echo pending     #Chronic Afib:   -Rate controlled resume metoprolol and Eliquis    #Mild acute kidney injury  -Cardiorenal syndrome  -Improved with diuresis    #CKD stage III  -Avoid nephrotoxic agent  -Consult nephrology while diuresing the patient      #Hypertension  -Controlled     #Dyslipidemia     #Type 2 diabetes mellitus  -Had hypoglycemic episode early this morning with blood sugar in the 70s  -Hold glipizide  -Decrease Lantus to 45 unit  -A1c 6.1     #COPD without exacerbation     #Anemia of chronic kidney disease    #History of GI bleed  -Monitor hemoglobin    #DVT prophylaxis patient already on Eliquis.       DISPOSITION: To be determined     Medications:  REVIEWED DAILY    Infusion Medications    dextrose      sodium chloride       Scheduled Medications    insulin lispro  0-6 Units SubCUTAneous TID WC    insulin lispro  0-3 Units SubCUTAneous Nightly    insulin glargine-yfgn  45 Units SubCUTAneous Nightly    atorvastatin  40 mg Oral Nightly    epoetin roly-epbx  3,000 Units SubCUTAneous Once per day on Mon Wed Fri    amLODIPine  10 mg Oral Daily    apixaban  2.5 mg Oral BID    ipratropium-albuterol  1 vial Nebulization TID    isosorbide mononitrate  30 mg Oral Daily    metoprolol succinate  50 mg Oral Daily    pantoprazole  40 mg Oral QAM AC    doxazosin  2 mg Oral Daily    valsartan  40 mg Oral BID    sodium chloride flush  5-40 mL IntraVENous 2 times per day    furosemide  40 mg IntraVENous BID     PRN Meds: glucose, dextrose, glucagon (rDNA), dextrose, technetium sestamibi, sodium chloride flush, sodium chloride, ondansetron **OR** ondansetron, polyethylene glycol, acetaminophen **OR** acetaminophen, perflutren lipid microspheres, albuterol    Labs:     Recent Labs     01/26/22  0707 01/27/22  0432 01/28/22 0429   WBC 7.9 9.9 7.4   HGB 10.2* 9.9* 9.3*   HCT 34.2* 33.5* 31.0*    364 314       Recent Labs     01/26/22  0225 01/27/22  0432 01/28/22  0429    143 141   K 4.8 4.4 3.8    105 104   CO2 26 26 25   BUN 36* 37* 36*   CREATININE 2.0* 1.8* 2.0*   CALCIUM 8.7 9.0 8.9   PHOS  --   --  3.7       Recent Labs     01/25/22  1823   PROT 7.6   ALKPHOS 152*   ALT 9   AST 12   BILITOT 0.7       Recent Labs     01/25/22  1823   INR 1.5       No results for input(s): CKTOTAL, TROPONINI in the last 72 hours.     Chronic labs:    Lab Results   Component Value Date    CHOL 111 01/26/2022    TRIG 51 01/26/2022    HDL 36 01/26/2022    LDLCALC 65 01/26/2022    TSH 0.548 12/12/2015    INR 1.5 01/25/2022    LABA1C 6.1 (H) 01/26/2022       Radiology: REVIEWED DAILY    +++++++++++++++++++++++++++++++++++++++++++++++++  Sandi Guerra MD  [employer] - [address] [place], [place]  +++++++++++++++++++++++++++++++++++++++++++++++++  NOTE: This report was transcribed using voice recognition software. Every effort was made to ensure accuracy; however, inadvertent computerized transcription errors may be present.

## 2022-01-29 LAB
ANION GAP SERPL CALCULATED.3IONS-SCNC: 10 MMOL/L (ref 7–16)
BASOPHILS ABSOLUTE: 0.01 E9/L (ref 0–0.2)
BASOPHILS RELATIVE PERCENT: 0.1 % (ref 0–2)
BUN BLDV-MCNC: 36 MG/DL (ref 6–23)
CALCIUM SERPL-MCNC: 8.3 MG/DL (ref 8.6–10.2)
CHLORIDE BLD-SCNC: 103 MMOL/L (ref 98–107)
CO2: 27 MMOL/L (ref 22–29)
CREAT SERPL-MCNC: 2 MG/DL (ref 0.7–1.2)
EOSINOPHILS ABSOLUTE: 0.35 E9/L (ref 0.05–0.5)
EOSINOPHILS RELATIVE PERCENT: 5 % (ref 0–6)
GFR AFRICAN AMERICAN: 39
GFR NON-AFRICAN AMERICAN: 32 ML/MIN/1.73
GLUCOSE BLD-MCNC: 82 MG/DL (ref 74–99)
HCT VFR BLD CALC: 31.2 % (ref 37–54)
HEMOGLOBIN: 9.3 G/DL (ref 12.5–16.5)
IMMATURE GRANULOCYTES #: 0.03 E9/L
IMMATURE GRANULOCYTES %: 0.4 % (ref 0–5)
LV EF: 48 %
LVEF MODALITY: NORMAL
LYMPHOCYTES ABSOLUTE: 0.75 E9/L (ref 1.5–4)
LYMPHOCYTES RELATIVE PERCENT: 10.7 % (ref 20–42)
MAGNESIUM: 2.3 MG/DL (ref 1.6–2.6)
MCH RBC QN AUTO: 30.2 PG (ref 26–35)
MCHC RBC AUTO-ENTMCNC: 29.8 % (ref 32–34.5)
MCV RBC AUTO: 101.3 FL (ref 80–99.9)
METER GLUCOSE: 115 MG/DL (ref 74–99)
METER GLUCOSE: 140 MG/DL (ref 74–99)
METER GLUCOSE: 154 MG/DL (ref 74–99)
METER GLUCOSE: 84 MG/DL (ref 74–99)
MONOCYTES ABSOLUTE: 0.95 E9/L (ref 0.1–0.95)
MONOCYTES RELATIVE PERCENT: 13.6 % (ref 2–12)
NEUTROPHILS ABSOLUTE: 4.92 E9/L (ref 1.8–7.3)
NEUTROPHILS RELATIVE PERCENT: 70.2 % (ref 43–80)
PDW BLD-RTO: 15.4 FL (ref 11.5–15)
PLATELET # BLD: 312 E9/L (ref 130–450)
PMV BLD AUTO: 8.7 FL (ref 7–12)
POTASSIUM SERPL-SCNC: 3.8 MMOL/L (ref 3.5–5)
RBC # BLD: 3.08 E12/L (ref 3.8–5.8)
SODIUM BLD-SCNC: 140 MMOL/L (ref 132–146)
TROPONIN, HIGH SENSITIVITY: 47 NG/L (ref 0–11)
TROPONIN, HIGH SENSITIVITY: 48 NG/L (ref 0–11)
TROPONIN, HIGH SENSITIVITY: 48 NG/L (ref 0–11)
TROPONIN, HIGH SENSITIVITY: 49 NG/L (ref 0–11)
TROPONIN, HIGH SENSITIVITY: 51 NG/L (ref 0–11)
WBC # BLD: 7 E9/L (ref 4.5–11.5)

## 2022-01-29 PROCEDURE — 6370000000 HC RX 637 (ALT 250 FOR IP): Performed by: FAMILY MEDICINE

## 2022-01-29 PROCEDURE — 94640 AIRWAY INHALATION TREATMENT: CPT

## 2022-01-29 PROCEDURE — 83735 ASSAY OF MAGNESIUM: CPT

## 2022-01-29 PROCEDURE — 2060000000 HC ICU INTERMEDIATE R&B

## 2022-01-29 PROCEDURE — 2700000000 HC OXYGEN THERAPY PER DAY

## 2022-01-29 PROCEDURE — 99233 SBSQ HOSP IP/OBS HIGH 50: CPT | Performed by: INTERNAL MEDICINE

## 2022-01-29 PROCEDURE — 36415 COLL VENOUS BLD VENIPUNCTURE: CPT

## 2022-01-29 PROCEDURE — 2500000003 HC RX 250 WO HCPCS: Performed by: INTERNAL MEDICINE

## 2022-01-29 PROCEDURE — 85025 COMPLETE CBC W/AUTO DIFF WBC: CPT

## 2022-01-29 PROCEDURE — 80048 BASIC METABOLIC PNL TOTAL CA: CPT

## 2022-01-29 PROCEDURE — 82962 GLUCOSE BLOOD TEST: CPT

## 2022-01-29 PROCEDURE — 84484 ASSAY OF TROPONIN QUANT: CPT

## 2022-01-29 PROCEDURE — 93306 TTE W/DOPPLER COMPLETE: CPT

## 2022-01-29 PROCEDURE — 2580000003 HC RX 258: Performed by: INTERNAL MEDICINE

## 2022-01-29 RX ADMIN — PANTOPRAZOLE SODIUM 40 MG: 40 TABLET, DELAYED RELEASE ORAL at 07:12

## 2022-01-29 RX ADMIN — METOPROLOL SUCCINATE 50 MG: 50 TABLET, FILM COATED, EXTENDED RELEASE ORAL at 09:19

## 2022-01-29 RX ADMIN — BUMETANIDE 1 MG/HR: 0.25 INJECTION INTRAMUSCULAR; INTRAVENOUS at 15:12

## 2022-01-29 RX ADMIN — VALSARTAN 40 MG: 80 TABLET, FILM COATED ORAL at 21:27

## 2022-01-29 RX ADMIN — IPRATROPIUM BROMIDE AND ALBUTEROL SULFATE 3 ML: .5; 2.5 SOLUTION RESPIRATORY (INHALATION) at 16:41

## 2022-01-29 RX ADMIN — APIXABAN 2.5 MG: 2.5 TABLET, FILM COATED ORAL at 21:26

## 2022-01-29 RX ADMIN — APIXABAN 2.5 MG: 2.5 TABLET, FILM COATED ORAL at 09:19

## 2022-01-29 RX ADMIN — IPRATROPIUM BROMIDE AND ALBUTEROL SULFATE 3 ML: .5; 2.5 SOLUTION RESPIRATORY (INHALATION) at 13:57

## 2022-01-29 RX ADMIN — DOXAZOSIN 2 MG: 2 TABLET ORAL at 21:26

## 2022-01-29 RX ADMIN — ISOSORBIDE MONONITRATE 30 MG: 30 TABLET, EXTENDED RELEASE ORAL at 09:19

## 2022-01-29 RX ADMIN — IPRATROPIUM BROMIDE AND ALBUTEROL SULFATE 3 ML: .5; 2.5 SOLUTION RESPIRATORY (INHALATION) at 20:27

## 2022-01-29 RX ADMIN — IPRATROPIUM BROMIDE AND ALBUTEROL SULFATE 3 ML: .5; 2.5 SOLUTION RESPIRATORY (INHALATION) at 08:15

## 2022-01-29 RX ADMIN — VALSARTAN 40 MG: 80 TABLET, FILM COATED ORAL at 09:19

## 2022-01-29 ASSESSMENT — PAIN SCALES - GENERAL
PAINLEVEL_OUTOF10: 0

## 2022-01-29 NOTE — PROGRESS NOTES
INPATIENT CARDIOLOGY FOLLOW-UP    Name: Kateryna Estimable    Age: 80 y.o. Date of Admission: 1/25/2022  6:02 PM    Date of Service: 1/29/2022    Chief Complaint: Follow-up for SOB/CHF    Interim History:  No new overnight cardiac complaints feeling better still has significant swelling and dyspnea. He is on IV Bumex drip amount of urine. He told me he is unable to void into the bottle most of the time he is using toilet. . Currently with no complaints of CP,, palpitations, dizziness, or lightheadedness. SR on telemetry. Review of Systems:   Cardiac: As per HPI  General: No fever, chills  Pulmonary: As per HPI  HEENT: No visual disturbances, difficult swallowing  GI: No nausea, vomiting  Endocrine: No thyroid disease or DM  Musculoskeletal: MURRELL x 4, no focal motor deficits  Skin: Intact, no rashes  Neuro/Psych: No headache or seizures    Problem List:  Patient Active Problem List   Diagnosis    Diabetes mellitus ([facility]    CAD (coronary artery disease)    COPD exacerbation ([facility]    Morbid obesity due to excess calories ([facility]    CHF exacerbation ([facility]    Essential hypertension    Pure hypercholesterolemia    Acute on chronic diastolic congestive heart failure (HCC)    Chronic a-fib (HCC)    Chronic kidney disease, stage III (moderate) (HCC)    Heart failure (HCC)    Leg swelling       Allergies:   Allergies   Allergen Reactions    Iodine Hives       Current Medications:  Current Facility-Administered Medications   Medication Dose Route Frequency Provider Last Rate Last Admin    insulin lispro (HUMALOG) injection vial 0-6 Units  0-6 Units SubCUTAneous TID WC Pennie Grullon MD        insulin lispro (HUMALOG) injection vial 0-3 Units  0-3 Units SubCUTAneous Nightly Pennie Grullon MD        glucose (GLUTOSE) 40 % oral gel 15 g  15 g Oral PRN Ramona Soriano MD        dextrose 50 % IV solution  12.5 g IntraVENous PRN Ramona Soriano MD        glucagon (rDNA) injection 1 mg  1 mg IntraMUSCular PRN Pennie MD Yadi        dextrose 5 % solution  100 mL/hr IntraVENous PRN Kim Paulino MD        insulin glargine-yfgn (SEMGLEE-YFGN) injection vial 45 Units  45 Units SubCUTAneous Nightly Kim Paulino MD        aluminum & magnesium hydroxide-simethicone (MAALOX) 200-200-20 MG/5ML suspension 30 mL  30 mL Oral Q6H PRN Kim Paulino MD   30 mL at 01/28/22 1125    bumetanide (BUMEX) 12.5 mg in sodium chloride 0.9 % 125 mL infusion  1 mg/hr IntraVENous Continuous Mozell Goodell Doe, MD 10 mL/hr at 01/29/22 1512 1 mg/hr at 01/29/22 1512    technetium sestamibi (CARDIOLITE) injection 35 millicurie  35 millicurie IntraVENous ONCE PRN Shamar Vasques MD        epoetin roly-epbx (RETACRIT) injection 3,000 Units  3,000 Units SubCUTAneous Once per day on Mon Wed Fri Araseli Alcazar MD   3,000 Units at 01/28/22 9155    apixaban (ELIQUIS) tablet 2.5 mg  2.5 mg Oral BID [facility], [facility]   2.5 mg at 01/29/22 0919    ipratropium-albuterol (DUONEB) nebulizer solution 3 mL  1 vial Nebulization TID [facility], DO   3 mL at 01/29/22 1641    isosorbide mononitrate (IMDUR) extended release tablet 30 mg  30 mg Oral Daily [facility], DO   30 mg at 01/29/22 0919    metoprolol succinate (TOPROL XL) extended release tablet 50 mg  50 mg Oral Daily [facility], DO   50 mg at 01/29/22 0919    pantoprazole (PROTONIX) tablet 40 mg  40 mg Oral QAM AC [facility], DO   40 mg at 01/29/22 3255    doxazosin (CARDURA) tablet 2 mg  2 mg Oral Daily [facility], DO   2 mg at 01/28/22 2128    valsartan (DIOVAN) tablet 40 mg  40 mg Oral BID [facility], DO   40 mg at 01/29/22 0919    sodium chloride flush 0.9 % injection 5-40 mL  5-40 mL IntraVENous 2 times per day Earl Dominique, DO   10 mL at 01/28/22 0859    sodium chloride flush 0.9 % injection 5-40 mL  5-40 mL IntraVENous PRN Earl Dominique, DO        0.9 % sodium chloride infusion  25 mL IntraVENous PRN Earl Sánchezn, DO        ondansetron (ZOFRAN-ODT) disintegrating tablet 4 mg 4 mg Oral Q8H PRN Valerie Peeling, DO        Or    ondansetron [facility]) injection 4 mg  4 mg IntraVENous Q6H PRN Valerie Peeling, DO        polyethylene glycol (GLYCOLAX) packet 17 g  17 g Oral Daily PRN Valerie Peeling, DO        acetaminophen (TYLENOL) tablet 650 mg  650 mg Oral Q6H PRN Valerie Peeling, DO        Or    acetaminophen (TYLENOL) suppository 650 mg  650 mg Rectal Q6H PRN Valerie Peeling, DO        perflutren lipid microspheres (DEFINITY) injection 1.65 mg  1.5 mL IntraVENous ONCE PRN Valerie Peeling, DO        albuterol (PROVENTIL) nebulizer solution 2.5 mg  2.5 mg Nebulization 4x Daily PRN Valerie Peeling, DO          dextrose      bumetanide 0.1 mg/mL infusion 1 mg/hr (01/29/22 1512)    sodium chloride         Physical Exam:  /62   Pulse 92   Temp 97.9 °F (36.6 °C) (Oral)   Resp 18   Ht 5' 9\" (1.753 m)   Wt 232 lb 9.6 oz (105.5 kg)   SpO2 97%   BMI 34.35 kg/m²   Wt Readings from Last 3 Encounters:   01/28/22 232 lb 9.6 oz (105.5 kg)   02/11/20 223 lb (101.2 kg)   03/24/19 216 lb (98 kg)     Appearance: Awake, alert, no acute respiratory distress  Skin: Intact, no rash  Head: Normocephalic, atraumatic  Eyes: EOMI, no conjunctival erythema  ENMT: No pharyngeal erythema, MMM, no rhinorrhea  Neck: Supple, elevated JVP, no carotid bruits  Lungs: Clear to auscultation bilaterally. No wheezes, rales, or rhonchi. Cardiac: Regular rate and rhythm, +S1S2, no murmurs apparent  Abdomen: Soft, nontender, +bowel sounds  Extremities: Moves all extremities x 4, 3+ lower extremity edema, bilateral lower extremity wounds  Neurologic: No focal motor deficits apparent, normal mood and affect  Peripheral Pulses: Intact posterior tibial pulses bilaterally    Intake/Output:    Intake/Output Summary (Last 24 hours) at 1/29/2022 1825  Last data filed at 1/29/2022 1756  Gross per 24 hour   Intake 720 ml   Output 445 ml   Net 275 ml     I/O this shift:   In: 480 [P.O.:480]  Out: 300 [facility] [Urine:445]    Laboratory Tests:  Recent Labs     01/27/22 0432 01/28/22 0429 01/29/22  0730    141 140   K 4.4 3.8 3.8    104 103   CO2 26 25 27   BUN 37* 36* 36*   CREATININE 1.8* 2.0* 2.0*   GLUCOSE 110* 70* 82   CALCIUM 9.0 8.9 8.3*     Lab Results   Component Value Date    MG 2.3 01/29/2022     No results for input(s): ALKPHOS, ALT, AST, PROT, BILITOT, BILIDIR, LABALBU in the last 72 hours. Recent Labs     01/27/22 0432 01/28/22 0429 01/29/22  0730   WBC 9.9 7.4 7.0   RBC 3.28* 3.08* 3.08*   HGB 9.9* 9.3* 9.3*   HCT 33.5* 31.0* 31.2*   .1* 100.6* 101.3*   MCH 30.2 30.2 30.2   MCHC 29.6* 30.0* 29.8*   RDW 15.6* 15.7* 15.4*    314 312   MPV 8.9 8.7 8.7     Lab Results   Component Value Date    CKTOTAL 165 05/11/2017    CKMB 1.4 05/11/2017    TROPONINI <0.01 05/11/2017    TROPONINI <0.01 05/11/2017    TROPONINI 0.01 05/11/2017     Lab Results   Component Value Date    INR 1.5 01/25/2022    PROTIME 16.5 (H) 01/25/2022     Lab Results   Component Value Date    TSH 0.548 12/12/2015     Lab Results   Component Value Date    LABA1C 6.1 (H) 01/26/2022     No results found for: EAG  Lab Results   Component Value Date    CHOL 111 01/26/2022     Lab Results   Component Value Date    TRIG 51 01/26/2022     Lab Results   Component Value Date    HDL 36 01/26/2022     Lab Results   Component Value Date    LDLCALC 65 01/26/2022     Lab Results   Component Value Date    LABVLDL 10 01/26/2022     No results found for: [facility]    Cardiac Tests:  ECG: Atrial fibrillation with PVCs or aberrantly conducted complex, possible inferior infarct, age undetermined, abnormal EKG.       Telemetry findings reviewed: Atrial fibrillation with heart rate in 80s, no arrhythmias overnight    Vitals and labs were reviewed: Blood pressure 119/62 heart rate 92, sats 92% on 2 l of O2 by nasal cannula    Labs-BUNs/creatinine 36/2>> 36/2, troponin, high-sensitivity 51>> 49>> 48, H&H 9.3/31.2.,  proBNP 11,287    Chest X-ray: Echocardiogram: Pending      Stress test-1/25/2022: The myocardial perfusion imaging was abnormal with a large-sized,   severe intensity fixed defect of the inferior wall. There is also a   moderate-sized, moderate intensity partially reversible defect of the   anterior.       Overall left ventricular systolic function was normal with wall motion   abnormality.       Intermediate risk study. Cardiac catheterization:       ASSESSMENT:  · Acute on chronic HFpEF still significantly volume overloaded, input output not accurate  · CAD status post CABG underwent an outside  · Elevated troponin, stress test showing large fixed defect in the inferior wall and partially reversible defect in anterior wall  · VHD: History of mitral valve prolapse with moderate MR and moderate from hypertension  · Chronic atrial fibrillation on Eliquis for anticoagulation  · CKD stage III, creatinine 2  · Hypertension  · Hyperlipidemia  · Type 2 diabetes  · COPD  · Mild anemia    Plan:   · Continue IV Bumex 1 mg/ per hour. Nephrology input was noted. · Strict input output charting. Monitor renal functions and electrolytes  · Echocardiogram pending to assess LV function  · Continue dose adjusted Eliquis 2.5 g p.o. twice daily for anticoagulation therapy  · Continue Toprol-XL 25 mg p.o. daily for rate control and continue valsartan 40 mg p.o. twice daily for hypertension  · Continue Imdur 30 mg p.o. daily. If echocardiogram shows any LV dysfunction then consider changing Cardura to Flomax  · Further recommendation pending above    Lusi Hillman MD., Kenya Lopez.   [facility]) Cardiology

## 2022-01-29 NOTE — PROGRESS NOTES
[facility]  Nephrology Attending Progress Note          SUBJECTIVE:     1/26/22: The pt is an 79 yo male with a pmh of cad sp acb, dm, copd, htn, pfo,  hyperlipidemia, ckd , afib, p htn who presented with sob and swelling and weight gain. He ran out of his lasix 2 months ago. Labs show cr of 2.1, k 4.7, co2 27, bun 38, cr 2.1, ca 8.7, wbc 7.9, hgb 10.2, plt 347. Baseline cr is 1.8 from 5/2017. He has been started on iv lasix q 12. He is on diovan as an outpt.  [facility].     1/27: pt seen in room, no cp or sob    1/28: Denies shortness of breath; leg edema essentially unchanged, severe    1/29: bumex drip started 1/28; no UO recorded; he has been using commode      PROBLEM LIST:    Patient Active Problem List   Diagnosis    Diabetes mellitus ([address]    CAD (coronary artery disease)    COPD exacerbation ([address]    Morbid obesity due to excess calories ([address]    CHF exacerbation ([address]    Essential hypertension    Pure hypercholesterolemia    Acute on chronic diastolic congestive heart failure (HCC)    Chronic a-fib ([address]    Chronic kidney disease, stage III (moderate) ([address]    Heart failure ([address]    Leg swelling        PAST MEDICAL HISTORY:    Past Medical History:   Diagnosis Date    CAD (coronary artery disease)     CHF (congestive heart failure) ([address]     Chronic atrial fibrillation ([address]     on Eliquis    Chronic kidney disease, stage III (moderate) ([address] 5/12/2017    COPD (chronic obstructive pulmonary disease) ([address]     Diabetes mellitus ([address]     Essential hypertension 12/12/2015    GERD (gastroesophageal reflux disease)     H. pylori infection     history of    Hx of degenerative disc disease     Hyperlipidemia     MI (myocardial infarction) ([address]     x2-- age 48 and then again in 2007    Osteoarthritis     PFO (patent foramen ovale)     Popliteal cyst     Pulmonary hypertension ([address]        DIET:    ADULT DIET; Regular;  Low Sodium (2 gm)     PHYSICAL EXAM:     Patient Vitals for the past 24 hrs:   BP Temp Temp src Pulse Resp SpO2 Weight   01/29/22 0815 -- -- -- -- -- 97 % --   01/29/22 0721 119/78 98.5 °F (36.9 °C) Oral 82 20 96 % --   01/28/22 2126 109/76 98.9 °F (37.2 °C) Oral 90 18 97 % --   01/28/22 1646 124/84 97.7 °F (36.5 °C) Oral 90 20 97 % --   01/28/22 1507 -- -- -- -- -- -- 232 lb 9.6 oz (105.5 kg)   @      Intake/Output Summary (Last 24 hours) at 1/29/2022 1339  Last data filed at 1/29/2022 1318  Gross per 24 hour   Intake 480 ml   Output 445 ml   Net 35 ml         Wt Readings from Last 3 Encounters:   01/28/22 232 lb 9.6 oz (105.5 kg)   02/11/20 223 lb (101.2 kg)   03/24/19 216 lb (98 kg)       Constitutional:  Pt is in no acute distress  Head: normocephalic, atraumatic  Neck: no JVD  Cardiovascular: regular rate and rhythm, no murmurs, gallops, or rubs  Respiratory:  No rales, rhochi, or wheezes  Gastrointestinal:  Soft, nontender, nondistended, bowel sounds x 4  Ext: Severe edema 3+ to both thighs  Skin: dry, no rash  Neuro: aaox3    MEDS (scheduled):    insulin lispro  0-6 Units SubCUTAneous TID WC    insulin lispro  0-3 Units SubCUTAneous Nightly    insulin glargine-yfgn  45 Units SubCUTAneous Nightly    epoetin roly-epbx  3,000 Units SubCUTAneous Once per day on Mon Wed Fri    apixaban  2.5 mg Oral BID    ipratropium-albuterol  1 vial Nebulization TID    isosorbide mononitrate  30 mg Oral Daily    metoprolol succinate  50 mg Oral Daily    pantoprazole  40 mg Oral QAM AC    doxazosin  2 mg Oral Daily    valsartan  40 mg Oral BID    sodium chloride flush  5-40 mL IntraVENous 2 times per day       MEDS (infusions):   dextrose      bumetanide 0.1 mg/mL infusion 1 mg/hr (01/28/22 1509)    sodium chloride         MEDS (prn):  glucose, dextrose, glucagon (rDNA), dextrose, aluminum & magnesium hydroxide-simethicone, technetium sestamibi, sodium chloride flush, sodium chloride, ondansetron **OR** ondansetron, polyethylene glycol, acetaminophen **OR** acetaminophen, perflutren lipid microspheres, albuterol    DATA:    Recent Labs     01/27/22 0432 01/28/22 0429 01/29/22  0730   WBC 9.9 7.4 7.0   HGB 9.9* 9.3* 9.3*   HCT 33.5* 31.0* 31.2*   .1* 100.6* 101.3*    314 312     Recent Labs     01/27/22 0432 01/28/22 0429 01/29/22  0730    141 140   K 4.4 3.8 3.8    104 103   CO2 26 25 27   BUN 37* 36* 36*   CREATININE 1.8* 2.0* 2.0*   LABGLOM 36 32 32   GLUCOSE 110* 70* 82   CALCIUM 9.0 8.9 8.3*   MG 2.3 2.2 2.3   PHOS  --  3.7  --        Lab Results   Component Value Date    LABALBU 3.5 01/25/2022    LABALBU 4.1 05/11/2017     Lab Results   Component Value Date    TSH 0.548 12/12/2015       Iron Studies  Lab Results   Component Value Date    IRON 51 (L) 01/26/2022    TIBC 271 01/26/2022    FERRITIN 89 01/27/2022     Vitamin B-12   Date Value Ref Range Status   01/27/2022 349 211 - 946 pg/mL Final     Folate   Date Value Ref Range Status   01/27/2022 11.8 4.8 - 24.2 ng/mL Final       No results found for: VITD25  PTH   Date Value Ref Range Status   01/28/2022 78 (H) 15 - 65 pg/mL Final       No components found for: URIC    No results found for: VOL, APPEARANCE, COLORU, LABSPEC, LABPH, LEUKBLD, NITRU, GLUCOSEU, KETUA, UROBILINOGEN, KETUA, UROBILINOGEN, BILIRUBINUR, OCBU    No results found for: LIPIDPAN      IMPRESSION/RECOMMENDATIONS:      1. arf on ckd 3b  Baseline cr 1.8 from 5/2017  Cr 2.1>2  In setting of decompensated heart failure  On lasix q 12  Ran out of home meds  Follow on the diovan  Cr is near baseline     2.   Acute decompensated diastolic heart failure  H/o mvp mod mr and mod p htn  But response to intermittent diuretic dosing  bumex drip started  Card seeing  Awaits echo  Reluctant to have a Martinez catheter at this  Reinforced importance of measuring his urine     3. H/o cad sp acb  Awaits stress     4. htn  Follow on diovan, toprol     5.anemia  Dose monique if hgb <10  Check fe b12 david Huang MD

## 2022-01-29 NOTE — PROGRESS NOTES
Hospitalist Progress Note      SYNOPSIS: Patient admitted on 2022 for <principal problem not specified>  Mr. Jeffery Desir, a 80y.o. year old male  who  has a past medical history of CAD (coronary artery disease), CHF (congestive heart failure) ([facility] Chronic atrial fibrillation ([facility] Chronic kidney disease, stage III (moderate) ([facility] COPD (chronic obstructive pulmonary disease) ([facility] Diabetes mellitus ([facility] Essential hypertension, GERD (gastroesophageal reflux disease), H. pylori infection, Hx of degenerative disc disease, Hyperlipidemia, MI (myocardial infarction) ([facility] Osteoarthritis, PFO (patent foramen ovale), Popliteal cyst, and Pulmonary hypertension ([facility]    Patient presented to the emergency department with complaints of shortness of breath and swelling of the legs. Also notes weight gain. Denies fever, chills, nausea, vomiting, chest pain, abdominal pain. Shortness of breath is worse with exertion. Has been out of his Lasix for the last 2 weeks. Vital signs within normal limits and stable. Currently 98% on 2 L nasal cannula. Laboratory studies reveal BUN 38, creatinine 2.1, proBNP 6407, troponin of 50, hemoglobin 10.0. Chest x-ray shows stable cardiomegaly. Bibasilar crackles on exam.  Patient given Lasix. Medicine consulted for admission.          SUBJECTIVE:   Patient seen and examined at bedside. He is breathing better. He denies any chest pain today    Stable overnight. No other overnight issues reported. Temp (24hrs), Av.4 °F (36.9 °C), Min:97.7 °F (36.5 °C), Max:98.9 °F (37.2 °C)    DIET: ADULT DIET; Regular;  Low Sodium (2 gm)  CODE: Full Code    Intake/Output Summary (Last 24 hours) at 2022 1151  Last data filed at 2022 2157  Gross per 24 hour   Intake 480 ml   Output --   Net 480 ml       OBJECTIVE:    /78   Pulse 82   Temp 98.5 °F (36.9 °C) (Oral)   Resp 20   Ht 5' 9\" (1.753 m)   Wt 232 lb 9.6 oz (105.5 kg)   SpO2 97%   BMI 34.35 kg/m² General appearance: No apparent distress, appears stated age and cooperative. HEENT:  Conjunctivae/corneas clear. Neck: Supple. No jugular venous distention. Respiratory: Clear to auscultation bilaterally, normal respiratory effort  Cardiovascular: Regular rate rhythm, normal S1-S2. Bilateral lower extremity edema with superficial wounds  Abdomen: Soft, nontender, nondistended  Musculoskeletal: No clubbing, cyanosis, no bilateral lower extremity edema. Brisk capillary refill. Skin:  No rashes  on visible skin  Neurologic: awake, alert and following commands     Assessment and plan:    #Acute on chronic diastolic CHF:  -IV Lasix switched to Bumex drip per cardiology and nephrology  -Resume beta-blockers and valsartan  -2D echo ordered  -Cardiology input appreciated     #CAD-CABG at [facility] 2007/Elevated troponin:  -Chest pain today January 28  -Re trend troponin and check EKG  -Nuclear stress test ordered by cardiology came back abnormal January 27  -Resume beta-blocker Imdur  -Management per cardiology     #Valvular heart disease    -Hx of MVP with moderate MR and moderate PH.   -Echo pending     #Chronic Afib:   -Rate controlled resume metoprolol and Eliquis    #Mild acute kidney injury  -Cardiorenal syndrome  -Improved with diuresis    #CKD stage III  -Avoid nephrotoxic agent  -Consult nephrology while diuresing the patient      #Hypertension  -Controlled     #Dyslipidemia     #Type 2 diabetes mellitus  -Had hypoglycemic episode early this morning with blood sugar in the 70s  -Hold glipizide  -Decrease Lantus to 45 unit  -A1c 6.1     #COPD without exacerbation     #Anemia of chronic kidney disease    #History of GI bleed  -Monitor hemoglobin    #DVT prophylaxis patient already on Eliquis.       DISPOSITION: To be determined     Medications:  REVIEWED DAILY    Infusion Medications    dextrose      bumetanide 0.1 mg/mL infusion 1 mg/hr (01/28/22 9979)    sodium chloride       Scheduled Medications    insulin lispro  0-6 Units SubCUTAneous TID     insulin lispro  0-3 Units SubCUTAneous Nightly    insulin glargine-yfgn  45 Units SubCUTAneous Nightly    epoetin roly-epbx  3,000 Units SubCUTAneous Once per day on Mon Wed Fri    apixaban  2.5 mg Oral BID    ipratropium-albuterol  1 vial Nebulization TID    isosorbide mononitrate  30 mg Oral Daily    metoprolol succinate  50 mg Oral Daily    pantoprazole  40 mg Oral QAM AC    doxazosin  2 mg Oral Daily    valsartan  40 mg Oral BID    sodium chloride flush  5-40 mL IntraVENous 2 times per day     PRN Meds: glucose, dextrose, glucagon (rDNA), dextrose, aluminum & magnesium hydroxide-simethicone, technetium sestamibi, sodium chloride flush, sodium chloride, ondansetron **OR** ondansetron, polyethylene glycol, acetaminophen **OR** acetaminophen, perflutren lipid microspheres, albuterol    Labs:     Recent Labs     01/27/22 0432 01/28/22 0429 01/29/22  0730   WBC 9.9 7.4 7.0   HGB 9.9* 9.3* 9.3*   HCT 33.5* 31.0* 31.2*    314 312       Recent Labs     01/27/22 0432 01/28/22 0429 01/29/22  0730    141 140   K 4.4 3.8 3.8    104 103   CO2 26 25 27   BUN 37* 36* 36*   CREATININE 1.8* 2.0* 2.0*   CALCIUM 9.0 8.9 8.3*   PHOS  --  3.7  --        No results for input(s): PROT, ALB, ALKPHOS, ALT, AST, BILITOT, AMYLASE, LIPASE in the last 72 hours. No results for input(s): INR in the last 72 hours. No results for input(s): Turner Rosen in the last 72 hours.     Chronic labs:    Lab Results   Component Value Date    CHOL 111 01/26/2022    TRIG 51 01/26/2022    HDL 36 01/26/2022    LDLCALC 65 01/26/2022    TSH 0.548 12/12/2015    INR 1.5 01/25/2022    LABA1C 6.1 (H) 01/26/2022       Radiology: REVIEWED DAILY    +++++++++++++++++++++++++++++++++++++++++++++++++  Dilip Plata MD  [facility] Physician - [address] [facility], [place]  +++++++++++++++++++++++++++++++++++++++++++++++++  NOTE: This report was transcribed using voice recognition software. Every effort was made to ensure accuracy; however, inadvertent computerized transcription errors may be present.

## 2022-01-29 NOTE — PROGRESS NOTES
Nutrition Education    CHF diet information provided-   Low sodium food choices, fluid intake, weight monitoring, & sample menu.  RD contact # included    Electronically signed by Mo Oneill RD, MERY on 1/29/22 at 11:23 AM EST  Contact: [phone]

## 2022-01-29 NOTE — DISCHARGE INSTR - DIET
This nutrition therapy will help you feel better and support your heart. This plan focuses on:  Limiting sodium in your diet. Salt (sodium) makes your body hold water. When your body holds too much water, you can feel shortness of breath and swelling. You can prevent these symptoms by eating less salt. Limiting fluid in your diet. For some patients, drinking too much fluid can make heart failure worse. It can cause symptoms such as shortness of breath and swelling. Limiting fluids can help relieve some of your symptoms. Managing your weight. Your registered dietitian nutritionist (RDN) can help you choose a healthy weight for your body type. You can achieve these goals by:  Reading food labels to keep track of how much sodium is in the foods you eat. Limiting foods that are high in sodium. Checking your weight to make sure you're not retaining too much fluid. Reading the Food Label: How Much Sodium Is Too Much? The nutrition plan for heart failure usually limits the sodium you get from food and drinks to 2,000 milligrams per day. Salt is the main source of sodium. Read the nutrition label to find out how much sodium is in 1 serving of a food. Select foods with 140 milligrams of sodium or less per serving. Foods with more than 300 milligrams of sodium per serving may not fit into a reduced-sodium meal plan. Check serving sizes. If you eat more than 1 serving, you will get more sodium than the amount listed. Cutting Back on Sodium  Avoid processed foods. Eat more fresh foods. Fresh and frozen fruits and vegetables without added juices or sauces are naturally low in sodium. Fresh meats are lower in sodium than processed meats, such as carrizales, sausage, and hot dogs. Read the nutrition label or ask your  to help you find a fresh meat that is low in sodium. Eat less salt, at the table and when cooking. Just 1 teaspoon of table salt has 2,300 milligrams of sodium.   Leave the salt out of recipes for pasta, casseroles, and soups. Ask your RDN how to cook your favorite recipes without sodium. Be a smart . Look for food packages that say salt-free or sodium-free.  These items contain less than 5 milligrams of sodium per serving. Very-low-sodium products contain less than 35 milligrams of sodium per serving. Low-sodium products contain less than 140 milligrams of sodium per serving. Unsalted or no added salt products may still be high in sodium. Check the nutrition label. Add flavors to your food without adding sodium. Try lemon juice, lime juice, fruit juice, or vinegar. Dry or fresh herbs add flavor. Try basil, bay leaf, dill, rosemary, parsley, tao, dry mustard, nutmeg, thyme, and paprika. Pepper, red pepper flakes, and cayenne pepper can add spice to your meals without adding sodium. Hot sauce contains sodium, but if you use just a drop or two, it will not add up to much. Buy a sodium-free seasoning blend or make your own at home. Use caution when you eat outside your home. Restaurant foods can be very high in sodium. Ask for nutrition information. Many restaurants provide nutrition facts on their menus or websites. Let your  know that you want your food to be cooked without salt. Ask for your salad dressing and sauces to come on the side.           Fluid Restriction  Your doctor may ask you to follow a fluid restriction in addition to taking diuretics (water pills). Ask your doctor how much fluid you can have. Foods that are liquid at room temperature are considered a fluid, such as popsicles, soup, ice cream, and Jell-O. Here are some common conversions that will help you measure your fluid intake every day:  1,000 milliliters = 1 liter or 4 cups 1 fluid ounce = 30 milliliters   1 cup = 240 milliliters 2,000 milliliters = 2 liters or 8 cups   1,500 milliliters = 1½ liters or 6 cups             Weight Monitoring  Weigh yourself each day.  Sudden weight gain is a sign that fluid is building up in your body. Follow these guidelines:  Weigh yourself every morning. If you gain 3 or more pounds in 1-2 days or 5 or more pounds within 1 week, call your doctor. Your doctor may adjust your medicine to get rid of the extra fluid. Talk with your doctor or RDN about what a healthy weight is for you. Talk with your doctor to find out what type of physical activity is best for you.         Foods Recommended  Food Group Recommended Foods   Grains Bread with less than 80 milligrams sodium per slice (yeast breads usually have less sodium than those made with baking soda)  Homemade bread made with reduced-sodium baking soda  Many cold cereals, especially shredded wheat and puffed rice  Oats, grits, or cream of wheat  Dry pastas, noodles, quinoa, and rice   Vegetables Fresh and frozen vegetables without added sauces, salt, or sodium  Homemade soups (salt free or low sodium)  Low-sodium or sodium-free canned vegetables and soups   Fruits Fresh and canned fruits  Dried fruits, such as raisins, cranberries, and prunes   Dairy (Milk and Milk Products) Milk or milk powder  Rice milk and soy milk  Yogurt, including Greek yogurt  Small amounts of natural, block cheese or reduced-sodium cheese (Swiss, ricotta, and fresh mozzarella are lower in sodium than others)  Regular or soft cream cheese and low-sodium cottage cheese   Protein Foods (Meat, Poultry, Fish, Beans) Fresh meats and fish  French [place] [facility]) carrizales (except if packaged in a sodium solution)  Canned or packed tuna (no more than 4 ounces at 1 serving)  Dried beans and peas; edamame (fresh soybeans)  Eggs or egg beaters (if  less than 200 mg per serving)  Unsalted nuts or peanut butter   Desserts and Snacks Fresh fruit or applesauce  Mohinder food cake  Granola bars  Unsalted pretzels, popcorn, or nuts  Pudding or gelatin with whipped cream topping  Homemade rice-crispy treats  Vanilla wafers  Frozen fruit bars   Fats Tub or liquid margarine  Unsaturated fat oils (canola, olive, corn, sunflower, safflower, peanut)   Condiments Fresh or dried herbs; low-sodium ketchup; vinegar; lemon or lime juice; pepper; salt-free seasoning mixes and marinades (salt-free seasoning blend); simple salad dressings (vinegar and oil); salt-free sauces       Foods Not Recommended  Food Group Foods Not Recommended   Grains Breads or crackers topped with salt  Cereals (hot/cold) with more than 300 milligrams sodium per serving  Biscuits, cornbread, and other quick breads prepared with baking soda  Prepackaged bread crumbs  Self-rising flours   Vegetables Canned vegetables (unless they are salt free or low sodium)  Frozen vegetables with seasoning and sauces  Sauerkraut and pickled vegetables  Canned or dried soups (unless they are salt free or low  sodium)  [place] fries and onion rings   Fruits Dried fruits preserved with sodium-containing additives   Dairy (Milk and Milk Products) Buttermilk  Processed cheeses   Cottage cheese (unless a low-sodium variety)  Feta cheese; shredded cheese (has more sodium than block cheese); singles slices and string cheese   Protein Foods (Meat, Poultry, Fish, Beans) Cured meats: carrizales, ham, sausage, pepperoni, and hot dogs  Canned meats: chili, [place] sausage, sardines, and ham  Smoked fish and meats  Frozen meals that have more than 600 milligrams sodium   Fats Salted butter or margarine   Condiments Salt, sea salt, kosher salt, onion salt, and garlic salt  Seasoning mixes containing salt (Lemon Pepper or Bouillon cubes)  Catsup or ketchup, BBQ sauce, Worcestershire and soy sauce  Salsa, pickles, olives, relish  Salad dressings: ranch, blue cheese, [place], and [place]    Alcohol Check with your doctor.        Heart Failure Sample 1-Day Menu   Breakfast 1 cup regular oatmeal made with water or milk  1 cup reduced-fat (2%) milk  1 medium banana  1 slice whole wheat bread  1 tablespoon salt-free peanut butter   Morning Snack 1/2 cup dried cranberries   Lunch 3 ounces grilled chicken breast  1 cup salad greens  Olive oil and vinegar dressing (for greens)  5 unsalted or low-sodium crackers  Fruit plate with 1/4 cup strawberries  1/2 sliced orange (for fruit plate)  1 peach half (for fruit plate)   Afternoon Snack 1 ounce low-sodium turkey  1 piece whole wheat bread   Evening Meal 3 ounces herb-baked fish  1 baked potato  2 teaspoons soft margarine (trans fat-free) (for potato)  Sliced tomatoes  1/2 cup steamed spinach drizzled with lemon juice  3-inch square of bo food cake  Fresh strawberries (2) (for cake)   Evening Snack 2 tablespoons salt-free peanut butter  5 low-sodium crackers     Dietitian Contact for further Questions: [phone]

## 2022-01-30 LAB
ANION GAP SERPL CALCULATED.3IONS-SCNC: 9 MMOL/L (ref 7–16)
BUN BLDV-MCNC: 37 MG/DL (ref 6–23)
CALCIUM SERPL-MCNC: 8.3 MG/DL (ref 8.6–10.2)
CHLORIDE BLD-SCNC: 101 MMOL/L (ref 98–107)
CO2: 30 MMOL/L (ref 22–29)
CREAT SERPL-MCNC: 1.9 MG/DL (ref 0.7–1.2)
GFR AFRICAN AMERICAN: 41
GFR NON-AFRICAN AMERICAN: 34 ML/MIN/1.73
GLUCOSE BLD-MCNC: 104 MG/DL (ref 74–99)
MAGNESIUM: 2.2 MG/DL (ref 1.6–2.6)
METER GLUCOSE: 118 MG/DL (ref 74–99)
METER GLUCOSE: 137 MG/DL (ref 74–99)
METER GLUCOSE: 179 MG/DL (ref 74–99)
POTASSIUM SERPL-SCNC: 4 MMOL/L (ref 3.5–5)
SODIUM BLD-SCNC: 140 MMOL/L (ref 132–146)
TROPONIN, HIGH SENSITIVITY: 45 NG/L (ref 0–11)
TROPONIN, HIGH SENSITIVITY: 47 NG/L (ref 0–11)
TROPONIN, HIGH SENSITIVITY: 49 NG/L (ref 0–11)
TROPONIN, HIGH SENSITIVITY: 49 NG/L (ref 0–11)

## 2022-01-30 PROCEDURE — 82962 GLUCOSE BLOOD TEST: CPT

## 2022-01-30 PROCEDURE — 6370000000 HC RX 637 (ALT 250 FOR IP): Performed by: FAMILY MEDICINE

## 2022-01-30 PROCEDURE — 6370000000 HC RX 637 (ALT 250 FOR IP): Performed by: INTERNAL MEDICINE

## 2022-01-30 PROCEDURE — 2700000000 HC OXYGEN THERAPY PER DAY

## 2022-01-30 PROCEDURE — 2580000003 HC RX 258: Performed by: INTERNAL MEDICINE

## 2022-01-30 PROCEDURE — 36415 COLL VENOUS BLD VENIPUNCTURE: CPT

## 2022-01-30 PROCEDURE — 84484 ASSAY OF TROPONIN QUANT: CPT

## 2022-01-30 PROCEDURE — 99233 SBSQ HOSP IP/OBS HIGH 50: CPT | Performed by: INTERNAL MEDICINE

## 2022-01-30 PROCEDURE — 2500000003 HC RX 250 WO HCPCS: Performed by: INTERNAL MEDICINE

## 2022-01-30 PROCEDURE — 94640 AIRWAY INHALATION TREATMENT: CPT

## 2022-01-30 PROCEDURE — 2060000000 HC ICU INTERMEDIATE R&B

## 2022-01-30 PROCEDURE — 80048 BASIC METABOLIC PNL TOTAL CA: CPT

## 2022-01-30 PROCEDURE — 83735 ASSAY OF MAGNESIUM: CPT

## 2022-01-30 RX ORDER — TAMSULOSIN HYDROCHLORIDE 0.4 MG/1
0.4 CAPSULE ORAL DAILY
Status: DISCONTINUED | OUTPATIENT
Start: 2022-01-30 | End: 2022-02-07 | Stop reason: HOSPADM

## 2022-01-30 RX ADMIN — APIXABAN 2.5 MG: 2.5 TABLET, FILM COATED ORAL at 22:46

## 2022-01-30 RX ADMIN — VALSARTAN 40 MG: 80 TABLET, FILM COATED ORAL at 09:56

## 2022-01-30 RX ADMIN — IPRATROPIUM BROMIDE AND ALBUTEROL SULFATE 3 ML: .5; 2.5 SOLUTION RESPIRATORY (INHALATION) at 14:10

## 2022-01-30 RX ADMIN — ISOSORBIDE MONONITRATE 30 MG: 30 TABLET, EXTENDED RELEASE ORAL at 09:56

## 2022-01-30 RX ADMIN — IPRATROPIUM BROMIDE AND ALBUTEROL SULFATE 3 ML: .5; 2.5 SOLUTION RESPIRATORY (INHALATION) at 21:13

## 2022-01-30 RX ADMIN — METOPROLOL SUCCINATE 50 MG: 50 TABLET, FILM COATED, EXTENDED RELEASE ORAL at 09:56

## 2022-01-30 RX ADMIN — PANTOPRAZOLE SODIUM 40 MG: 40 TABLET, DELAYED RELEASE ORAL at 05:26

## 2022-01-30 RX ADMIN — APIXABAN 2.5 MG: 2.5 TABLET, FILM COATED ORAL at 09:56

## 2022-01-30 RX ADMIN — BUMETANIDE 1 MG/HR: 0.25 INJECTION INTRAMUSCULAR; INTRAVENOUS at 17:50

## 2022-01-30 RX ADMIN — TAMSULOSIN HYDROCHLORIDE 0.4 MG: 0.4 CAPSULE ORAL at 09:57

## 2022-01-30 RX ADMIN — IPRATROPIUM BROMIDE AND ALBUTEROL SULFATE 3 ML: .5; 2.5 SOLUTION RESPIRATORY (INHALATION) at 10:44

## 2022-01-30 RX ADMIN — VALSARTAN 40 MG: 80 TABLET, FILM COATED ORAL at 22:47

## 2022-01-30 ASSESSMENT — PAIN SCALES - GENERAL
PAINLEVEL_OUTOF10: 0

## 2022-01-30 NOTE — PROGRESS NOTES
[facility]  Nephrology Attending Progress Note          SUBJECTIVE:     1/26/22: The pt is an 81 yo male with a pmh of cad sp acb, dm, copd, htn, pfo,  hyperlipidemia, ckd , afib, p htn who presented with sob and swelling and weight gain. He ran out of his lasix 2 months ago. Labs show cr of 2.1, k 4.7, co2 27, bun 38, cr 2.1, ca 8.7, wbc 7.9, hgb 10.2, plt 347. Baseline cr is 1.8 from 5/2017. He has been started on iv lasix q 12. He is on diovan as an outpt. [facility].     1/27: pt seen in room, no cp or sob    1/28: Denies shortness of breath; leg edema essentially unchanged, severe    1/29: bumex drip started 1/28; no UO recorded; he has been using commode    1/30: He denies shortness of breath; he thinks his leg edema is decreasing      PROBLEM LIST:    Patient Active Problem List   Diagnosis    Diabetes mellitus ([address]    CAD (coronary artery disease)    COPD exacerbation ([address]    Morbid obesity due to excess calories ([address]    CHF exacerbation ([address]    Essential hypertension    Pure hypercholesterolemia    Acute on chronic diastolic congestive heart failure (HCC)    Chronic a-fib ([address]    Chronic kidney disease, stage III (moderate) ([address]    Heart failure ([address]    Leg swelling          DIET:    ADULT DIET; Regular;  Low Sodium (2 gm)     PHYSICAL EXAM:     Patient Vitals for the past 24 hrs:   BP Temp Temp src Pulse Resp SpO2   01/30/22 1045 -- -- -- -- 18 96 %   01/30/22 0953 123/67 -- -- 67 -- --   01/30/22 0755 127/80 98.1 °F (36.7 °C) Oral 84 20 96 %   01/30/22 0102 112/87 97.8 °F (36.6 °C) Oral 82 27 --   01/29/22 2015 115/66 97.9 °F (36.6 °C) Oral 76 18 97 %   01/29/22 1615 119/62 97.9 °F (36.6 °C) Oral 92 18 97 %   01/29/22 1358 -- -- -- -- -- 97 %   @      Intake/Output Summary (Last 24 hours) at 1/30/2022 1109  Last data filed at 1/30/2022 0501  Gross per 24 hour   Intake 480 ml   Output 1220 ml   Net -740 ml Wt Readings from Last 3 Encounters:   01/28/22 232 lb 9.6 oz (105.5 kg)   02/11/20 223 lb (101.2 kg)   03/24/19 216 lb (98 kg)       Constitutional:  Pt is in no acute distress  Head: normocephalic, atraumatic  Neck: no JVD  Cardiovascular: regular rate and rhythm, no murmurs, gallops, or rubs  Respiratory:  No rales, rhochi, or wheezes  Gastrointestinal:  Soft, nontender, nondistended, bowel sounds x 4  Ext: Severe edema 3+ to both thighs  Skin: dry, no rash  Neuro: aaox3    MEDS (scheduled):    tamsulosin  0.4 mg Oral Daily    insulin lispro  0-6 Units SubCUTAneous TID WC    insulin lispro  0-3 Units SubCUTAneous Nightly    insulin glargine-yfgn  45 Units SubCUTAneous Nightly    epoetin roly-epbx  3,000 Units SubCUTAneous Once per day on Mon Wed Fri    apixaban  2.5 mg Oral BID    ipratropium-albuterol  1 vial Nebulization TID    isosorbide mononitrate  30 mg Oral Daily    metoprolol succinate  50 mg Oral Daily    pantoprazole  40 mg Oral QAM AC    valsartan  40 mg Oral BID    sodium chloride flush  5-40 mL IntraVENous 2 times per day       MEDS (infusions):   dextrose      bumetanide 0.1 mg/mL infusion 1 mg/hr (01/29/22 1512)    sodium chloride         MEDS (prn):  glucose, dextrose, glucagon (rDNA), dextrose, aluminum & magnesium hydroxide-simethicone, technetium sestamibi, sodium chloride flush, sodium chloride, ondansetron **OR** ondansetron, polyethylene glycol, acetaminophen **OR** acetaminophen, perflutren lipid microspheres, albuterol    DATA:    Recent Labs     01/28/22  0429 01/29/22  0730   WBC 7.4 7.0   HGB 9.3* 9.3*   HCT 31.0* 31.2*   .6* 101.3*    312     Recent Labs     01/28/22  0429 01/29/22  0730 01/30/22  0504    140 140   K 3.8 3.8 4.0    103 101   CO2 25 27 30*   BUN 36* 36* 37*   CREATININE 2.0* 2.0* 1.9*   LABGLOM 32 32 34   GLUCOSE 70* 82 104*   CALCIUM 8.9 8.3* 8.3*   MG 2.2 2.3 2.2   PHOS 3.7  --   --        Lab Results   Component Value Date LABALBU 3.5 01/25/2022    LABALBU 4.1 05/11/2017     Lab Results   Component Value Date    TSH 0.548 12/12/2015       Iron Studies  Lab Results   Component Value Date    IRON 51 (L) 01/26/2022    TIBC 271 01/26/2022    FERRITIN 89 01/27/2022     Vitamin B-12   Date Value Ref Range Status   01/27/2022 349 211 - 946 pg/mL Final     Folate   Date Value Ref Range Status   01/27/2022 11.8 4.8 - 24.2 ng/mL Final       No results found for: VITD25  PTH   Date Value Ref Range Status   01/28/2022 78 (H) 15 - 65 pg/mL Final       No components found for: URIC    No results found for: VOL, APPEARANCE, COLORU, LABSPEC, LABPH, LEUKBLD, NITRU, GLUCOSEU, KETUA, UROBILINOGEN, KETUA, UROBILINOGEN, BILIRUBINUR, OCBU    No results found for: LIPIDPAN      IMPRESSION/RECOMMENDATIONS:      1. arf on ckd 3b  Baseline cr 1.8 from 5/2017  Cr 2.1>2  In setting of decompensated heart failure  Ran out of home meds  Follow on the [facility]  Cr is near baseline     2. Acute decompensated diastolic heart failure  H/o mvp mod mr and mod p htn  But response to intermittent diuretic dosing  bumex drip started 1/28  Card seeing  Awaits echo  Reluctant to have a Martinez catheter at this  Reinforced importance of measuring his urine     3. H/o cad sp acb  Awaits stress     4. HTN  Follow on diovan, toprol     5. Anemia  Dose monique if hgb <10  Check fe b12 david Martino MD

## 2022-01-30 NOTE — PROGRESS NOTES
Hospitalist Progress Note      SYNOPSIS: Patient admitted on 2022 for <principal problem not specified>  Mr. Roslaes, a 80y.o. year old male  who  has a past medical history of CAD (coronary artery disease), CHF (congestive heart failure) ([facility] Chronic atrial fibrillation ([facility] Chronic kidney disease, stage III (moderate) ([facility] COPD (chronic obstructive pulmonary disease) ([facility] Diabetes mellitus ([facility] Essential hypertension, GERD (gastroesophageal reflux disease), H. pylori infection, Hx of degenerative disc disease, Hyperlipidemia, MI (myocardial infarction) ([facility] Osteoarthritis, PFO (patent foramen ovale), Popliteal cyst, and Pulmonary hypertension ([facility]    Patient presented to the emergency department with complaints of shortness of breath and swelling of the legs. Also notes weight gain. Denies fever, chills, nausea, vomiting, chest pain, abdominal pain. Shortness of breath is worse with exertion. Has been out of his Lasix for the last 2 weeks. Vital signs within normal limits and stable. Currently 98% on 2 L nasal cannula. Laboratory studies reveal BUN 38, creatinine 2.1, proBNP 6407, troponin of 50, hemoglobin 10.0. Chest x-ray shows stable cardiomegaly. Bibasilar crackles on exam.  Patient given Lasix. Medicine consulted for admission.          SUBJECTIVE:   Patient seen and examined at bedside. He is breathing better. Continues to diurese  with Bumex drip. He denies any chest pain today    Stable overnight. No other overnight issues reported. Temp (24hrs), Av.9 °F (36.6 °C), Min:97.8 °F (36.6 °C), Max:98.1 °F (36.7 °C)    DIET: ADULT DIET; Regular;  Low Sodium (2 gm)  CODE: Full Code    Intake/Output Summary (Last 24 hours) at 2022 1022  Last data filed at 2022 0501  Gross per 24 hour   Intake 480 ml   Output 1220 ml   Net -740 ml       OBJECTIVE:    /67   Pulse 67   Temp 98.1 °F (36.7 °C) (Oral)   Resp 20   Ht 5' 9\" (1.753 m)   Wt 232 lb 9.6 oz (105.5 kg)   SpO2 96%   BMI 34.35 kg/m²     General appearance: No apparent distress, appears stated age and cooperative. HEENT:  Conjunctivae/corneas clear. Neck: Supple. No jugular venous distention. Respiratory: Clear to auscultation bilaterally, normal respiratory effort  Cardiovascular: Regular rate rhythm, normal S1-S2. Bilateral lower extremity edema with superficial wounds  Abdomen: Soft, nontender, nondistended  Musculoskeletal: No clubbing, cyanosis, no bilateral lower extremity edema. Brisk capillary refill. Skin:  No rashes  on visible skin  Neurologic: awake, alert and following commands     Assessment and plan:    #Acute on chronic diastolic CHF:  -IV Lasix switched to Bumex drip per cardiology and nephrology  -Resume beta-blockers and valsartan  -2D echo ordered  -Cardiology input appreciated     #CAD-CABG at [facility] 2007/Elevated troponin:  -Chest pain today January 28  -Re trend troponin and check EKG  -Nuclear stress test ordered by cardiology came back abnormal January 27  -Resume beta-blocker Imdur  -Management per cardiology     #Valvular heart disease    -Hx of MVP with moderate MR and moderate PH.   -Echo pending     #Chronic Afib:   -Rate controlled resume metoprolol and Eliquis    #Mild acute kidney injury  -Cardiorenal syndrome  -Improved with diuresis    #CKD stage III  -Avoid nephrotoxic agent  -Consult nephrology while diuresing the patient      #Hypertension  -Controlled     #Dyslipidemia     #Type 2 diabetes mellitus  -Had hypoglycemic episode early this morning with blood sugar in the 70s  -Hold glipizide  -Decrease Lantus to 45 unit  -A1c 6.1     #COPD without exacerbation     #Anemia of chronic kidney disease    #History of GI bleed  -Monitor hemoglobin    #DVT prophylaxis patient already on Eliquis.       DISPOSITION: To be determined     Medications:  REVIEWED DAILY    Infusion Medications    dextrose      bumetanide 0.1 mg/mL infusion 1 mg/hr (01/29/22 0797)    sodium chloride       Scheduled Medications    tamsulosin  0.4 mg Oral Daily    insulin lispro  0-6 Units SubCUTAneous TID WC    insulin lispro  0-3 Units SubCUTAneous Nightly    insulin glargine-yfgn  45 Units SubCUTAneous Nightly    epoetin roly-epbx  3,000 Units SubCUTAneous Once per day on Mon Wed Fri    apixaban  2.5 mg Oral BID    ipratropium-albuterol  1 vial Nebulization TID    isosorbide mononitrate  30 mg Oral Daily    metoprolol succinate  50 mg Oral Daily    pantoprazole  40 mg Oral QAM AC    valsartan  40 mg Oral BID    sodium chloride flush  5-40 mL IntraVENous 2 times per day     PRN Meds: glucose, dextrose, glucagon (rDNA), dextrose, aluminum & magnesium hydroxide-simethicone, technetium sestamibi, sodium chloride flush, sodium chloride, ondansetron **OR** ondansetron, polyethylene glycol, acetaminophen **OR** acetaminophen, perflutren lipid microspheres, albuterol    Labs:     Recent Labs     01/28/22 0429 01/29/22  0730   WBC 7.4 7.0   HGB 9.3* 9.3*   HCT 31.0* 31.2*    312       Recent Labs     01/28/22 0429 01/29/22  0730 01/30/22  0504    140 140   K 3.8 3.8 4.0    103 101   CO2 25 27 30*   BUN 36* 36* 37*   CREATININE 2.0* 2.0* 1.9*   CALCIUM 8.9 8.3* 8.3*   PHOS 3.7  --   --        No results for input(s): PROT, ALB, ALKPHOS, ALT, AST, BILITOT, AMYLASE, LIPASE in the last 72 hours. No results for input(s): INR in the last 72 hours. No results for input(s): Sandi Fuse in the last 72 hours.     Chronic labs:    Lab Results   Component Value Date    CHOL 111 01/26/2022    TRIG 51 01/26/2022    HDL 36 01/26/2022    LDLCALC 65 01/26/2022    TSH 0.548 12/12/2015    INR 1.5 01/25/2022    LABA1C 6.1 (H) 01/26/2022       Radiology: REVIEWED DAILY    +++++++++++++++++++++++++++++++++++++++++++++++++  Ike Miguel MD  [facility] - [address] [place], [place]  +++++++++++++++++++++++++++++++++++++++++++++++++  NOTE: This report was transcribed using voice recognition software. Every effort was made to ensure accuracy; however, inadvertent computerized transcription errors may be present.

## 2022-01-30 NOTE — PROGRESS NOTES
INPATIENT CARDIOLOGY FOLLOW-UP    Name: Leslie Natarajan    Age: 80 y.o. Date of Admission: 1/25/2022  6:02 PM    Date of Service: 1/30/2022    Chief Complaint: Follow-up for SOB/CHF    Interim History:  No new overnight cardiac complaints feeling better and swelling improving. Urine out increased. He is on IV Bumex drip amount of urine. He is using the urinal to void. Currently with no complaints of CP, palpitations, dizziness, or lightheadedness. SR on telemetry. Review of Systems:   Cardiac: As per HPI  General: No fever, chills  Pulmonary: As per HPI  HEENT: No visual disturbances, difficult swallowing  GI: No nausea, vomiting  Endocrine: No thyroid disease or DM  Musculoskeletal: MURRELL x 4, no focal motor deficits  Skin: Intact, no rashes  Neuro/Psych: No headache or seizures    Problem List:  Patient Active Problem List   Diagnosis    Diabetes mellitus ([facility]    CAD (coronary artery disease)    COPD exacerbation ([facility]    Morbid obesity due to excess calories ([address]    CHF exacerbation ([facility]    Essential hypertension    Pure hypercholesterolemia    Acute on chronic diastolic congestive heart failure (HCC)    Chronic a-fib (HCC)    Chronic kidney disease, stage III (moderate) (HCC)    Heart failure (HCC)    Leg swelling       Allergies: Allergies   Allergen Reactions    Iodine Hives     Other reaction(s):  HIVES       Current Medications:  Current Facility-Administered Medications   Medication Dose Route Frequency Provider Last Rate Last Admin    insulin lispro (HUMALOG) injection vial 0-6 Units  0-6 Units SubCUTAneous TID WC Pennie Grullon MD        insulin lispro (HUMALOG) injection vial 0-3 Units  0-3 Units SubCUTAneous Nightly Pennie Grullon MD        glucose (GLUTOSE) 40 % oral gel 15 g  15 g Oral PRN Jesus Gary MD        dextrose 50 % IV solution  12.5 g IntraVENous PRN Jesus Gary MD        glucagon (rDNA) injection 1 mg  1 mg IntraMUSCular PRN Jesus Gary MD        dextrose 5 % solution  100 mL/hr IntraVENous PRN Jamaal Desir MD        insulin glargine-yfgn (SEMGLEE-YFGN) injection vial 45 Units  45 Units SubCUTAneous Nightly Jamaal Desir MD        aluminum & magnesium hydroxide-simethicone (MAALOX) 200-200-20 MG/5ML suspension 30 mL  30 mL Oral Q6H PRN Jamaal Desir MD   30 mL at 01/28/22 1125    bumetanide (BUMEX) 12.5 mg in sodium chloride 0.9 % 125 mL infusion  1 mg/hr IntraVENous Continuous Alexis Herbert Roberto MD 10 mL/hr at 01/29/22 1512 1 mg/hr at 01/29/22 1512    technetium sestamibi (CARDIOLITE) injection 35 millicurie  35 millicurie IntraVENous ONCE PRN Rhonda Dale MD        epoetin roly-epbx (RETACRIT) injection 3,000 Units  3,000 Units SubCUTAneous Once per day on Mon Wed Fri Earline Snowden MD   3,000 Units at 01/28/22 8528    apixaban (ELIQUIS) tablet 2.5 mg  2.5 mg Oral BID Conchetta Poor, DO   2.5 mg at 01/29/22 2126    ipratropium-albuterol (DUONEB) nebulizer solution 3 mL  1 vial Nebulization TID Conchetta Poor, DO   3 mL at 01/29/22 2027    isosorbide mononitrate (IMDUR) extended release tablet 30 mg  30 mg Oral Daily Conchetta Poor, DO   30 mg at 01/29/22 0919    metoprolol succinate (TOPROL XL) extended release tablet 50 mg  50 mg Oral Daily Conchetta Poor, DO   50 mg at 01/29/22 0919    pantoprazole (PROTONIX) tablet 40 mg  40 mg Oral QAM AC Conchetta Poor, DO   40 mg at 01/30/22 8451    doxazosin (CARDURA) tablet 2 mg  2 mg Oral Daily Conchetta Poor, DO   2 mg at 01/29/22 2126    valsartan (DIOVAN) tablet 40 mg  40 mg Oral BID Conchetta Poor, DO   40 mg at 01/29/22 2127    sodium chloride flush 0.9 % injection 5-40 mL  5-40 mL IntraVENous 2 times per day Conchetta Poor, DO   10 mL at 01/28/22 0859    sodium chloride flush 0.9 % injection 5-40 mL  5-40 mL IntraVENous PRN Conchetta Poor, DO        0.9 % sodium chloride infusion  25 mL IntraVENous PRN Conchetta Poor, DO        ondansetron (ZOFRAN-ODT) disintegrating tablet 4 mg  4 mg Oral Q8H PRN Eleonora Kim Ran Melendez, DO        Or    ondansetron [facility]) injection 4 mg  4 mg IntraVENous Q6H PRN Valerie Peeling, DO        polyethylene glycol [facility]) packet 17 g  17 g Oral Daily PRN Valerie Peeling, DO        acetaminophen (TYLENOL) tablet 650 mg  650 mg Oral Q6H PRN Valerie Peeling, DO        Or    acetaminophen (TYLENOL) suppository 650 mg  650 mg Rectal Q6H PRN Valerie Peeling, DO        perflutren lipid microspheres (DEFINITY) injection 1.65 mg  1.5 mL IntraVENous ONCE PRN Valerie Peeling, DO        albuterol (PROVENTIL) nebulizer solution 2.5 mg  2.5 mg Nebulization 4x Daily PRN Valerie Peeling, DO          dextrose      bumetanide 0.1 mg/mL infusion 1 mg/hr (01/29/22 1512)    sodium chloride         Physical Exam:  /80   Pulse 84   Temp 98.1 °F (36.7 °C) (Oral)   Resp 20   Ht 5' 9\" (1.753 m)   Wt 232 lb 9.6 oz (105.5 kg)   SpO2 96%   BMI 34.35 kg/m²   Wt Readings from Last 3 Encounters:   01/28/22 232 lb 9.6 oz (105.5 kg)   02/11/20 223 lb (101.2 kg)   03/24/19 216 lb (98 kg)     Appearance: Awake, alert, no acute respiratory distress  Skin: Intact, no rash  Head: Normocephalic, atraumatic  Eyes: EOMI, no conjunctival erythema  ENMT: No pharyngeal erythema, MMM, no rhinorrhea  Neck: Supple, elevated JVP, no carotid bruits  Lungs: Clear to auscultation bilaterally. No wheezes, rales, or rhonchi. Cardiac: Regular rate and rhythm, +S1S2, no murmurs apparent  Abdomen: Soft, nontender, +bowel sounds  Extremities: Moves all extremities x 4, 3+ lower extremity edema, bilateral lower extremity wounds  Neurologic: No focal motor deficits apparent, normal mood and affect  Peripheral Pulses: Intact posterior tibial pulses bilaterally    Intake/Output:    Intake/Output Summary (Last 24 hours) at 1/30/2022 0834  Last data filed at 1/30/2022 0501  Gross per 24 hour   Intake 600 ml   Output 1220 ml   Net -620 ml     No intake/output data recorded.     Laboratory Tests:  Recent Labs     01/28/22  0429 01/29/22  6348 01/30/22  0504    140 140   K 3.8 3.8 4.0    103 101   CO2 25 27 30*   BUN 36* 36* 37*   CREATININE 2.0* 2.0* 1.9*   GLUCOSE 70* 82 104*   CALCIUM 8.9 8.3* 8.3*     Lab Results   Component Value Date    MG 2.2 01/30/2022     No results for input(s): ALKPHOS, ALT, AST, PROT, BILITOT, BILIDIR, LABALBU in the last 72 hours. Recent Labs     01/28/22  0429 01/29/22  0730   WBC 7.4 7.0   RBC 3.08* 3.08*   HGB 9.3* 9.3*   HCT 31.0* 31.2*   .6* 101.3*   MCH 30.2 30.2   MCHC 30.0* 29.8*   RDW 15.7* 15.4*    312   MPV 8.7 8.7     Lab Results   Component Value Date    CKTOTAL 165 05/11/2017    CKMB 1.4 05/11/2017    TROPONINI <0.01 05/11/2017    TROPONINI <0.01 05/11/2017    TROPONINI 0.01 05/11/2017     Lab Results   Component Value Date    INR 1.5 01/25/2022    PROTIME 16.5 (H) 01/25/2022     Lab Results   Component Value Date    TSH 0.548 12/12/2015     Lab Results   Component Value Date    LABA1C 6.1 (H) 01/26/2022     No results found for: EAG  Lab Results   Component Value Date    CHOL 111 01/26/2022     Lab Results   Component Value Date    TRIG 51 01/26/2022     Lab Results   Component Value Date    HDL 36 01/26/2022     Lab Results   Component Value Date    LDLCALC 65 01/26/2022     Lab Results   Component Value Date    LABVLDL 10 01/26/2022     No results found for: [facility]    Cardiac Tests:  ECG: Atrial fibrillation with PVCs or aberrantly conducted complex, possible inferior infarct, age undetermined, abnormal EKG. Telemetry findings reviewed: Atrial fibrillation with heart rate in 80s with frequent premature nuclear complexes 1 run of 8 beat ventricular tachycardia. Vitals and labs were reviewed: Blood pressure 127/80 heart rate 84, sats 96 % on 2 l of O2 by nasal cannula    Labs-BUNs/creatinine 36/2>> 36/2>> 37/1.9, troponin, high-sensitivity 51>> 49>> 48, H&H 9.3/31.2>> 9.3/31 point. ,  proBNP 11,287    Chest X-ray:       Echocardiogram-1/29/2020:   Left ventricle size is normal.   Ejection fraction is visually estimated at 45-50%. Overall ejection fraction mildly decreased . Mild concentric left ventricular hypertrophy. Stage III diastolic dysfunction. The left atrium is severely dilated. Normal right ventricular size. Right ventricle global systolic function is moderately reduced . TAPSE 12   mm. Mild mitral annular calcification. S/p [id number] repair of the mitral valve with well seated MitraClip. Mitral valve mean gradient 5 mmHg. Mild mitral regurgitation with posteriorly directed jet. No hemodynamically significant aortic stenosis is present. BELLO by direct   planimetry is 3.9 cm2. RVSP is 41 mmHg. Pulmonary hypertension is mild . No evidence for hemodynamically significant pericardial effusion. Stress test-1/25/2022: The myocardial perfusion imaging was abnormal with a large-sized,   severe intensity fixed defect of the inferior wall. There is also a   moderate-sized, moderate intensity partially reversible defect of the   anterior.       Overall left ventricular systolic function was normal with wall motion   abnormality.       Intermediate risk study.          Cardiac catheterization:       ASSESSMENT:  · Acute on chronic HFpEF still significantly volume overloaded, input output still not accurate but clinically improving  · CAD status post CABG underwent an outside  · VHD: Status post [id number] repair of the mitral valve with well-seated mitral clip, mild mitral regurgitation  · Cardiomyopathy EF 45 to 50% with stage III diastolic dysfunction  · ACC/AHA stage C.,  NYHA functional class III  · Elevated troponin, stress test showing large fixed defect in the inferior wall and partially reversible defect in anterior wall  · VHD: History of mitral valve prolapse with moderate MR and moderate from hypertension  · Chronic atrial fibrillation on Eliquis for anticoagulation  · CKD stage III, creatinine 2>> 1.9  · Hypertension, well controlled blood pressure 127/80  · Hyperlipidemia  · Type 2 diabetes  · COPD  · Mild anemia    Plan:   · Continue IV Bumex 1 mg/ per hour as per nephrology  · Strict input output charting. Monitor renal functions and electrolytes  · Echocardiogram pending to assess LV function  · Continue dose adjusted Eliquis 2.5 g p.o. twice daily for anticoagulation therapy  · On GDMT, continue Toprol-XL 25 mg p.o. daily for rate control and continue valsartan 40 mg p.o. twice daily for hypertension  · Continue Imdur 30 mg p.o. daily. Will change Cardura to Flomax due to systolic heart failure, alpha blockers not indicated patient with systolic heart failure. · Further recommendation pending above    Meena Lynn MD., [facility].   [facility]) Cardiology

## 2022-01-31 LAB
METER GLUCOSE: 118 MG/DL (ref 74–99)
METER GLUCOSE: 141 MG/DL (ref 74–99)
PRO-BNP: 8917 PG/ML (ref 0–450)
TROPONIN, HIGH SENSITIVITY: 47 NG/L (ref 0–11)
TROPONIN, HIGH SENSITIVITY: 49 NG/L (ref 0–11)

## 2022-01-31 PROCEDURE — 82962 GLUCOSE BLOOD TEST: CPT

## 2022-01-31 PROCEDURE — 94640 AIRWAY INHALATION TREATMENT: CPT

## 2022-01-31 PROCEDURE — 99233 SBSQ HOSP IP/OBS HIGH 50: CPT | Performed by: INTERNAL MEDICINE

## 2022-01-31 PROCEDURE — 83880 ASSAY OF NATRIURETIC PEPTIDE: CPT

## 2022-01-31 PROCEDURE — 36415 COLL VENOUS BLD VENIPUNCTURE: CPT

## 2022-01-31 PROCEDURE — 84484 ASSAY OF TROPONIN QUANT: CPT

## 2022-01-31 PROCEDURE — 2060000000 HC ICU INTERMEDIATE R&B

## 2022-01-31 PROCEDURE — 2500000003 HC RX 250 WO HCPCS: Performed by: INTERNAL MEDICINE

## 2022-01-31 PROCEDURE — 6370000000 HC RX 637 (ALT 250 FOR IP): Performed by: FAMILY MEDICINE

## 2022-01-31 PROCEDURE — 2580000003 HC RX 258: Performed by: INTERNAL MEDICINE

## 2022-01-31 PROCEDURE — 6360000002 HC RX W HCPCS: Performed by: INTERNAL MEDICINE

## 2022-01-31 PROCEDURE — 97535 SELF CARE MNGMENT TRAINING: CPT

## 2022-01-31 PROCEDURE — 2700000000 HC OXYGEN THERAPY PER DAY

## 2022-01-31 PROCEDURE — 2580000003 HC RX 258: Performed by: FAMILY MEDICINE

## 2022-01-31 PROCEDURE — 6370000000 HC RX 637 (ALT 250 FOR IP): Performed by: INTERNAL MEDICINE

## 2022-01-31 RX ADMIN — IPRATROPIUM BROMIDE AND ALBUTEROL SULFATE 3 ML: .5; 2.5 SOLUTION RESPIRATORY (INHALATION) at 12:13

## 2022-01-31 RX ADMIN — IPRATROPIUM BROMIDE AND ALBUTEROL SULFATE 3 ML: .5; 2.5 SOLUTION RESPIRATORY (INHALATION) at 08:57

## 2022-01-31 RX ADMIN — APIXABAN 2.5 MG: 2.5 TABLET, FILM COATED ORAL at 08:02

## 2022-01-31 RX ADMIN — IPRATROPIUM BROMIDE AND ALBUTEROL SULFATE 3 ML: .5; 2.5 SOLUTION RESPIRATORY (INHALATION) at 19:45

## 2022-01-31 RX ADMIN — TAMSULOSIN HYDROCHLORIDE 0.4 MG: 0.4 CAPSULE ORAL at 08:02

## 2022-01-31 RX ADMIN — PANTOPRAZOLE SODIUM 40 MG: 40 TABLET, DELAYED RELEASE ORAL at 05:50

## 2022-01-31 RX ADMIN — SODIUM CHLORIDE, PRESERVATIVE FREE 10 ML: 5 INJECTION INTRAVENOUS at 21:12

## 2022-01-31 RX ADMIN — ISOSORBIDE MONONITRATE 30 MG: 30 TABLET, EXTENDED RELEASE ORAL at 08:02

## 2022-01-31 RX ADMIN — APIXABAN 2.5 MG: 2.5 TABLET, FILM COATED ORAL at 20:29

## 2022-01-31 RX ADMIN — BUMETANIDE 1 MG/HR: 0.25 INJECTION INTRAMUSCULAR; INTRAVENOUS at 17:39

## 2022-01-31 RX ADMIN — VALSARTAN 40 MG: 80 TABLET, FILM COATED ORAL at 20:29

## 2022-01-31 RX ADMIN — EPOETIN ALFA-EPBX 3000 UNITS: 3000 INJECTION, SOLUTION INTRAVENOUS; SUBCUTANEOUS at 08:03

## 2022-01-31 RX ADMIN — METOPROLOL SUCCINATE 50 MG: 50 TABLET, FILM COATED, EXTENDED RELEASE ORAL at 08:02

## 2022-01-31 RX ADMIN — VALSARTAN 40 MG: 80 TABLET, FILM COATED ORAL at 08:02

## 2022-01-31 ASSESSMENT — PAIN DESCRIPTION - PAIN TYPE: TYPE: ACUTE PAIN

## 2022-01-31 ASSESSMENT — PAIN SCALES - GENERAL
PAINLEVEL_OUTOF10: 3
PAINLEVEL_OUTOF10: 0
PAINLEVEL_OUTOF10: 0

## 2022-01-31 ASSESSMENT — PAIN DESCRIPTION - FREQUENCY: FREQUENCY: CONTINUOUS

## 2022-01-31 ASSESSMENT — PAIN DESCRIPTION - LOCATION: LOCATION: LEG

## 2022-01-31 ASSESSMENT — PAIN DESCRIPTION - ORIENTATION: ORIENTATION: RIGHT;LEFT

## 2022-01-31 ASSESSMENT — PAIN DESCRIPTION - DESCRIPTORS: DESCRIPTORS: CONSTANT;CRAMPING;DISCOMFORT

## 2022-01-31 NOTE — PROGRESS NOTES
Hospitalist Progress Note      SYNOPSIS: Patient admitted on 2022 for <principal problem not specified>  Mr. Rosales, a 80y.o. year old male  who  has a past medical history of CAD (coronary artery disease), CHF (congestive heart failure) ([facility] [address] Chronic atrial fibrillation ([facility] Chronic kidney disease, stage III (moderate) ([facility] [address] [facility] COPD (chronic obstructive pulmonary disease) ([facility] [address] [facility] Diabetes mellitus ([facility] [address] [facility] Essential hypertension, GERD (gastroesophageal reflux disease), H. pylori infection, Hx of degenerative disc disease, Hyperlipidemia, MI (myocardial infarction) ([facility] [address] [facility] Osteoarthritis, PFO (patent foramen ovale), Popliteal cyst, and Pulmonary hypertension ([facility] [address] [facility]    Patient presented to the emergency department with complaints of shortness of breath and swelling of the legs. Also notes weight gain. Denies fever, chills, nausea, vomiting, chest pain, abdominal pain. Shortness of breath is worse with exertion. Has been out of his Lasix for the last 2 weeks. Vital signs within normal limits and stable. Currently 98% on 2 L nasal cannula. Laboratory studies reveal BUN 38, creatinine 2.1, proBNP 6407, troponin of 50, hemoglobin 10.0. Chest x-ray shows stable cardiomegaly. Bibasilar crackles on exam.  Patient given Lasix. Medicine consulted for admission.          SUBJECTIVE:  Feels okay today, shortness of breath continues to improve. Continues to diurese  with Bumex drip. He denies any chest pain today    Stable overnight. No other overnight issues reported. Temp (24hrs), Av.9 °F (36.6 °C), Min:97.4 °F (36.3 °C), Max:98.8 °F (37.1 °C)    DIET: ADULT DIET; Regular;  Low Sodium (2 gm)  CODE: Full Code    Intake/Output Summary (Last 24 hours) at 2022 1236  Last data filed at 2022 0549  Gross per 24 hour   Intake 480 ml   Output 2400 ml   Net -1920 ml       OBJECTIVE:    /88   Pulse 82   Temp 97.4 °F (36.3 °C) (Temporal)   Resp 18   Ht 5' 9\" (1.753 m)   Wt 232 lb 9.6 oz (105.5 kg)   SpO2 92%   BMI 34.35 kg/m²     General appearance: No apparent distress, appears stated age and cooperative. HEENT:  Conjunctivae/corneas clear. Neck: Supple. No jugular venous distention. Respiratory: Clear to auscultation bilaterally, normal respiratory effort  Cardiovascular: Regular rate rhythm, normal S1-S2. Bilateral lower extremity edema with superficial wounds  Abdomen: Soft, nontender, nondistended  Musculoskeletal: No clubbing, cyanosis, no bilateral lower extremity edema. Brisk capillary refill. Neurologic: awake, alert and following commands     Assessment and plan:    #Acute on chronic diastolic CHF:  -IV Lasix switched to Bumex drip per cardiology and nephrology  Continue beta-blockers and valsartan  -2D echo EF-45 to 50%  Cardiology input appreciated     #CAD[facility]ABG at [facility]/Elevated troponin:  -Chest pain today January 28  -Nuclear stress test ordered by cardiology came back abnormal January 27  Continue beta-blocker Imdur  -Management per cardiology     #Valvular heart disease    -Hx of MVP with moderate MR and moderate PH.   -Echo EF 45 to 50%     #Chronic Afib:   -Rate controlled resume metoprolol and Eliquis    #Mild acute kidney injury  -Cardiorenal syndrome  -Improved with diuresis    #CKD stage III B  -Avoid nephrotoxic agent  -Consult nephrology while diuresing the patient input appreciated. Serum creatinine is stable at 1.8-2    #Hypertension  -Controlled     #Dyslipidemia     #Type 2 diabetes mellitus  -Had hypoglycemic episode early this morning with blood sugar in the 70s  -Hold glipizide  -Decrease Lantus to 45 unit  -A1c 6.1     #COPD without exacerbation     #Anemia of chronic kidney disease    #History of GI bleed  -Monitor hemoglobin    #DVT prophylaxis patient already on Eliquis.       DISPOSITION: To be determined, pending clinical progression    Medications:  REVIEWED DAILY    Infusion Medications    dextrose      bumetanide 0.1 mg/mL infusion 1 mg/hr (01/30/22 1750)    sodium chloride       Scheduled Medications    tamsulosin  0.4 mg Oral Daily    insulin lispro  0-6 Units SubCUTAneous TID WC    insulin lispro  0-3 Units SubCUTAneous Nightly    insulin glargine-yfgn  45 Units SubCUTAneous Nightly    epoetin roly-epbx  3,000 Units SubCUTAneous Once per day on Mon Wed Fri    apixaban  2.5 mg Oral BID    ipratropium-albuterol  1 vial Nebulization TID    isosorbide mononitrate  30 mg Oral Daily    metoprolol succinate  50 mg Oral Daily    pantoprazole  40 mg Oral QAM AC    valsartan  40 mg Oral BID    sodium chloride flush  5-40 mL IntraVENous 2 times per day     PRN Meds: glucose, dextrose, glucagon (rDNA), dextrose, aluminum & magnesium hydroxide-simethicone, technetium sestamibi, sodium chloride flush, sodium chloride, ondansetron **OR** ondansetron, polyethylene glycol, acetaminophen **OR** acetaminophen, perflutren lipid microspheres, albuterol    Labs:     Recent Labs     01/29/22  0730   WBC 7.0   HGB 9.3*   HCT 31.2*          Recent Labs     01/29/22  0730 01/30/22  0504    140   K 3.8 4.0    101   CO2 27 30*   BUN 36* 37*   CREATININE 2.0* 1.9*   CALCIUM 8.3* 8.3*       No results for input(s): PROT, ALB, ALKPHOS, ALT, AST, BILITOT, AMYLASE, LIPASE in the last 72 hours. No results for input(s): INR in the last 72 hours. No results for input(s): Sameera Cash in the last 72 hours. Chronic labs:    Lab Results   Component Value Date    CHOL 111 01/26/2022    TRIG 51 01/26/2022    HDL 36 01/26/2022    LDLCALC 65 01/26/2022    TSH 0.548 12/12/2015    INR 1.5 01/25/2022    LABA1C 6.1 (H) 01/26/2022       Radiology: REVIEWED DAILY    +++++++++++++++++++++++++++++++++++++++++++++++++  MD Linus Benson Physician - [address] [facility], [place]  +++++++++++++++++++++++++++++++++++++++++++++++++  NOTE: This report was transcribed using voice recognition software. Every effort was made to ensure accuracy; however, inadvertent computerized transcription errors may be present.

## 2022-01-31 NOTE — PROGRESS NOTES
OCCUPATIONAL THERAPY TREATMENT SESSION  [facility] [address] [address] [address]      Date:2022                                                  Patient Name: Jose Sorto  MRN: [id number]  : 1940  Room: [address] [place] [address]    Evaluating OT: ROXANNA Madrigal, OTR/L  # [id number]    Referring Provider:  Javier Martinez MD  Specific Provider Orders:  Beena Flores and Treat\"  22    Diagnosis: Heart failure ([address] [I50.9]  Leg swelling [M79.89]  Dyspnea, unspecified type [R06.00]  Acute congestive heart failure, unspecified heart failure type ([address] [I50.9]    Pt was admitted w/ SOB, LE swelling    Pertinent Medical History:  Pt has a past medical history of CAD (coronary artery disease), CHF (congestive heart failure) ([address] Chronic atrial fibrillation ([address] Chronic kidney disease, stage III (moderate) ([address] COPD (chronic obstructive pulmonary disease) ([address] Diabetes mellitus ([address] Essential hypertension, GERD (gastroesophageal reflux disease), H. pylori infection, Hx of degenerative disc disease, Hyperlipidemia, MI (myocardial infarction) ([address] Osteoarthritis, PFO (patent foramen ovale), Popliteal cyst, and Pulmonary hypertension ([address] ,  has a past surgical history that includes Mitral valve replacement and Coronary artery bypass graft.     Surgeries this admission: None     Precautions:  Fall Risk  2L O2    Assessment of current deficits   [x] Functional mobility   [x]ADLs  [x] Strength               []Cognition   [x] Functional transfers   [x] IADLs         [x] Safety Awareness   [x]Endurance   [] Fine Coordination              [x] Balance      [] Vision/perception   []Sensation    []Gross Motor Coordination  [] ROM  [] Delirium                   [] Motor Control       OT PLAN OF CARE   OT POC based on physician orders, patient diagnosis and results of clinical assessment    Frequency/Duration 1-3 days/wk for 2 weeks PRN   Specific OT Treatment to include:   * Instruction/training on adapted ADL techniques and AE recommendations to increase functional independence within precautions       * Training on energy conservation strategies, correct breathing pattern and techniques to improve independence/tolerance for self-care routine  * Functional transfer/mobility training/DME recommendations for increased independence, safety, and fall prevention  * Patient/Family education to increase follow through with safety techniques and functional independence  * Recommendation of environmental modifications for increased safety with functional transfers/mobility and ADLs  * Therapeutic exercise to improve motor endurance, ROM, and functional strength for ADLs/functional transfers  * Therapeutic activities to facilitate/challenge dynamic balance, stand tolerance for increased safety and independence with ADLs  * Therapeutic activities to facilitate gross/fine motor skills for increased independence with ADLs  * Neuro-muscular re-education: facilitation of righting/equilibrium reactions, midline orientation, scapular stability/mobility, normalization of muscle tone, and facilitation of volitional active controled movement  * Positioning to improve skin integrity, interaction with environment and functional independence  * Manual techniques for edema management  Other:    Recommended Adaptive Equipment: TBD as pt progresses - Consider Raised Commode seat, adaptive equipment      Home Living:  Pt lives with his wife in a 1-story house, No Basement. Bathroom setup:  Tub-Shower, Standard-height Commode   Equipment owned:  Shower chair    Available Family Assist:  Wife can provide some assist - unable to provide physical assist    Prior Level of Function:  Pt was IND with ADLs, IADLs, Transfers and Mobility using No AD for ambulation. Assist w/ Mickey LE wound care, donning foot-wear.     Driving:  Yes - shopping, outings, errands  Occupation:  None reported,     Pain Level:  Denied pain    Additional Complaints:  None    Cognition: A & O x 4   Able to Follow Multi-Step Commands w/ Occasional Min VCs   Memory:  good (-)   Sequencing:  good (-)   Problem solving:  good (-)   Judgement/safety:  good (-)  Additional Comments:  Pt was pleasant and cooperative.       Vitals/Lab Values: O2 sats remained > 92% for duration of session          Functional Assessment:  AM-PAC Daily Activity Raw Score: 16/24     Initial Eval Status  Date: 1/28/22   Treatment Status  Date: STGs = LTGs  Time frame: 10-14 days   Feeding IND after set up    Seated in chair    NA   Grooming SUP/Set up    Able to tolerate standing at the sink ~ 5 mins to complete tasks  VCs for improved safety awareness   NT    Pt declined Mod I  Standing at the [employer] A/Set up    [facility] after set up seated EIB  Unable to tolerate item retrieval for activities   SUP/set up    Able to don robe after set up seated EOB Mod I     LB Dressing Mod A    Max A to don socks over wounds/dressing Mickey LEs  Min A for pants - simulated in sitting/standing  Pt ed for safe/adaptive techs, use of adaptive equip   Mod A    Max A to don socks only over bandaged Feet, Able to pull pants over hips (simulated) w/ Close SUP for safety w/ standing balance  Min A     Bathing NT    Pt ed re: Benefits of use of Shower chair for safety w/ bathing, tub transfer   NT    Pt declined SUP for safety      Toileting NT    Declined NT    Pt declined Mod I     Bed Mobility  Supine to sit: NT   Sit to supine:  SUP    VCs for safety   Supine to sit: NT   Sit to supine:  SUP    VCs for safety Supine to sit: IND  Sit to supine: IND     Functional Transfers Min A    EOB, Chair 2x  Pt ed for safety/hand placement   SUP    EOB 2x  Pt ed for safety/hand placement Mod I     Functional Mobility Min A w/ [employer]    Bed<>Bathroom + Short household distance in room on room air - see vitals above  Pt ed for safety/improved safety awareness, walker safety   Close SUP w/o AD    Short distance at b/s, VCs for safety/safety awareness  Mod I     Balance Sitting:     Static:  IND in chair, Remote SUP EOB    Dynamic:  SUP w/ functional ax unsupported at EOB     Standing:     Static:  Close SUP w/ [employer]    Dynamic:  CGA w/ functional ax/mobility w/ [employer]   Sitting:     Static:  IND in chair, EOB    Dynamic:  SUP w/ functional ax unsupported at EOB     Standing:     Static:  SUP    Dynamic: Close SUP w/ functional ax/mobility w/o AD Sitting:     Static:  IND    Dynamic:  IND w/ functional ax    Standing:     Static:  Mod I w/ AD PRN    Dynamic:  Mod I w/ functional ax/mobility w/ AD PRN   Activity Tolerance Fair   Fair(+) Good(-)   Visual/  Perceptual    Hearing: WNL   Glasses: Yes    WFL   Hearing Aids:  No                 Comments: Upon arrival, patient was found seated EOB. He was agreeable to participate in therapeutic ax. No Family present during session. Received permission from RN prior to engaging pt in OT services. Educated pt on role of OT services. At the end of the session, patient was properly positioned in Semi-Supine w/ Mickey LEs elevated for edema control. Call light and phone within reach, all lines and tubes intact. Oriented pt to call bell. Made all appropriate Environmental Modifications to facilitate pt's level of IND and safety. All needs met. Overall patient demonstrated decreased independence and safety during completion of ADL/functional transfer/mobility tasks. Pt would benefit from continued skilled OT to increase safety and independence with completion of ADL/IADL tasks for functional independence and quality of life.     Treatment: OT treatment provided this date includes:    Instruction/training on safety and adapted techniques for completion of ADLs, use of DME/AD/Adaptive equip:     Instruction/training on safe functional mobility/transfer techniques, use of DME/AD:     Instruction/training on energy conservation techs (EC)/Pursed-Lip Breathing (PLB)/work simplification for completion of ADLs:      Skilled positioning/alignment for Skin Integrity, Edema Control, to maximize Pt's safety and ability to [facility] interact w/ his/her environment, maximize respiratory status   Activity tolerance - Sitting/Standing to improve endurance w/ functional ax    Cognitive retraining - Cues for safety/safety awareness, sequencing, problem solving     Skilled monitoring of Vitals during session and pt's response to tx ax      Consulted RN     Made all appropriate Environmental Modifications to facilitate pt's level of IND and safety.  Recommendations for Continued Participation in OT services during Hospitalization and at D/C     Pt and/or Family verbalized/demonstrated a Good(-) understanding of education provided. Will Review PRN.       Time In: 1320  Time Out: 1330  Total Treatment Time: 10 minutes    Min Units   OT Eval Low 98755       OT Eval Medium 40637      OT Eval High [id number]      OT Re-Eval [id number]       Therapeutic Ex [phone]       Therapeutic Activities 01110       ADL/Self Care 92117  10  1   Orthotic Management 12088       Manual 32522     Neuro Re-Ed 66921       Non-Billable Time              aDra Velasquez MOT, OTR/L  # [id number]

## 2022-01-31 NOTE — CARE COORDINATION
Patient continues on bumex gtt, plan is home when released with follow up at the [place]. His spouse is available for support. For questions I can be reached at [phone].  Rita Bernal [facility]

## 2022-01-31 NOTE — PROGRESS NOTES
[facility]  Nephrology Attending Progress Note          SUBJECTIVE:     1/26/22: The pt is an 79 yo male with a pmh of cad sp acb, dm, copd, htn, pfo,  hyperlipidemia, ckd , afib, p htn who presented with sob and swelling and weight gain. He ran out of his lasix 2 months ago. Labs show cr of 2.1, k 4.7, co2 27, bun 38, cr 2.1, ca 8.7, wbc 7.9, hgb 10.2, plt 347. Baseline cr is 1.8 from 5/2017. He has been started on iv lasix q 12. He is on diovan as an outpt. [facility].     1/27: pt seen in room, no cp or sob    1/28: Denies shortness of breath; leg edema essentially unchanged, severe    1/29: bumex drip started 1/28; no UO recorded; he has been using commode    1/30: He denies shortness of breath; he thinks his leg edema is decreasing    1/31: pt seen in room. Edema improving      PROBLEM LIST:    Patient Active Problem List   Diagnosis    Diabetes mellitus ([facility]    CAD (coronary artery disease)    COPD exacerbation ([address]    Morbid obesity due to excess calories ([address]    CHF exacerbation ([address]    Essential hypertension    Pure hypercholesterolemia    Acute on chronic diastolic congestive heart failure (HCC)    Chronic a-fib (HCC)    Chronic kidney disease, stage III (moderate) (HCC)    Heart failure (HCC)    Leg swelling          DIET:    ADULT DIET; Regular;  Low Sodium (2 gm)     PHYSICAL EXAM:     Patient Vitals for the past 24 hrs:   BP Temp Temp src Pulse Resp SpO2   01/31/22 0730 131/88 97.4 °F (36.3 °C) Temporal 82 18 92 %   01/30/22 2354 132/85 97.5 °F (36.4 °C) Oral 90 23 98 %   01/30/22 1537 114/70 98.8 °F (37.1 °C) Oral 80 18 96 %   01/30/22 1410 -- -- -- -- 16 98 %   @      Intake/Output Summary (Last 24 hours) at 1/31/2022 1109  Last data filed at 1/31/2022 0549  Gross per 24 hour   Intake 480 ml   Output 2400 ml   Net -1920 ml         Wt Readings from Last 3 Encounters:   01/28/22 232 lb 9.6 oz (105.5 kg) 02/11/20 223 lb (101.2 kg)   03/24/19 216 lb (98 kg)       Constitutional:  Pt is in no acute distress  Head: normocephalic, atraumatic  Neck: no JVD  Cardiovascular: regular rate and rhythm, no murmurs, gallops, or rubs  Respiratory:  No rales, rhochi, or wheezes  Gastrointestinal:  Soft, nontender, nondistended, bowel sounds x 4  Ext: Severe edema 3+ to both thighs  Skin: dry, no rash  Neuro: aaox3    MEDS (scheduled):    tamsulosin  0.4 mg Oral Daily    insulin lispro  0-6 Units SubCUTAneous TID WC    insulin lispro  0-3 Units SubCUTAneous Nightly    insulin glargine-yfgn  45 Units SubCUTAneous Nightly    epoetin roly-epbx  3,000 Units SubCUTAneous Once per day on Mon Wed Fri    apixaban  2.5 mg Oral BID    ipratropium-albuterol  1 vial Nebulization TID    isosorbide mononitrate  30 mg Oral Daily    metoprolol succinate  50 mg Oral Daily    pantoprazole  40 mg Oral QAM AC    valsartan  40 mg Oral BID    sodium chloride flush  5-40 mL IntraVENous 2 times per day       MEDS (infusions):   dextrose      bumetanide 0.1 mg/mL infusion 1 mg/hr (01/30/22 1750)    sodium chloride         MEDS (prn):  glucose, dextrose, glucagon (rDNA), dextrose, aluminum & magnesium hydroxide-simethicone, technetium sestamibi, sodium chloride flush, sodium chloride, ondansetron **OR** ondansetron, polyethylene glycol, acetaminophen **OR** acetaminophen, perflutren lipid microspheres, albuterol    DATA:    Recent Labs     01/29/22  0730   WBC 7.0   HGB 9.3*   HCT 31.2*   .3*        Recent Labs     01/29/22  0730 01/30/22  0504    140   K 3.8 4.0    101   CO2 27 30*   BUN 36* 37*   CREATININE 2.0* 1.9*   LABGLOM 32 34   GLUCOSE 82 104*   CALCIUM 8.3* 8.3*   MG 2.3 2.2       Lab Results   Component Value Date    LABALBU 3.5 01/25/2022    LABALBU 4.1 05/11/2017     Lab Results   Component Value Date    TSH 0.548 12/12/2015       Iron Studies  Lab Results   Component Value Date    IRON 51 (L) 01/26/2022    TIBC 271 01/26/2022    FERRITIN 89 01/27/2022     Vitamin B-12   Date Value Ref Range Status   01/27/2022 349 211 - 946 pg/mL Final     Folate   Date Value Ref Range Status   01/27/2022 11.8 4.8 - 24.2 ng/mL Final       No results found for: VITD25  PTH   Date Value Ref Range Status   01/28/2022 78 (H) 15 - 65 pg/mL Final       No components found for: URIC    No results found for: VOL, APPEARANCE, COLORU, LABSPEC, LABPH, LEUKBLD, NITRU, GLUCOSEU, KETUA, UROBILINOGEN, KETUA, UROBILINOGEN, BILIRUBINUR, OCBU    No results found for: LIPIDPAN      IMPRESSION/RECOMMENDATIONS:      1. arf on ckd 3b  Baseline cr 1.8 from 5/2017  Cr 2.1>2  In setting of decompensated heart failure  Ran out of home meds  Follow on the diovan  Cr is near baseline     2. Acute decompensated diastolic heart failure  H/o mvp mod mr and mod p htn  But response to intermittent diuretic dosing  bumex drip started 1/28  Card seeing  Awaits echo  Reluctant to have a Martinez catheter at this  Reinforced importance of measuring his urine  uo 2.4L     3. H/o cad sp acb  Awaits stress     4. HTN  Follow on diovan, toprol     5. Anemia  Dose monique if hgb <10  b12 fol fe normal        Sherwin Espino MD

## 2022-01-31 NOTE — PROGRESS NOTES
[facility] Cardiology Inpatient Progress Note    Patient is a 80 y.o. male of No primary care provider on file. seen in hospital follow up. Chief complaint: SOB/CHF    HPI: Some SOB. No CP. Patient Active Problem List   Diagnosis    Diabetes mellitus ([facility] [address]    CAD (coronary artery disease)    COPD exacerbation ([facility] [address]    Morbid obesity due to excess calories ([facility] [address]    CHF exacerbation ([facility] [address]    Essential hypertension    Pure hypercholesterolemia    Acute on chronic diastolic congestive heart failure (HCC)    Chronic a-fib (HCC)    Chronic kidney disease, stage III (moderate) (HCC)    Heart failure (HCC)    Leg swelling       Allergies   Allergen Reactions    Iodine Hives     Other reaction(s):  HIVES       Current Facility-Administered Medications   Medication Dose Route Frequency Provider Last Rate Last Admin    tamsulosin (FLOMAX) capsule 0.4 mg  0.4 mg Oral Daily Brenda Barraza MD   0.4 mg at 01/31/22 0802    insulin lispro (HUMALOG) injection vial 0-6 Units  0-6 Units SubCUTAneous TID WC Pennie Grullon MD        insulin lispro (HUMALOG) injection vial 0-3 Units  0-3 Units SubCUTAneous Nightly Pennie Grullon MD        glucose (GLUTOSE) 40 % oral gel 15 g  15 g Oral PRN Pennie Grullon MD        dextrose 50 % IV solution  12.5 g IntraVENous PRN Pennie Grullon MD        glucagon (rDNA) injection 1 mg  1 mg IntraMUSCular PRN Pennie Grullon MD        dextrose 5 % solution  100 mL/hr IntraVENous PRN Cherry Correa MD        insulin glargine-yfgn (SEMGLEE-YFGN) injection vial 45 Units  45 Units SubCUTAneous Nightly Pennie Grullon MD        aluminum & magnesium hydroxide-simethicone (MAALOX) 200-200-20 MG/5ML suspension 30 mL  30 mL Oral Q6H PRN Cherry Correa MD   30 mL at 01/28/22 1125    bumetanide (BUMEX) 12.5 mg in sodium chloride 0.9 % 125 mL infusion  1 mg/hr IntraVENous Continuous Benitez Roberto MD 10 mL/hr at 01/30/22 1750 1 mg/hr at 01/30/22 1750    technetium sestamibi (CARDIOLITE) injection 35 millicurie  35 millicurie IntraVENous ONCE PRN Eden Macias II, MD        epoetin roly-epbx (RETACRIT) injection 3,000 Units  3,000 Units SubCUTAneous Once per day on Mon Wed Fri Michelle Del Cid MD   3,000 Units at 01/31/22 0803    apixaban (ELIQUIS) tablet 2.5 mg  2.5 mg Oral BID Dory Debar, DO   2.5 mg at 01/31/22 0802    ipratropium-albuterol (Angela Deal) nebulizer solution 3 mL  1 vial Nebulization TID Dory Debar, DO   3 mL at 01/31/22 0857    isosorbide mononitrate (IMDUR) extended release tablet 30 mg  30 mg Oral Daily Dory Debar, DO   30 mg at 01/31/22 0802    metoprolol succinate (TOPROL XL) extended release tablet 50 mg  50 mg Oral Daily Dory Debar, DO   50 mg at 01/31/22 0802    pantoprazole (PROTONIX) tablet 40 mg  40 mg Oral QAM AC Dory Debar, DO   40 mg at 01/31/22 0550    valsartan (DIOVAN) tablet 40 mg  40 mg Oral BID Dory Debar, DO   40 mg at 01/31/22 3846    sodium chloride flush 0.9 % injection 5-40 mL  5-40 mL IntraVENous 2 times per day Dory Debar, DO   10 mL at 01/28/22 0859    sodium chloride flush 0.9 % injection 5-40 mL  5-40 mL IntraVENous PRN Dory Debar, DO        0.9 % sodium chloride infusion  25 mL IntraVENous PRN Dory Debar, DO        ondansetron (ZOFRAN-ODT) disintegrating tablet 4 mg  4 mg Oral Q8H PRN Dory Debar, DO        Or    ondansetron [facility]) injection 4 mg  4 mg IntraVENous Q6H PRN Dory Debar, DO        polyethylene glycol (GLYCOLAX) packet 17 g  17 g Oral Daily PRN Dory Debar, DO        acetaminophen (TYLENOL) tablet 650 mg  650 mg Oral Q6H PRN Dory Debar, DO        Or    acetaminophen (TYLENOL) suppository 650 mg  650 mg Rectal Q6H PRN Dory Debar, DO        perflutren lipid microspheres (DEFINITY) injection 1.65 mg  1.5 mL IntraVENous ONCE PRN Dory Debar, DO        albuterol (PROVENTIL) nebulizer solution 2.5 mg  2.5 mg Nebulization 4x Daily PRN Dormaikel Nagelar, DO           Review of systems:   Heart: as above   Lungs: as above   Eyes: denies changes in vision or discharge. Ears: denies changes in hearing or pain. Nose: denies epistaxis or masses   Throat: denies sore throat or trouble swallowing. Neuro: denies numbness, tingling, tremors. Skin: denies rashes or itching. : denies hematuria, dysuria   GI: denies vomiting, diarrhea   Psych: denies mood changed, anxiety, depression. Physical Exam   /88   Pulse 82   Temp 97.4 °F (36.3 °C) (Temporal)   Resp 18   Ht 5' 9\" (1.753 m)   Wt 232 lb 9.6 oz (105.5 kg)   SpO2 92%   BMI 34.35 kg/m²   Constitutional: Oriented to person, place, and time. No distress. Well developed. Head: Normocephalic and atraumatic. Neck: Neck supple. No hepatojugular reflux. No JVD present. Carotid bruit is not present. No tracheal deviation present. No thyromegaly present. Cardiovascular: Normal rate, regular rhythm, normal heart sounds. intact distal pulses. No gallop and no friction rub. No murmur heard. Pulmonary: Breath sounds normal. No respiratory distress. No wheezes. No rales. Chest: Effort normal. No tenderness. Abdominal: Soft. Bowel sounds are normal. No distension or mass. No tenderness, rebound or guarding. Musculoskeletal: . No tenderness. No clubbing or cyanosis. Extremitites: Intact distal pulses. No edema  Neurological: Alert and oriented to person, place, and time. Skin: Skin is warm and dry. No rash noted. Not diaphoretic. No erythema. Psychiatric: Normal mood and affect. Behavior is normal.   Lymphadenopathy: No cervical adenopathy. No groin adenopathy.     CBC:   Lab Results   Component Value Date    WBC 7.0 01/29/2022    RBC 3.08 01/29/2022    HGB 9.3 01/29/2022    HCT 31.2 01/29/2022    .3 01/29/2022    MCH 30.2 01/29/2022    MCHC 29.8 01/29/2022    RDW 15.4 01/29/2022     01/29/2022    MPV 8.7 01/29/2022     BMP:   Lab Results   Component Value Date     01/30/2022    K 4.0 01/30/2022    K 4.7 01/25/2022     01/30/2022    CO2 30 01/30/2022    BUN 37 01/30/2022    LABALBU 3.5 01/25/2022    CREATININE 1.9 01/30/2022    CALCIUM 8.3 01/30/2022    GFRAA 41 01/30/2022    LABGLOM 34 01/30/2022     Magnesium:    Lab Results   Component Value Date    MG 2.2 01/30/2022     Cardiac Enzymes:   Lab Results   Component Value Date    CKTOTAL 165 05/11/2017    CKTOTAL 134 05/11/2017    CKMB 1.4 05/11/2017    CKMB 1.3 05/11/2017    TROPHS 49 (H) 01/31/2022    TROPHS 47 (H) 01/30/2022    TROPHS 49 (H) 01/30/2022      PT/INR:    Lab Results   Component Value Date    PROTIME 16.5 01/25/2022    INR 1.5 01/25/2022     TSH:    Lab Results   Component Value Date    TSH 0.548 12/12/2015     Rhythm Strip: atrial fibrillation. Echocardiogram-1/29/2022:   Left ventricle size is normal.   Ejection fraction is visually estimated at 45-50%. Overall ejection fraction mildly decreased . Mild concentric left ventricular hypertrophy. Stage III diastolic dysfunction. The left atrium is severely dilated. Normal right ventricular size. Right ventricle global systolic function is moderately reduced . TAPSE 12 mm. Mild mitral annular calcification. S/p [id number] repair of the mitral valve with well seated MitraClip. Mitral valve mean gradient 5 mmHg. Mild mitral regurgitation with posteriorly directed jet. No hemodynamically significant aortic stenosis is present. BELLO by direct planimetry is 3.9 cm2. RVSP is 41 mmHg. Pulmonary hypertension is mild . No evidence for hemodynamically significant pericardial effusion. Stress test-1/25/2022: The myocardial perfusion imaging was abnormal with a large-sized,   severe intensity fixed defect of the inferior wall. There is also a   moderate-sized, moderate intensity partially reversible defect of the   anterior. Overall left ventricular systolic function was normal with wall motion   abnormality. Intermediate risk study.      ASSESSMENT & PLAN:    Patient Active Problem List   Diagnosis Diabetes mellitus ([facility] [address]    CAD (coronary artery disease)    COPD exacerbation (HCC)    Morbid obesity due to excess calories (HCC)    CHF exacerbation (HCC)    Essential hypertension    Pure hypercholesterolemia    Acute on chronic diastolic congestive heart failure (HCC)    Chronic a-fib (HCC)    Chronic kidney disease, stage III (moderate) (HCC)    Heart failure (HCC)    Leg swelling     1. Acute on chronic diastolic CHF:     Echo as above. Diurese per renal.      BB/ARB. Change IV lasix to bumex. Stop norvasc given edema issues. 2. CAD-CABG at [facility] 2007/Elevated troponin:     Medically manage. Pharm stress as above. Medically manage for now. BB/imdur/statin. No ASA due to eliquis. 3. 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 Echo as above. Observe. 4. Chronic Afib: Dose adjusted eliquis. BB.      5. CKD: Follow labs. 6. HTN: Observe. 7. Lipids: Statin. 8. DM     9. COPD     10. Anemia: Follow labs. 11. Hx of ARON Leger D.O.   Cardiologist  Cardiology, [id number]

## 2022-02-01 LAB
ALBUMIN SERPL-MCNC: 3.3 G/DL (ref 3.5–5.2)
ALP BLD-CCNC: 153 U/L (ref 40–129)
ALT SERPL-CCNC: 9 U/L (ref 0–40)
ANION GAP SERPL CALCULATED.3IONS-SCNC: 9 MMOL/L (ref 7–16)
AST SERPL-CCNC: 14 U/L (ref 0–39)
BASOPHILS ABSOLUTE: 0.04 E9/L (ref 0–0.2)
BASOPHILS RELATIVE PERCENT: 0.6 % (ref 0–2)
BILIRUB SERPL-MCNC: 1 MG/DL (ref 0–1.2)
BUN BLDV-MCNC: 38 MG/DL (ref 6–23)
CALCIUM SERPL-MCNC: 8.7 MG/DL (ref 8.6–10.2)
CHLORIDE BLD-SCNC: 95 MMOL/L (ref 98–107)
CO2: 31 MMOL/L (ref 22–29)
CREAT SERPL-MCNC: 1.8 MG/DL (ref 0.7–1.2)
EOSINOPHILS ABSOLUTE: 0.35 E9/L (ref 0.05–0.5)
EOSINOPHILS RELATIVE PERCENT: 5.1 % (ref 0–6)
GFR AFRICAN AMERICAN: 44
GFR NON-AFRICAN AMERICAN: 36 ML/MIN/1.73
GLUCOSE BLD-MCNC: 114 MG/DL (ref 74–99)
HCT VFR BLD CALC: 30.9 % (ref 37–54)
HEMOGLOBIN: 9.6 G/DL (ref 12.5–16.5)
IMMATURE GRANULOCYTES #: 0.03 E9/L
IMMATURE GRANULOCYTES %: 0.4 % (ref 0–5)
LYMPHOCYTES ABSOLUTE: 0.98 E9/L (ref 1.5–4)
LYMPHOCYTES RELATIVE PERCENT: 14.3 % (ref 20–42)
MCH RBC QN AUTO: 30.1 PG (ref 26–35)
MCHC RBC AUTO-ENTMCNC: 31.1 % (ref 32–34.5)
MCV RBC AUTO: 96.9 FL (ref 80–99.9)
METER GLUCOSE: 129 MG/DL (ref 74–99)
METER GLUCOSE: 140 MG/DL (ref 74–99)
METER GLUCOSE: 143 MG/DL (ref 74–99)
METER GLUCOSE: 154 MG/DL (ref 74–99)
MONOCYTES ABSOLUTE: 0.82 E9/L (ref 0.1–0.95)
MONOCYTES RELATIVE PERCENT: 12 % (ref 2–12)
NEUTROPHILS ABSOLUTE: 4.61 E9/L (ref 1.8–7.3)
NEUTROPHILS RELATIVE PERCENT: 67.6 % (ref 43–80)
PDW BLD-RTO: 15 FL (ref 11.5–15)
PLATELET # BLD: 316 E9/L (ref 130–450)
PMV BLD AUTO: 9 FL (ref 7–12)
POTASSIUM REFLEX MAGNESIUM: 3.7 MMOL/L (ref 3.5–5)
RBC # BLD: 3.19 E12/L (ref 3.8–5.8)
SODIUM BLD-SCNC: 135 MMOL/L (ref 132–146)
TOTAL PROTEIN: 7.2 G/DL (ref 6.4–8.3)
WBC # BLD: 6.8 E9/L (ref 4.5–11.5)

## 2022-02-01 PROCEDURE — 82962 GLUCOSE BLOOD TEST: CPT

## 2022-02-01 PROCEDURE — 2580000003 HC RX 258: Performed by: FAMILY MEDICINE

## 2022-02-01 PROCEDURE — 80053 COMPREHEN METABOLIC PANEL: CPT

## 2022-02-01 PROCEDURE — 6370000000 HC RX 637 (ALT 250 FOR IP): Performed by: FAMILY MEDICINE

## 2022-02-01 PROCEDURE — 94640 AIRWAY INHALATION TREATMENT: CPT

## 2022-02-01 PROCEDURE — 2060000000 HC ICU INTERMEDIATE R&B

## 2022-02-01 PROCEDURE — 2580000003 HC RX 258: Performed by: INTERNAL MEDICINE

## 2022-02-01 PROCEDURE — 2500000003 HC RX 250 WO HCPCS: Performed by: INTERNAL MEDICINE

## 2022-02-01 PROCEDURE — 6370000000 HC RX 637 (ALT 250 FOR IP): Performed by: INTERNAL MEDICINE

## 2022-02-01 PROCEDURE — 85025 COMPLETE CBC W/AUTO DIFF WBC: CPT

## 2022-02-01 PROCEDURE — 99233 SBSQ HOSP IP/OBS HIGH 50: CPT | Performed by: INTERNAL MEDICINE

## 2022-02-01 PROCEDURE — 36415 COLL VENOUS BLD VENIPUNCTURE: CPT

## 2022-02-01 RX ADMIN — VALSARTAN 40 MG: 80 TABLET, FILM COATED ORAL at 20:51

## 2022-02-01 RX ADMIN — METOPROLOL SUCCINATE 50 MG: 50 TABLET, FILM COATED, EXTENDED RELEASE ORAL at 09:40

## 2022-02-01 RX ADMIN — APIXABAN 2.5 MG: 2.5 TABLET, FILM COATED ORAL at 20:51

## 2022-02-01 RX ADMIN — BUMETANIDE 1 MG/HR: 0.25 INJECTION INTRAMUSCULAR; INTRAVENOUS at 20:53

## 2022-02-01 RX ADMIN — APIXABAN 2.5 MG: 2.5 TABLET, FILM COATED ORAL at 09:41

## 2022-02-01 RX ADMIN — IPRATROPIUM BROMIDE AND ALBUTEROL SULFATE 3 ML: .5; 2.5 SOLUTION RESPIRATORY (INHALATION) at 14:16

## 2022-02-01 RX ADMIN — IPRATROPIUM BROMIDE AND ALBUTEROL SULFATE 3 ML: .5; 2.5 SOLUTION RESPIRATORY (INHALATION) at 20:33

## 2022-02-01 RX ADMIN — SODIUM CHLORIDE, PRESERVATIVE FREE 10 ML: 5 INJECTION INTRAVENOUS at 20:51

## 2022-02-01 RX ADMIN — PANTOPRAZOLE SODIUM 40 MG: 40 TABLET, DELAYED RELEASE ORAL at 05:53

## 2022-02-01 RX ADMIN — TAMSULOSIN HYDROCHLORIDE 0.4 MG: 0.4 CAPSULE ORAL at 09:43

## 2022-02-01 RX ADMIN — ISOSORBIDE MONONITRATE 30 MG: 30 TABLET, EXTENDED RELEASE ORAL at 09:42

## 2022-02-01 RX ADMIN — BUMETANIDE 1 MG/HR: 0.25 INJECTION INTRAMUSCULAR; INTRAVENOUS at 06:40

## 2022-02-01 RX ADMIN — VALSARTAN 40 MG: 80 TABLET, FILM COATED ORAL at 09:39

## 2022-02-01 ASSESSMENT — PAIN SCALES - GENERAL
PAINLEVEL_OUTOF10: 0
PAINLEVEL_OUTOF10: 0

## 2022-02-01 NOTE — PROGRESS NOTES
Nutrition Assessment     Type and Reason for Visit: Initial,RD Nutrition Re-Screen/LOS (rd re-screen negative)    Nutrition Assessment:  Pt assessed per LOS protocol. Chart reviewed. Pt currently eating ~75% of most meals and w/ no other significant nutritional issues noted at this time. [facility] diet edu completed 1/29. Will follow-up per policy. Please consult if RD needed.     Electronically signed by Irma Litten, RD, MERY on 2/1/22 at 10:34 AM EST    Contact: ext [phone]

## 2022-02-01 NOTE — PLAN OF CARE
Problem: Falls - Risk of:  Goal: Will remain free from falls  Description: Will remain free from falls  Outcome: Met This Shift  Goal: Absence of physical injury  Description: Absence of physical injury  Outcome: Met This Shift     Problem: OXYGENATION/RESPIRATORY FUNCTION  Goal: Patient will maintain patent airway  Outcome: Met This Shift     Problem: HEMODYNAMIC STATUS  Goal: Patient has stable vital signs and fluid balance  Outcome: Met This Shift     Problem: Skin Integrity:  Goal: Will show no infection signs and symptoms  Description: Will show no infection signs and symptoms  Outcome: Met This Shift  Goal: Absence of new skin breakdown  Description: Absence of new skin breakdown  Outcome: Met This Shift     Problem: Pain:  Goal: Pain level will decrease  Description: Pain level will decrease  Outcome: Met This Shift  Goal: Control of acute pain  Description: Control of acute pain  Outcome: Met This Shift  Goal: Control of chronic pain  Description: Control of chronic pain  Outcome: Met This Shift

## 2022-02-01 NOTE — PROGRESS NOTES
[id number] [facility] Cardiology Inpatient Progress Note    Patient is a 80 y.o. male of No primary care provider on file. seen in hospital follow up. Chief complaint: SOB/CHF    HPI: Some SOB. No CP. Patient Active Problem List   Diagnosis    Diabetes mellitus ([facility] [address]    CAD (coronary artery disease)    COPD exacerbation ([facility] [address]    Morbid obesity due to excess calories ([facility] [address]    CHF exacerbation ([facility] [address]    Essential hypertension    Pure hypercholesterolemia    Acute on chronic diastolic congestive heart failure (HCC)    Chronic a-fib (HCC)    Chronic kidney disease, stage III (moderate) (HCC)    Heart failure (HCC)    Leg swelling       Allergies   Allergen Reactions    Iodine Hives     Other reaction(s):  HIVES       Current Facility-Administered Medications   Medication Dose Route Frequency Provider Last Rate Last Admin    tamsulosin (FLOMAX) capsule 0.4 mg  0.4 mg Oral Daily Logan Hamm MD   0.4 mg at 01/31/22 0802    insulin lispro (HUMALOG) injection vial 0-6 Units  0-6 Units SubCUTAneous TID  Pennie Hankins MD        insulin lispro (HUMALOG) injection vial 0-3 Units  0-3 Units SubCUTAneous Nightly Jada Larson MD        glucose (GLUTOSE) 40 % oral gel 15 g  15 g Oral PRN Jada Larson MD        dextrose 50 % IV solution  12.5 g IntraVENous PRN Jada Larson MD        glucagon (rDNA) injection 1 mg  1 mg IntraMUSCular PRN Jada Larson MD        dextrose 5 % solution  100 mL/hr IntraVENous PRN Jada Larson MD        insulin glargine-yfgn (SEMGLEE-YFGN) injection vial 45 Units  45 Units SubCUTAneous Nightly Jada Larson MD        aluminum & magnesium hydroxide-simethicone (MAALOX) 200-200-20 MG/5ML suspension 30 mL  30 mL Oral Q6H PRN Jada Larson MD   30 mL at 01/28/22 1125    bumetanide (BUMEX) 12.5 mg in sodium chloride 0.9 % 125 mL infusion  1 mg/hr IntraVENous Continuous Leesa Roberto MD 10 mL/hr at 02/01/22 0640 1 mg/hr at 02/01/22 0640    technetium sestamibi (CARDIOLITE) injection 35 millicurie  35 millicurie IntraVENous ONCE PRN Sona Brown MD        epoetin roly-epbx (RETACRIT) injection 3,000 Units  3,000 Units SubCUTAneous Once per day on Mon Wed Fri Trenton Hardy MD   3,000 Units at 01/31/22 3293    apixaban (ELIQUIS) tablet 2.5 mg  2.5 mg Oral BID Indiahoma Sessions, DO   2.5 mg at 01/31/22 2029    ipratropium-albuterol (Amanda Briana) nebulizer solution 3 mL  1 vial Nebulization TID Indiahoma Sessions, DO   3 mL at 01/31/22 1945    isosorbide mononitrate (IMDUR) extended release tablet 30 mg  30 mg Oral Daily Indiahoma Sessions, DO   30 mg at 01/31/22 0802    metoprolol succinate (TOPROL XL) extended release tablet 50 mg  50 mg Oral Daily Indiahoma Sessions, DO   50 mg at 01/31/22 0802    pantoprazole (PROTONIX) tablet 40 mg  40 mg Oral QAM AC Indiahoma Sessions, DO   40 mg at 02/01/22 0553    valsartan (DIOVAN) tablet 40 mg  40 mg Oral BID Indiahoma Sessions, DO   40 mg at 01/31/22 2029    sodium chloride flush 0.9 % injection 5-40 mL  5-40 mL IntraVENous 2 times per day Indiahoma Sessions, DO   10 mL at 01/31/22 2112    sodium chloride flush 0.9 % injection 5-40 mL  5-40 mL IntraVENous PRN Indiahoma Sessions, DO        0.9 % sodium chloride infusion  25 mL IntraVENous PRN Indiahoma Sessions, DO        ondansetron (ZOFRAN-ODT) disintegrating tablet 4 mg  4 mg Oral Q8H PRN Indiahoma Sessions, DO        Or    ondansetron [facility]) injection 4 mg  4 mg IntraVENous Q6H PRN Indiahoma Sessions, DO        polyethylene glycol (GLYCOLAX) packet 17 g  17 g Oral Daily PRN Indiahoma Sessions, DO        acetaminophen (TYLENOL) tablet 650 mg  650 mg Oral Q6H PRN Indiahoma Sessions, DO        Or    acetaminophen (TYLENOL) suppository 650 mg  650 mg Rectal Q6H PRN Indiahoma Sessions, DO        perflutren lipid microspheres (DEFINITY) injection 1.65 mg  1.5 mL IntraVENous ONCE PRN Indiahoma Sessions, DO        albuterol (PROVENTIL) nebulizer solution 2.5 mg  2.5 mg Nebulization 4x Daily PRN Indiahoma Sessions, DO           Review of systems:   Heart: as above   Lungs: as above   Eyes: denies changes in vision or discharge. Ears: denies changes in hearing or pain. Nose: denies epistaxis or masses   Throat: denies sore throat or trouble swallowing. Neuro: denies numbness, tingling, tremors. Skin: denies rashes or itching. : denies hematuria, dysuria   GI: denies vomiting, diarrhea   Psych: denies mood changed, anxiety, depression. Physical Exam   /74   Pulse 84   Temp 98.1 °F (36.7 °C) (Oral)   Resp 18   Ht 5' 9\" (1.753 m)   Wt 232 lb 9.6 oz (105.5 kg)   SpO2 90%   BMI 34.35 kg/m²   Constitutional: Oriented to person, place, and time. No distress. Well developed. Head: Normocephalic and atraumatic. Neck: Neck supple. No hepatojugular reflux. No JVD present. Carotid bruit is not present. No tracheal deviation present. No thyromegaly present. Cardiovascular: Normal rate, regular rhythm, normal heart sounds. intact distal pulses. No gallop and no friction rub. No murmur heard. Pulmonary: Breath sounds normal. No respiratory distress. No wheezes. No rales. Chest: Effort normal. No tenderness. Abdominal: Soft. Bowel sounds are normal. No distension or mass. No tenderness, rebound or guarding. Musculoskeletal: . No tenderness. No clubbing or cyanosis. Extremitites: Intact distal pulses. No edema  Neurological: Alert and oriented to person, place, and time. Skin: Skin is warm and dry. No rash noted. Not diaphoretic. No erythema. Psychiatric: Normal mood and affect. Behavior is normal.   Lymphadenopathy: No cervical adenopathy. No groin adenopathy.     CBC:   Lab Results   Component Value Date    WBC 6.8 02/01/2022    RBC 3.19 02/01/2022    HGB 9.6 02/01/2022    HCT 30.9 02/01/2022    MCV 96.9 02/01/2022    MCH 30.1 02/01/2022    MCHC 31.1 02/01/2022    RDW 15.0 02/01/2022     02/01/2022    MPV 9.0 02/01/2022     BMP:   Lab Results   Component Value Date     02/01/2022    K 3.7 02/01/2022    CL 95 02/01/2022    CO2 31 02/01/2022    BUN 38 02/01/2022    LABALBU 3.3 02/01/2022    CREATININE 1.8 02/01/2022    CALCIUM 8.7 02/01/2022    GFRAA 44 02/01/2022    LABGLOM 36 02/01/2022     Magnesium:    Lab Results   Component Value Date    MG 2.2 01/30/2022     Cardiac Enzymes:   Lab Results   Component Value Date    CKTOTAL 165 05/11/2017    CKTOTAL 134 05/11/2017    CKMB 1.4 05/11/2017    CKMB 1.3 05/11/2017    TROPHS 47 (H) 01/31/2022    TROPHS 49 (H) 01/31/2022    TROPHS 47 (H) 01/30/2022      PT/INR:    Lab Results   Component Value Date    PROTIME 16.5 01/25/2022    INR 1.5 01/25/2022     TSH:    Lab Results   Component Value Date    TSH 0.548 12/12/2015     Rhythm Strip: atrial fibrillation. Echocardiogram-1/29/2022:   Left ventricle size is normal.   Ejection fraction is visually estimated at 45-50%. Overall ejection fraction mildly decreased . Mild concentric left ventricular hypertrophy. Stage III diastolic dysfunction. The left atrium is severely dilated. Normal right ventricular size. Right ventricle global systolic function is moderately reduced . TAPSE 12 mm. Mild mitral annular calcification. S/p [id number]-[id number] repair of the mitral valve with well seated MitraClip. Mitral valve mean gradient 5 mmHg. Mild mitral regurgitation with posteriorly directed jet. No hemodynamically significant aortic stenosis is present. BELLO by direct planimetry is 3.9 cm2. RVSP is 41 mmHg. Pulmonary hypertension is mild . No evidence for hemodynamically significant pericardial effusion. Stress test-1/25/2022: The myocardial perfusion imaging was abnormal with a large-sized,   severe intensity fixed defect of the inferior wall. There is also a   moderate-sized, moderate intensity partially reversible defect of the   anterior. Overall left ventricular systolic function was normal with wall motion   abnormality. Intermediate risk study.      ASSESSMENT & PLAN:    Patient Active Problem List   Diagnosis  Diabetes mellitus ([facility] [address]    CAD (coronary artery disease)    COPD exacerbation ([facility] [address]    Morbid obesity due to excess calories ([facility] [address]    CHF exacerbation ([facility] [address]    Essential hypertension    Pure hypercholesterolemia    Acute on chronic diastolic congestive heart failure (HCC)    Chronic a-fib (HCC)    Chronic kidney disease, stage III (moderate) (HCC)    Heart failure (HCC)    Leg swelling     1. Acute on chronic diastolic CHF:     Echo as above. Diurese per renal.      BB/ARB. Change IV lasix to bumex. Stop norvasc given edema issues. 2. CAD-CABG at [facility] 2007/Elevated troponin:     Medically manage. Pharm stress as above. Medically manage for now. BB/imdur/statin. No ASA due to eliquis. 3. 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 Echo as above. Observe. 4. Chronic Afib: Dose adjusted eliquis. BB.      5. CKD: Follow labs. 6. HTN: Observe. 7. Lipids: Statin. 8. DM     9. COPD     10. Anemia: Follow labs. 11. Hx of ARON Olivares D.O.   Cardiologist  Cardiology, [facility]

## 2022-02-01 NOTE — PROGRESS NOTES
Hospitalist Progress Note      SYNOPSIS: Patient admitted on 2022 for <principal problem not specified>  Mr. Jennifer Real, a 80y.o. year old male  who  has a past medical history of CAD (coronary artery disease), CHF (congestive heart failure) ([facility] [address] Chronic atrial fibrillation ([facility] [address] Chronic kidney disease, stage III (moderate) ([facility] [address] COPD (chronic obstructive pulmonary disease) ([facility] [address] Diabetes mellitus ([facility] [address] Essential hypertension, GERD (gastroesophageal reflux disease), H. pylori infection, Hx of degenerative disc disease, Hyperlipidemia, MI (myocardial infarction) ([facility] [address] Osteoarthritis, PFO (patent foramen ovale), Popliteal cyst, and Pulmonary hypertension ([facility] [address]    Patient presented to the emergency department with complaints of shortness of breath and swelling of the legs. Also notes weight gain. Denies fever, chills, nausea, vomiting, chest pain, abdominal pain. Shortness of breath is worse with exertion. Has been out of his Lasix for the last 2 weeks. Vital signs within normal limits and stable. Currently 98% on 2 L nasal cannula. Laboratory studies reveal BUN 38, creatinine 2.1, proBNP 6407, troponin of 50, hemoglobin 10.0. Chest x-ray shows stable cardiomegaly. Bibasilar crackles on exam.  Patient given Lasix. Medicine consulted for admission.          SUBJECTIVE:  Feels okay today, no shortness of breath at this time. Continues to diurese with Bumex drip per cardiology  He denies any chest pain today    Stable overnight. No other overnight issues reported. Temp (24hrs), Av.7 °F (36.5 °C), Min:97 °F (36.1 °C), Max:98.1 °F (36.7 °C)    DIET: ADULT DIET; Regular;  Low Sodium (2 gm)  CODE: Full Code    Intake/Output Summary (Last 24 hours) at 2022 0912  Last data filed at 2022 0801  Gross per 24 hour   Intake 600 ml   Output 3925 ml   Net -3325 ml       OBJECTIVE:    /74   Pulse 84   Temp 98.1 °F (36.7 °C) (Oral)   Resp 18   Ht 5' 9\" (1.753 m)   Wt 232 lb 9.6 oz (105.5 kg)   SpO2 90%   BMI 34.35 kg/m²     General appearance: No apparent distress, appears stated age and cooperative. HEENT:  Conjunctivae/corneas clear. Neck: Supple. No jugular venous distention. Respiratory: Clear to auscultation bilaterally, normal respiratory effort  Cardiovascular: Regular rate rhythm, normal S1-S2. Bilateral lower extremity edema with superficial wounds  Abdomen: Soft, nontender, nondistended  Musculoskeletal: No clubbing, cyanosis, no bilateral lower extremity edema. Some posterior thigh edema. Brisk capillary refill. Neurologic: awake, alert and following commands     Assessment and plan:    #Acute on chronic diastolic CHF:  -IV  Bumex drip per cardiology and nephrology  Continue beta-blockers and valsartan  -2D echo EF-45 to 50%  Cardiology input appreciated     #CAD-CABG at [facility] 2007/Elevated troponin:  -Chest pain today January 28  -Nuclear stress test ordered by cardiology came back abnormal January 27  Continue beta-blocker Imdur  -Management per cardiology     #Valvular heart disease    -Hx of MVP with moderate MR and moderate PH.   -Echo EF 45 to 50%     #Chronic Afib:   -Rate controlled resume metoprolol and Eliquis    #Mild acute kidney injury  -Cardiorenal syndrome  -Improved with diuresis    #CKD stage III B  -Avoid nephrotoxic agent  -Consult nephrology while diuresing the patient input appreciated. Serum creatinine is stable at 1.8-2    #Hypertension  -Controlled     #Dyslipidemia     #Type 2 diabetes mellitus  -Held glipizide and Decreased Lantus to 45 unit due to hypoglycemia  -A1c 6.1  - currently stable      #COPD without exacerbation     #Anemia of chronic kidney disease    #History of GI bleed  -Monitor hemoglobin, currently at 9.6    #DVT prophylaxis patient already on Eliquis.       DISPOSITION: currently on IV diuresis- home after    Medications:  REVIEWED DAILY    Infusion Medications    dextrose      bumetanide 0.1 mg/mL infusion 1 mg/hr (02/01/22 9369)    sodium chloride       Scheduled Medications    tamsulosin  0.4 mg Oral Daily    insulin lispro  0-6 Units SubCUTAneous TID WC    insulin lispro  0-3 Units SubCUTAneous Nightly    insulin glargine-yfgn  45 Units SubCUTAneous Nightly    epoetin roly-epbx  3,000 Units SubCUTAneous Once per day on Mon Wed Fri    apixaban  2.5 mg Oral BID    ipratropium-albuterol  1 vial Nebulization TID    isosorbide mononitrate  30 mg Oral Daily    metoprolol succinate  50 mg Oral Daily    pantoprazole  40 mg Oral QAM AC    valsartan  40 mg Oral BID    sodium chloride flush  5-40 mL IntraVENous 2 times per day     PRN Meds: glucose, dextrose, glucagon (rDNA), dextrose, aluminum & magnesium hydroxide-simethicone, technetium sestamibi, sodium chloride flush, sodium chloride, ondansetron **OR** ondansetron, polyethylene glycol, acetaminophen **OR** acetaminophen, perflutren lipid microspheres, albuterol    Labs:     Recent Labs     02/01/22  0449   WBC 6.8   HGB 9.6*   HCT 30.9*          Recent Labs     01/30/22  0504 02/01/22 0449    135   K 4.0 3.7    95*   CO2 30* 31*   BUN 37* 38*   CREATININE 1.9* 1.8*   CALCIUM 8.3* 8.7       Recent Labs     02/01/22  0449   PROT 7.2   ALKPHOS 153*   ALT 9   AST 14   BILITOT 1.0       No results for input(s): INR in the last 72 hours. No results for input(s): Barbara Peek in the last 72 hours. Chronic labs:    Lab Results   Component Value Date    CHOL 111 01/26/2022    TRIG 51 01/26/2022    HDL 36 01/26/2022    LDLCALC 65 01/26/2022    TSH 0.548 12/12/2015    INR 1.5 01/25/2022    LABA1C 6.1 (H) 01/26/2022       Radiology: REVIEWED DAILY    +++++++++++++++++++++++++++++++++++++++++++++++++  Ambika Lowery MD  [employer] - Hospitalist  [address], [place]  +++++++++++++++++++++++++++++++++++++++++++++++++  NOTE: This report was transcribed using voice recognition software.  Every effort was made to ensure accuracy; however, inadvertent computerized transcription errors may be present.

## 2022-02-01 NOTE — PROGRESS NOTES
[facility]  Nephrology Attending Progress Note          SUBJECTIVE:     1/26/22: The pt is an 81 yo male with a pmh of cad sp acb, dm, copd, htn, pfo,  hyperlipidemia, ckd , afib, p htn who presented with sob and swelling and weight gain. He ran out of his lasix 2 months ago. Labs show cr of 2.1, k 4.7, co2 27, bun 38, cr 2.1, ca 8.7, wbc 7.9, hgb 10.2, plt 347. Baseline cr is 1.8 from 5/2017. He has been started on iv lasix q 12. He is on diovan as an outpt. [facility].     1/27: pt seen in room, no cp or sob    1/28: Denies shortness of breath; leg edema essentially unchanged, severe    1/29: bumex drip started 1/28; no UO recorded; he has been using commode    1/30: He denies shortness of breath; he thinks his leg edema is decreasing    1/31: pt seen in room. Edema improving    2/1: pt seen in room, feels better, eating ok, no sob      PROBLEM LIST:    Patient Active Problem List   Diagnosis    Diabetes mellitus ([facility] [address]    CAD (coronary artery disease)    COPD exacerbation ([address]    Morbid obesity due to excess calories ([address]    CHF exacerbation ([facility] [address]    Essential hypertension    Pure hypercholesterolemia    Acute on chronic diastolic congestive heart failure (HCC)    Chronic a-fib (HCC)    Chronic kidney disease, stage III (moderate) (HCC)    Heart failure (HCC)    Leg swelling          DIET:    ADULT DIET; Regular;  Low Sodium (2 gm)     PHYSICAL EXAM:     Patient Vitals for the past 24 hrs:   BP Temp Temp src Pulse Resp SpO2   02/01/22 0730 125/74 98.1 °F (36.7 °C) Oral 84 18 90 %   01/31/22 2026 (!) 127/90 98.1 °F (36.7 °C) Oral 95 20 91 %   01/31/22 1315 132/84 97 °F (36.1 °C) Temporal 80 18 94 %   @      Intake/Output Summary (Last 24 hours) at 2/1/2022 1125  Last data filed at 2/1/2022 1019  Gross per 24 hour   Intake 600 ml   Output 4525 ml   Net -3925 ml         Wt Readings from Last 3 Encounters:   01/28/22 232 lb 9.6 oz (105.5 kg)   02/11/20 223 lb (101.2 kg)   03/24/19 216 lb (98 kg)       Constitutional:  Pt is in no acute distress  Head: normocephalic, atraumatic  Neck: no JVD  Cardiovascular: regular rate and rhythm, no murmurs, gallops, or rubs  Respiratory:  No rales, rhochi, or wheezes  Gastrointestinal:  Soft, nontender, nondistended, bowel sounds x 4  Ext: Severe edema 3+ to both thighs  Skin: dry, no rash  Neuro: aaox3    MEDS (scheduled):    tamsulosin  0.4 mg Oral Daily    insulin lispro  0-6 Units SubCUTAneous TID WC    insulin lispro  0-3 Units SubCUTAneous Nightly    insulin glargine-yfgn  45 Units SubCUTAneous Nightly    epoetin roly-epbx  3,000 Units SubCUTAneous Once per day on Mon Wed Fri    apixaban  2.5 mg Oral BID    ipratropium-albuterol  1 vial Nebulization TID    isosorbide mononitrate  30 mg Oral Daily    metoprolol succinate  50 mg Oral Daily    pantoprazole  40 mg Oral QAM AC    valsartan  40 mg Oral BID    sodium chloride flush  5-40 mL IntraVENous 2 times per day       MEDS (infusions):   dextrose      bumetanide 0.1 mg/mL infusion 1 mg/hr (02/01/22 0640)    sodium chloride         MEDS (prn):  glucose, dextrose, glucagon (rDNA), dextrose, aluminum & magnesium hydroxide-simethicone, technetium sestamibi, sodium chloride flush, sodium chloride, ondansetron **OR** ondansetron, polyethylene glycol, acetaminophen **OR** acetaminophen, perflutren lipid microspheres, albuterol    DATA:    Recent Labs     02/01/22  0449   WBC 6.8   HGB 9.6*   HCT 30.9*   MCV 96.9        Recent Labs     01/30/22  0504 02/01/22  0449    135   K 4.0 3.7    95*   CO2 30* 31*   BUN 37* 38*   CREATININE 1.9* 1.8*   LABGLOM 34 36   GLUCOSE 104* 114*   CALCIUM 8.3* 8.7   ALT  --  9   AST  --  14   BILITOT  --  1.0   ALKPHOS  --  153*   MG 2.2  --        Lab Results   Component Value Date    LABALBU 3.3 (L) 02/01/2022    LABALBU 3.5 01/25/2022    LABALBU 4.1 05/11/2017     Lab Results   Component Value Date    TSH 0.548 12/12/2015       Iron Studies  Lab Results   Component Value Date    IRON 51 (L) 01/26/2022    TIBC 271 01/26/2022    FERRITIN 89 01/27/2022     Vitamin B-12   Date Value Ref Range Status   01/27/2022 349 211 - 946 pg/mL Final     Folate   Date Value Ref Range Status   01/27/2022 11.8 4.8 - 24.2 ng/mL Final       No results found for: VITD25  PTH   Date Value Ref Range Status   01/28/2022 78 (H) 15 - 65 pg/mL Final       No components found for: URIC    No results found for: VOL, APPEARANCE, COLORU, LABSPEC, LABPH, LEUKBLD, NITRU, GLUCOSEU, KETUA, UROBILINOGEN, KETUA, UROBILINOGEN, BILIRUBINUR, OCBU    No results found for: LIPIDPAN      IMPRESSION/RECOMMENDATIONS:      1. arf on ckd 3b  Baseline cr 1.8 from 5/2017  Cr 2.1>2>1.8  In setting of decompensated heart failure  Ran out of home meds  Follow on the diovan  Cr is near baseline     2. Acute decompensated diastolic heart failure  H/o mvp mod mr and mod p htn  But response to intermittent diuretic dosing  bumex drip started 1/28  Card seeing  Awaits echo  Reluctant to have a Martinez catheter at this  Reinforced importance of measuring his urine  uo 2. L  -3.9L     3. H/o cad sp acb  Awaits stress     4. HTN  Follow on diovan, toprol     5. Anemia  Dose monique if hgb <10  b12 fol fe normal        Diogenes Everett MD

## 2022-02-02 LAB
ALBUMIN SERPL-MCNC: 3.4 G/DL (ref 3.5–5.2)
ALP BLD-CCNC: 148 U/L (ref 40–129)
ALT SERPL-CCNC: 7 U/L (ref 0–40)
ANION GAP SERPL CALCULATED.3IONS-SCNC: 10 MMOL/L (ref 7–16)
AST SERPL-CCNC: 13 U/L (ref 0–39)
BASOPHILS ABSOLUTE: 0.03 E9/L (ref 0–0.2)
BASOPHILS RELATIVE PERCENT: 0.5 % (ref 0–2)
BILIRUB SERPL-MCNC: 1.1 MG/DL (ref 0–1.2)
BUN BLDV-MCNC: 40 MG/DL (ref 6–23)
CALCIUM SERPL-MCNC: 8.8 MG/DL (ref 8.6–10.2)
CHLORIDE BLD-SCNC: 92 MMOL/L (ref 98–107)
CO2: 33 MMOL/L (ref 22–29)
CREAT SERPL-MCNC: 1.9 MG/DL (ref 0.7–1.2)
EOSINOPHILS ABSOLUTE: 0.31 E9/L (ref 0.05–0.5)
EOSINOPHILS RELATIVE PERCENT: 4.8 % (ref 0–6)
GFR AFRICAN AMERICAN: 41
GFR NON-AFRICAN AMERICAN: 34 ML/MIN/1.73
GLUCOSE BLD-MCNC: 121 MG/DL (ref 74–99)
HCT VFR BLD CALC: 32.4 % (ref 37–54)
HEMOGLOBIN: 9.9 G/DL (ref 12.5–16.5)
IMMATURE GRANULOCYTES #: 0.02 E9/L
IMMATURE GRANULOCYTES %: 0.3 % (ref 0–5)
LYMPHOCYTES ABSOLUTE: 0.91 E9/L (ref 1.5–4)
LYMPHOCYTES RELATIVE PERCENT: 14 % (ref 20–42)
MAGNESIUM: 2.1 MG/DL (ref 1.6–2.6)
MCH RBC QN AUTO: 29.6 PG (ref 26–35)
MCHC RBC AUTO-ENTMCNC: 30.6 % (ref 32–34.5)
MCV RBC AUTO: 97 FL (ref 80–99.9)
METER GLUCOSE: 137 MG/DL (ref 74–99)
METER GLUCOSE: 157 MG/DL (ref 74–99)
MONOCYTES ABSOLUTE: 0.83 E9/L (ref 0.1–0.95)
MONOCYTES RELATIVE PERCENT: 12.7 % (ref 2–12)
NEUTROPHILS ABSOLUTE: 4.42 E9/L (ref 1.8–7.3)
NEUTROPHILS RELATIVE PERCENT: 67.7 % (ref 43–80)
PDW BLD-RTO: 15.1 FL (ref 11.5–15)
PLATELET # BLD: 324 E9/L (ref 130–450)
PMV BLD AUTO: 8.9 FL (ref 7–12)
POTASSIUM REFLEX MAGNESIUM: 3.4 MMOL/L (ref 3.5–5)
RBC # BLD: 3.34 E12/L (ref 3.8–5.8)
SODIUM BLD-SCNC: 135 MMOL/L (ref 132–146)
TOTAL PROTEIN: 7.3 G/DL (ref 6.4–8.3)
WBC # BLD: 6.5 E9/L (ref 4.5–11.5)

## 2022-02-02 PROCEDURE — 2060000000 HC ICU INTERMEDIATE R&B

## 2022-02-02 PROCEDURE — 94640 AIRWAY INHALATION TREATMENT: CPT

## 2022-02-02 PROCEDURE — 83735 ASSAY OF MAGNESIUM: CPT

## 2022-02-02 PROCEDURE — 6370000000 HC RX 637 (ALT 250 FOR IP): Performed by: INTERNAL MEDICINE

## 2022-02-02 PROCEDURE — 99233 SBSQ HOSP IP/OBS HIGH 50: CPT | Performed by: INTERNAL MEDICINE

## 2022-02-02 PROCEDURE — 2500000003 HC RX 250 WO HCPCS: Performed by: INTERNAL MEDICINE

## 2022-02-02 PROCEDURE — 6370000000 HC RX 637 (ALT 250 FOR IP): Performed by: FAMILY MEDICINE

## 2022-02-02 PROCEDURE — 85025 COMPLETE CBC W/AUTO DIFF WBC: CPT

## 2022-02-02 PROCEDURE — 80053 COMPREHEN METABOLIC PANEL: CPT

## 2022-02-02 PROCEDURE — 36415 COLL VENOUS BLD VENIPUNCTURE: CPT

## 2022-02-02 PROCEDURE — 2580000003 HC RX 258: Performed by: INTERNAL MEDICINE

## 2022-02-02 PROCEDURE — 82962 GLUCOSE BLOOD TEST: CPT

## 2022-02-02 PROCEDURE — 6360000002 HC RX W HCPCS: Performed by: INTERNAL MEDICINE

## 2022-02-02 RX ORDER — POTASSIUM CHLORIDE 20 MEQ/1
20 TABLET, EXTENDED RELEASE ORAL ONCE
Status: COMPLETED | OUTPATIENT
Start: 2022-02-02 | End: 2022-02-02

## 2022-02-02 RX ADMIN — METOPROLOL SUCCINATE 50 MG: 50 TABLET, FILM COATED, EXTENDED RELEASE ORAL at 09:36

## 2022-02-02 RX ADMIN — APIXABAN 2.5 MG: 2.5 TABLET, FILM COATED ORAL at 09:36

## 2022-02-02 RX ADMIN — BUMETANIDE 1 MG/HR: 0.25 INJECTION INTRAMUSCULAR; INTRAVENOUS at 08:12

## 2022-02-02 RX ADMIN — ACETAMINOPHEN 650 MG: 325 TABLET ORAL at 21:04

## 2022-02-02 RX ADMIN — IPRATROPIUM BROMIDE AND ALBUTEROL SULFATE 3 ML: .5; 2.5 SOLUTION RESPIRATORY (INHALATION) at 09:21

## 2022-02-02 RX ADMIN — PANTOPRAZOLE SODIUM 40 MG: 40 TABLET, DELAYED RELEASE ORAL at 05:35

## 2022-02-02 RX ADMIN — IPRATROPIUM BROMIDE AND ALBUTEROL SULFATE 3 ML: .5; 2.5 SOLUTION RESPIRATORY (INHALATION) at 13:30

## 2022-02-02 RX ADMIN — EPOETIN ALFA-EPBX 3000 UNITS: 3000 INJECTION, SOLUTION INTRAVENOUS; SUBCUTANEOUS at 09:36

## 2022-02-02 RX ADMIN — VALSARTAN 40 MG: 80 TABLET, FILM COATED ORAL at 21:00

## 2022-02-02 RX ADMIN — BUMETANIDE 1 MG/HR: 0.25 INJECTION INTRAMUSCULAR; INTRAVENOUS at 17:57

## 2022-02-02 RX ADMIN — ISOSORBIDE MONONITRATE 30 MG: 30 TABLET, EXTENDED RELEASE ORAL at 09:36

## 2022-02-02 RX ADMIN — VALSARTAN 40 MG: 80 TABLET, FILM COATED ORAL at 09:36

## 2022-02-02 RX ADMIN — APIXABAN 2.5 MG: 2.5 TABLET, FILM COATED ORAL at 21:00

## 2022-02-02 RX ADMIN — IPRATROPIUM BROMIDE AND ALBUTEROL SULFATE 3 ML: .5; 2.5 SOLUTION RESPIRATORY (INHALATION) at 19:50

## 2022-02-02 RX ADMIN — TAMSULOSIN HYDROCHLORIDE 0.4 MG: 0.4 CAPSULE ORAL at 09:36

## 2022-02-02 RX ADMIN — POTASSIUM CHLORIDE 20 MEQ: 1500 TABLET, EXTENDED RELEASE ORAL at 21:00

## 2022-02-02 ASSESSMENT — PAIN DESCRIPTION - FREQUENCY
FREQUENCY: INTERMITTENT
FREQUENCY: INTERMITTENT

## 2022-02-02 ASSESSMENT — PAIN DESCRIPTION - DESCRIPTORS
DESCRIPTORS: HEADACHE
DESCRIPTORS: HEADACHE

## 2022-02-02 ASSESSMENT — PAIN SCALES - GENERAL
PAINLEVEL_OUTOF10: 0
PAINLEVEL_OUTOF10: 2
PAINLEVEL_OUTOF10: 6
PAINLEVEL_OUTOF10: 0
PAINLEVEL_OUTOF10: 0

## 2022-02-02 ASSESSMENT — PAIN DESCRIPTION - PAIN TYPE: TYPE: ACUTE PAIN

## 2022-02-02 ASSESSMENT — PAIN DESCRIPTION - LOCATION: LOCATION: HEAD

## 2022-02-02 NOTE — PROGRESS NOTES
[facility]  Nephrology Attending Progress Note          SUBJECTIVE:     1/26/22: The pt is an 79 yo male with a pmh of cad sp acb, dm, copd, htn, pfo,  hyperlipidemia, ckd , afib, p htn who presented with sob and swelling and weight gain. He ran out of his lasix 2 months ago. Labs show cr of 2.1, k 4.7, co2 27, bun 38, cr 2.1, ca 8.7, wbc 7.9, hgb 10.2, plt 347. Baseline cr is 1.8 from 5/2017. He has been started on iv lasix q 12. He is on diovan as an outpt. [facility]rologist.     1/27: pt seen in room, no cp or sob    1/28: Denies shortness of breath; leg edema essentially unchanged, severe    1/29: bumex drip started 1/28; no UO recorded; he has been using commode    1/30: He denies shortness of breath; he thinks his leg edema is decreasing    1/31: pt seen in room. Edema improving    2/1: pt seen in room, feels better, eating ok, no sob    2/2: pt seen in room ,on bumex drip. PROBLEM LIST:    Patient Active Problem List   Diagnosis    Diabetes mellitus ([facility] [address]    CAD (coronary artery disease)    COPD exacerbation ([facility] [address]    Morbid obesity due to excess calories ([facility] [address]    CHF exacerbation ([facility] [address]    Essential hypertension    Pure hypercholesterolemia    Acute on chronic diastolic congestive heart failure (HCC)    Chronic a-fib (HCC)    Chronic kidney disease, stage III (moderate) (HCC)    Heart failure (HCC)    Leg swelling          DIET:    ADULT DIET; Regular;  Low Sodium (2 gm)     PHYSICAL EXAM:     Patient Vitals for the past 24 hrs:   BP Temp Temp src Pulse Resp SpO2   02/02/22 0921 -- -- -- -- 17 93 %   02/02/22 0817 134/77 97.9 °F (36.6 °C) Oral 80 20 95 %   02/01/22 1515 122/73 98.1 °F (36.7 °C) Oral 81 18 94 %   @      Intake/Output Summary (Last 24 hours) at 2/2/2022 1126  Last data filed at 2/2/2022 0817  Gross per 24 hour   Intake 240 ml   Output 3800 ml   Net -3560 ml         Wt Readings from Last 3 Encounters:   01/28/22 232 lb 9.6 oz (105.5 kg)   02/11/20 223 lb (101.2 kg)   03/24/19 216 lb (98 kg)       Constitutional:  Pt is in no acute distress  Head: normocephalic, atraumatic  Neck: no JVD  Cardiovascular: regular rate and rhythm, no murmurs, gallops, or rubs  Respiratory:  No rales, rhochi, or wheezes  Gastrointestinal:  Soft, nontender, nondistended, bowel sounds x 4  Ext: edema decreasing  Skin: dry, no rash  Neuro: aaox3    MEDS (scheduled):    tamsulosin  0.4 mg Oral Daily    insulin lispro  0-6 Units SubCUTAneous TID WC    insulin lispro  0-3 Units SubCUTAneous Nightly    insulin glargine-yfgn  45 Units SubCUTAneous Nightly    epoetin roly-epbx  3,000 Units SubCUTAneous Once per day on Mon Wed Fri    apixaban  2.5 mg Oral BID    ipratropium-albuterol  1 vial Nebulization TID    isosorbide mononitrate  30 mg Oral Daily    metoprolol succinate  50 mg Oral Daily    pantoprazole  40 mg Oral QAM AC    valsartan  40 mg Oral BID    sodium chloride flush  5-40 mL IntraVENous 2 times per day       MEDS (infusions):   dextrose      bumetanide 0.1 mg/mL infusion 1 mg/hr (02/02/22 0812)    sodium chloride         MEDS (prn):  glucose, dextrose, glucagon (rDNA), dextrose, aluminum & magnesium hydroxide-simethicone, technetium sestamibi, sodium chloride flush, sodium chloride, ondansetron **OR** ondansetron, polyethylene glycol, acetaminophen **OR** acetaminophen, perflutren lipid microspheres, albuterol    DATA:    Recent Labs     02/01/22  0449 02/02/22 0453   WBC 6.8 6.5   HGB 9.6* 9.9*   HCT 30.9* 32.4*   MCV 96.9 97.0    324     Recent Labs     02/01/22  0449 02/02/22 0453    135   K 3.7 3.4*   CL 95* 92*   CO2 31* 33*   BUN 38* 40*   CREATININE 1.8* 1.9*   LABGLOM 36 34   GLUCOSE 114* 121*   CALCIUM 8.7 8.8   ALT 9 7   AST 14 13   BILITOT 1.0 1.1   ALKPHOS 153* 148*   MG  --  2.1       Lab Results   Component Value Date    LABALBU 3.4 (L) 02/02/2022    LABALBU 3.3 (L) 02/01/2022    LABALBU 3.5 01/25/2022     Lab Results   Component Value Date    TSH 0.548 12/12/2015       Iron Studies  Lab Results   Component Value Date    IRON 51 (L) 01/26/2022    TIBC 271 01/26/2022    FERRITIN 89 01/27/2022     Vitamin B-12   Date Value Ref Range Status   01/27/2022 349 211 - 946 pg/mL Final     Folate   Date Value Ref Range Status   01/27/2022 11.8 4.8 - 24.2 ng/mL Final       No results found for: VITD25  PTH   Date Value Ref Range Status   01/28/2022 78 (H) 15 - 65 pg/mL Final       No components found for: URIC    No results found for: VOL, APPEARANCE, COLORU, LABSPEC, LABPH, LEUKBLD, NITRU, GLUCOSEU, KETUA, UROBILINOGEN, KETUA, UROBILINOGEN, BILIRUBINUR, OCBU    No results found for: LIPIDPAN      IMPRESSION/RECOMMENDATIONS:      1. arf on ckd 3b  Baseline cr 1.8 from 5/2017  Cr 2.1>2>1.8  In setting of decompensated heart failure  Ran out of home meds  Follow on the diovan  Cr is near baseline     2. Acute decompensated diastolic heart failure  H/o mvp mod mr and mod p htn  But response to intermittent diuretic dosing  bumex drip started 1/28  Card seeing  Reluctant to have a Martinez catheter at this  Reinforced importance of measuring his urine  uo -5.7L  -8.9L overall  Wt 245>232 lbs  Will change to po in am     3. H/o cad sp acb  Awaits stress     4. HTN  Follow on diovan, toprol     5. Anemia  Dose monique if hgb <10  b12 fol fe normal        Ashley Ward MD

## 2022-02-02 NOTE — PROGRESS NOTES
Hospitalist Progress Note      SYNOPSIS: Patient admitted on 2022 for <principal problem not specified>  Mr. Chiquita Kelley, a 80y.o. year old male  who  has a past medical history of CAD (coronary artery disease), CHF (congestive heart failure) ([facility] [address] Chronic atrial fibrillation ([facility] [address] Chronic kidney disease, stage III (moderate) ([facility] [address] COPD (chronic obstructive pulmonary disease) ([facility] [address] Diabetes mellitus ([facility] [address] Essential hypertension, GERD (gastroesophageal reflux disease), H. pylori infection, Hx of degenerative disc disease, Hyperlipidemia, MI (myocardial infarction) ([facility] [address] Osteoarthritis, PFO (patent foramen ovale), Popliteal cyst, and Pulmonary hypertension ([facility] [address]    Patient presented to the emergency department with complaints of shortness of breath and swelling of the legs. Also notes weight gain. Denies fever, chills, nausea, vomiting, chest pain, abdominal pain. Shortness of breath is worse with exertion. Has been out of his Lasix for the last 2 weeks. Vital signs within normal limits and stable. Currently 98% on 2 L nasal cannula. Laboratory studies reveal BUN 38, creatinine 2.1, proBNP 6407, troponin of 50, hemoglobin 10.0. Chest x-ray shows stable cardiomegaly. Bibasilar crackles on exam.  Patient given Lasix. Medicine consulted for admission.          SUBJECTIVE:  Feels okay today, no shortness of breath at this time. Continues to diurese with Bumex drip per cardiology  He denies any chest pain today    Stable overnight. No other overnight issues reported. Temp (24hrs), Av °F (36.7 °C), Min:97.9 °F (36.6 °C), Max:98.1 °F (36.7 °C)    DIET: ADULT DIET; Regular;  Low Sodium (2 gm)  CODE: Full Code    Intake/Output Summary (Last 24 hours) at 2022 1023  Last data filed at 2022 0817  Gross per 24 hour   Intake 240 ml   Output 3800 ml   Net -3560 ml       OBJECTIVE:    /77   Pulse 80   Temp 97.9 °F (36.6 °C) (Oral)   Resp 17   Ht 5' 9\" (1.753 m)   Wt 232 lb 9.6 oz (105.5 kg)   SpO2 93%   BMI 34.35 kg/m²     General appearance: No apparent distress, appears stated age and cooperative. HEENT:  Conjunctivae/corneas clear. Neck: Supple. No jugular venous distention. Respiratory: Clear to auscultation bilaterally, normal respiratory effort  Cardiovascular: Regular rate rhythm, normal S1-S2. Bilateral lower extremity edema with superficial wounds  Abdomen: Soft, nontender, nondistended  Musculoskeletal: No clubbing, cyanosis, no bilateral lower extremity edema. Some posterior thigh edema b/l. Brisk capillary refill. Neurologic: awake, alert and following commands     Assessment and plan:    #Acute on chronic diastolic CHF:  -IV  Bumex drip per cardiology and nephrology, Good urine output  Continue beta-blockers and valsartan  -2D echo EF-45 to 50%  Cardiology input appreciated     #CAD-CABG at [facility] 2007/Elevated troponin:  -Chest pain today January 28  -Nuclear stress test ordered by cardiology came back abnormal January 27  Continue beta-blocker Imdur  -Management per cardiology     #Valvular heart disease    -Hx of MVP with moderate MR and moderate PH.   -Echo EF 45 to 50%     #Chronic Afib:   -Rate controlled resume metoprolol and Eliquis    #Mild acute kidney injury  -Cardiorenal syndrome  -Improved with diuresis    #CKD stage III B  -Avoid nephrotoxic agent  -Consult nephrology while diuresing the patient input appreciated. Serum creatinine is stable at 1.8-2    #Hypertension  -Controlled     #Dyslipidemia     #Type 2 diabetes mellitus  -Held glipizide and Decreased Lantus to 45 unit due to hypoglycemia  -A1c 6.1  - currently stable      #COPD without exacerbation     #Anemia of chronic kidney disease    #History of GI bleed  -Monitor hemoglobin, currently at 9.6    #DVT prophylaxis patient already on Eliquis.       DISPOSITION: currently on IV diuresis- home after    Medications:  REVIEWED DAILY    Infusion Medications    dextrose      bumetanide 0.1 mg/mL infusion 1 mg/hr (02/02/22 3842)    sodium chloride       Scheduled Medications    tamsulosin  0.4 mg Oral Daily    insulin lispro  0-6 Units SubCUTAneous TID WC    insulin lispro  0-3 Units SubCUTAneous Nightly    insulin glargine-yfgn  45 Units SubCUTAneous Nightly    epoetin roly-epbx  3,000 Units SubCUTAneous Once per day on Mon Wed Fri    apixaban  2.5 mg Oral BID    ipratropium-albuterol  1 vial Nebulization TID    isosorbide mononitrate  30 mg Oral Daily    metoprolol succinate  50 mg Oral Daily    pantoprazole  40 mg Oral QAM AC    valsartan  40 mg Oral BID    sodium chloride flush  5-40 mL IntraVENous 2 times per day     PRN Meds: glucose, dextrose, glucagon (rDNA), dextrose, aluminum & magnesium hydroxide-simethicone, technetium sestamibi, sodium chloride flush, sodium chloride, ondansetron **OR** ondansetron, polyethylene glycol, acetaminophen **OR** acetaminophen, perflutren lipid microspheres, albuterol    Labs:     Recent Labs     02/01/22 0449 02/02/22 0453   WBC 6.8 6.5   HGB 9.6* 9.9*   HCT 30.9* 32.4*    324       Recent Labs     02/01/22 0449 02/02/22 0453    135   K 3.7 3.4*   CL 95* 92*   CO2 31* 33*   BUN 38* 40*   CREATININE 1.8* 1.9*   CALCIUM 8.7 8.8       Recent Labs     02/01/22 0449 02/02/22 0453   PROT 7.2 7.3   ALKPHOS 153* 148*   ALT 9 7   AST 14 13   BILITOT 1.0 1.1       No results for input(s): INR in the last 72 hours. No results for input(s): [employer] in the last 72 hours.     Chronic labs:    Lab Results   Component Value Date    CHOL 111 01/26/2022    TRIG 51 01/26/2022    HDL 36 01/26/2022    LDLCALC 65 01/26/2022    TSH 0.548 12/12/2015    INR 1.5 01/25/2022    LABA1C 6.1 (H) 01/26/2022       Radiology: REVIEWED DAILY    +++++++++++++++++++++++++++++++++++++++++++++++++  Mary Ventura MD  [facility] Physician - Hospitalist  [address] [facility] [address], [place]  +++++++++++++++++++++++++++++++++++++++++++++++++  NOTE: This report was transcribed using voice recognition software. Every effort was made to ensure accuracy; however, inadvertent computerized transcription errors may be present.

## 2022-02-02 NOTE — PROGRESS NOTES
[facility] Cardiology Inpatient Progress Note    Patient is a 80 y.o. male of No primary care provider on file. seen in hospital follow up. Chief complaint: SOB/CHF    HPI: Some SOB. No CP. Patient Active Problem List   Diagnosis    [facility]    CAD (coronary artery disease)    COPD exacerbation ([facility]    Morbid obesity due to excess calories ([facility]    CHF exacerbation ([facility]    Essential hypertension    Pure hypercholesterolemia    Acute on chronic diastolic congestive heart failure (HCC)    Chronic a-fib (HCC)    Chronic kidney disease, stage III (moderate) (HCC)    Heart failure (HCC)    Leg swelling       Allergies   Allergen Reactions    Iodine Hives     Other reaction(s):  HIVES       Current Facility-Administered Medications   Medication Dose Route Frequency Provider Last Rate Last Admin    tamsulosin (FLOMAX) capsule 0.4 mg  0.4 mg Oral Daily Karen Romo MD   0.4 mg at 02/02/22 0936    insulin lispro (HUMALOG) injection vial 0-6 Units  0-6 Units SubCUTAneous TID  Pennie Ruiz MD        insulin lispro (HUMALOG) injection vial 0-3 Units  0-3 Units SubCUTAneous Nightly Dar Biswas MD        glucose (GLUTOSE) 40 % oral gel 15 g  15 g Oral PRN Dar Biswas MD        dextrose 50 % IV solution  12.5 g IntraVENous PRN Dar Biswas MD        glucagon (rDNA) injection 1 mg  1 mg IntraMUSCular PRN Dar Biswas MD        dextrose 5 % solution  100 mL/hr IntraVENous PRGIFTY Biswas MD        insulin glargine-yfgn (SEMGLEE-YFGN) injection vial 45 Units  45 Units SubCUTAneous Nightly Dar Biswas MD        aluminum & magnesium hydroxide-simethicone (MAALOX) 200-200-20 MG/5ML suspension 30 mL  30 mL Oral Q6H PRN Dar Biswas MD   30 mL at 01/28/22 1125    bumetanide (BUMEX) 12.5 mg in sodium chloride 0.9 % 125 mL infusion  1 mg/hr IntraVENous Continuous Bryan Roberto MD 10 mL/hr at 02/02/22 6926 1 mg/hr at 02/02/22 3132    technetium sestamibi (CARDIOLITE) injection 35 millicurie  35 millicurie IntraVENous ONCE PRN Tomasita Aschoff, MD        epoetin roly-epbx (RETACRIT) injection 3,000 Units  3,000 Units SubCUTAneous Once per day on Mon Wed Fri Emanuel Reza MD   3,000 Units at 02/02/22 3488    apixaban (ELIQUIS) tablet 2.5 mg  2.5 mg Oral BID Valerie Peeling, DO   2.5 mg at 02/02/22 8629    ipratropium-albuterol (DUONEB) nebulizer solution 3 mL  1 vial Nebulization TID Valerie Peeling, DO   3 mL at 02/02/22 8903    isosorbide mononitrate (IMDUR) extended release tablet 30 mg  30 mg Oral Daily Valerie Peeling, DO   30 mg at 02/02/22 6686    metoprolol succinate (TOPROL XL) extended release tablet 50 mg  50 mg Oral Daily Valerie Peeling, DO   50 mg at 02/02/22 0936    pantoprazole (PROTONIX) tablet 40 mg  40 mg Oral QAM AC Valerie Peeling, DO   40 mg at 02/02/22 0535    valsartan (DIOVAN) tablet 40 mg  40 mg Oral BID Valerie Peeling, DO   40 mg at 02/02/22 1745    sodium chloride flush 0.9 % injection 5-40 mL  5-40 mL IntraVENous 2 times per day Valerie Peeling, DO   10 mL at 02/01/22 2051    sodium chloride flush 0.9 % injection 5-40 mL  5-40 mL IntraVENous PRN Valerie Peeling, DO        0.9 % sodium chloride infusion  25 mL IntraVENous PRN Valerie Peeling, DO        ondansetron (ZOFRAN-ODT) disintegrating tablet 4 mg  4 mg Oral Q8H PRN Valerie Peeling, DO        Or    ondansetron [facility]) injection 4 mg  4 mg IntraVENous Q6H PRN Valerie Peeling, DO        polyethylene glycol (GLYCOLAX) packet 17 g  17 g Oral Daily PRN Valerie Peeling, DO        acetaminophen (TYLENOL) tablet 650 mg  650 mg Oral Q6H PRN Valerie Peeling, DO        Or    acetaminophen (TYLENOL) suppository 650 mg  650 mg Rectal Q6H PRN Valerie Peeling, DO        perflutren lipid microspheres (DEFINITY) injection 1.65 mg  1.5 mL IntraVENous ONCE PRN Valerie Peeling, DO        albuterol (PROVENTIL) nebulizer solution 2.5 mg  2.5 mg Nebulization 4x Daily PRN Valerie Peeling, DO           Review of systems:   Heart: as above   Lungs: as above   Eyes: denies changes in vision or discharge. Ears: denies changes in hearing or pain. Nose: denies epistaxis or masses   Throat: denies sore throat or trouble swallowing. Neuro: denies numbness, tingling, tremors. Skin: denies rashes or itching. : denies hematuria, dysuria   GI: denies vomiting, diarrhea   Psych: denies mood changed, anxiety, depression. Physical Exam   /77   Pulse 80   Temp 97.9 °F (36.6 °C) (Oral)   Resp 17   Ht 5' 9\" (1.753 m)   Wt 232 lb 9.6 oz (105.5 kg)   SpO2 93%   BMI 34.35 kg/m²   Constitutional: Oriented to person, place, and time. No distress. Well developed. Head: Normocephalic and atraumatic. Neck: Neck supple. No hepatojugular reflux. No JVD present. Carotid bruit is not present. No tracheal deviation present. No thyromegaly present. Cardiovascular: Normal rate, regular rhythm, normal heart sounds. intact distal pulses. No gallop and no friction rub. No murmur heard. Pulmonary: Breath sounds normal. No respiratory distress. No wheezes. No rales. Chest: Effort normal. No tenderness. Abdominal: Soft. Bowel sounds are normal. No distension or mass. No tenderness, rebound or guarding. Musculoskeletal: . No tenderness. No clubbing or cyanosis. Extremitites: Intact distal pulses. No edema  Neurological: Alert and oriented to person, place, and time. Skin: Skin is warm and dry. No rash noted. Not diaphoretic. No erythema. Psychiatric: Normal mood and affect. Behavior is normal.   Lymphadenopathy: No cervical adenopathy. No groin adenopathy.     CBC:   Lab Results   Component Value Date    WBC 6.5 02/02/2022    RBC 3.34 02/02/2022    HGB 9.9 02/02/2022    HCT 32.4 02/02/2022    MCV 97.0 02/02/2022    MCH 29.6 02/02/2022    MCHC 30.6 02/02/2022    RDW 15.1 02/02/2022     02/02/2022    MPV 8.9 02/02/2022     BMP:   Lab Results   Component Value Date     02/02/2022    K 3.4 02/02/2022    CL 92 02/02/2022    CO2 33 02/02/2022    BUN 40 02/02/2022    LABALBU 3.4 02/02/2022    CREATININE 1.9 02/02/2022    CALCIUM 8.8 02/02/2022    GFRAA 41 02/02/2022    LABGLOM 34 02/02/2022     Magnesium:    Lab Results   Component Value Date    MG 2.1 02/02/2022     Cardiac Enzymes:   Lab Results   Component Value Date    CKTOTAL 165 05/11/2017    CKTOTAL 134 05/11/2017    CKMB 1.4 05/11/2017    CKMB 1.3 05/11/2017    TROPHS 47 (H) 01/31/2022    TROPHS 49 (H) 01/31/2022    TROPHS 47 (H) 01/30/2022      PT/INR:    Lab Results   Component Value Date    PROTIME 16.5 01/25/2022    INR 1.5 01/25/2022     TSH:    Lab Results   Component Value Date    TSH 0.548 12/12/2015     Rhythm Strip: atrial fibrillation. Echocardiogram-1/29/2022:   Left ventricle size is normal.   Ejection fraction is visually estimated at 45-50%. Overall ejection fraction mildly decreased . Mild concentric left ventricular hypertrophy. Stage III diastolic dysfunction. The left atrium is severely dilated. Normal right ventricular size. Right ventricle global systolic function is moderately reduced . TAPSE 12 mm. Mild mitral annular calcification. S/p Ihlr-sv-Mliw repair of the mitral valve with well seated MitraClip. Mitral valve mean gradient 5 mmHg. Mild mitral regurgitation with posteriorly directed jet. No hemodynamically significant aortic stenosis is present. BELLO by direct planimetry is 3.9 cm2. RVSP is 41 mmHg. Pulmonary hypertension is mild . No evidence for hemodynamically significant pericardial effusion. Stress test-1/25/2022: The myocardial perfusion imaging was abnormal with a large-sized,   severe intensity fixed defect of the inferior wall. There is also a   moderate-sized, moderate intensity partially reversible defect of the   anterior. Overall left ventricular systolic function was normal with wall motion   abnormality. Intermediate risk study.      ASSESSMENT & PLAN:    Patient Active Problem List   Diagnosis  Diabetes mellitus ([facility]    CAD (coronary artery disease)    COPD exacerbation ([facility]    Morbid obesity due to excess calories ([facility]    CHF exacerbation ([facility]    Essential hypertension    Pure hypercholesterolemia    Acute on chronic diastolic congestive heart failure (HCC)    Chronic a-fib (HCC)    Chronic kidney disease, stage III (moderate) (HCC)    Heart failure (HCC)    Leg swelling     1. Acute on chronic diastolic CHF:     Echo as above. Diurese per renal.      BB/ARB. Diuresing. 2. CAD-CABG at [facility] 2007/Elevated troponin:     Medically manage. Pharm stress as above. Medically manage for now. BB/imdur/statin. No ASA due to eliquis. 3. 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 Echo as above. Observe. 4. Chronic Afib: Dose adjusted eliquis. BB.      5. CKD: Follow labs. 6. HTN: Observe. 7. Lipids: Statin. 8. DM     9. COPD     10. Anemia: Follow labs. 11. Hx of ARON Medley D.O.   Cardiologist  Cardiology, [facility]

## 2022-02-02 NOTE — CARE COORDINATION
Bumex gtt continues. Plan is home with spouse when released. Message left with [facility] to confirm they will not require any additional clinicals to help facility med fill when released. For questions I can be reached at [phone].  Kris Craig [facility]

## 2022-02-03 LAB
ALBUMIN SERPL-MCNC: 3.6 G/DL (ref 3.5–5.2)
ALP BLD-CCNC: 145 U/L (ref 40–129)
ALT SERPL-CCNC: 7 U/L (ref 0–40)
ANION GAP SERPL CALCULATED.3IONS-SCNC: 12 MMOL/L (ref 7–16)
AST SERPL-CCNC: 12 U/L (ref 0–39)
BASOPHILS ABSOLUTE: 0.03 E9/L (ref 0–0.2)
BASOPHILS RELATIVE PERCENT: 0.5 % (ref 0–2)
BILIRUB SERPL-MCNC: 1.4 MG/DL (ref 0–1.2)
BUN BLDV-MCNC: 40 MG/DL (ref 6–23)
CALCIUM SERPL-MCNC: 9.2 MG/DL (ref 8.6–10.2)
CHLORIDE BLD-SCNC: 94 MMOL/L (ref 98–107)
CO2: 32 MMOL/L (ref 22–29)
CREAT SERPL-MCNC: 2 MG/DL (ref 0.7–1.2)
EOSINOPHILS ABSOLUTE: 0.43 E9/L (ref 0.05–0.5)
EOSINOPHILS RELATIVE PERCENT: 6.6 % (ref 0–6)
GFR AFRICAN AMERICAN: 39
GFR NON-AFRICAN AMERICAN: 32 ML/MIN/1.73
GLUCOSE BLD-MCNC: 122 MG/DL (ref 74–99)
HCT VFR BLD CALC: 32.6 % (ref 37–54)
HEMOGLOBIN: 10.1 G/DL (ref 12.5–16.5)
IMMATURE GRANULOCYTES #: 0.03 E9/L
IMMATURE GRANULOCYTES %: 0.5 % (ref 0–5)
LYMPHOCYTES ABSOLUTE: 0.91 E9/L (ref 1.5–4)
LYMPHOCYTES RELATIVE PERCENT: 13.9 % (ref 20–42)
MCH RBC QN AUTO: 30.3 PG (ref 26–35)
MCHC RBC AUTO-ENTMCNC: 31 % (ref 32–34.5)
MCV RBC AUTO: 97.9 FL (ref 80–99.9)
METER GLUCOSE: 108 MG/DL (ref 74–99)
METER GLUCOSE: 125 MG/DL (ref 74–99)
METER GLUCOSE: 214 MG/DL (ref 74–99)
MONOCYTES ABSOLUTE: 0.89 E9/L (ref 0.1–0.95)
MONOCYTES RELATIVE PERCENT: 13.6 % (ref 2–12)
NEUTROPHILS ABSOLUTE: 4.27 E9/L (ref 1.8–7.3)
NEUTROPHILS RELATIVE PERCENT: 64.9 % (ref 43–80)
PDW BLD-RTO: 15.1 FL (ref 11.5–15)
PLATELET # BLD: 341 E9/L (ref 130–450)
PMV BLD AUTO: 8.8 FL (ref 7–12)
POTASSIUM REFLEX MAGNESIUM: 3.6 MMOL/L (ref 3.5–5)
PRO-BNP: ABNORMAL PG/ML (ref 0–450)
RBC # BLD: 3.33 E12/L (ref 3.8–5.8)
SODIUM BLD-SCNC: 138 MMOL/L (ref 132–146)
TOTAL PROTEIN: 7.6 G/DL (ref 6.4–8.3)
WBC # BLD: 6.6 E9/L (ref 4.5–11.5)

## 2022-02-03 PROCEDURE — 6370000000 HC RX 637 (ALT 250 FOR IP): Performed by: INTERNAL MEDICINE

## 2022-02-03 PROCEDURE — 82962 GLUCOSE BLOOD TEST: CPT

## 2022-02-03 PROCEDURE — 99233 SBSQ HOSP IP/OBS HIGH 50: CPT | Performed by: INTERNAL MEDICINE

## 2022-02-03 PROCEDURE — 6370000000 HC RX 637 (ALT 250 FOR IP): Performed by: FAMILY MEDICINE

## 2022-02-03 PROCEDURE — 85025 COMPLETE CBC W/AUTO DIFF WBC: CPT

## 2022-02-03 PROCEDURE — 36415 COLL VENOUS BLD VENIPUNCTURE: CPT

## 2022-02-03 PROCEDURE — 80053 COMPREHEN METABOLIC PANEL: CPT

## 2022-02-03 PROCEDURE — 83880 ASSAY OF NATRIURETIC PEPTIDE: CPT

## 2022-02-03 PROCEDURE — 2060000000 HC ICU INTERMEDIATE R&B

## 2022-02-03 PROCEDURE — 2500000003 HC RX 250 WO HCPCS: Performed by: INTERNAL MEDICINE

## 2022-02-03 PROCEDURE — 2580000003 HC RX 258: Performed by: INTERNAL MEDICINE

## 2022-02-03 PROCEDURE — 94640 AIRWAY INHALATION TREATMENT: CPT

## 2022-02-03 RX ADMIN — APIXABAN 2.5 MG: 2.5 TABLET, FILM COATED ORAL at 08:33

## 2022-02-03 RX ADMIN — IPRATROPIUM BROMIDE AND ALBUTEROL SULFATE 3 ML: .5; 2.5 SOLUTION RESPIRATORY (INHALATION) at 12:57

## 2022-02-03 RX ADMIN — BUMETANIDE 1 MG/HR: 0.25 INJECTION INTRAMUSCULAR; INTRAVENOUS at 17:12

## 2022-02-03 RX ADMIN — BUMETANIDE 1 MG/HR: 0.25 INJECTION INTRAMUSCULAR; INTRAVENOUS at 05:49

## 2022-02-03 RX ADMIN — VALSARTAN 40 MG: 80 TABLET, FILM COATED ORAL at 08:33

## 2022-02-03 RX ADMIN — VALSARTAN 40 MG: 80 TABLET, FILM COATED ORAL at 20:24

## 2022-02-03 RX ADMIN — PANTOPRAZOLE SODIUM 40 MG: 40 TABLET, DELAYED RELEASE ORAL at 05:50

## 2022-02-03 RX ADMIN — METOPROLOL SUCCINATE 50 MG: 50 TABLET, FILM COATED, EXTENDED RELEASE ORAL at 08:33

## 2022-02-03 RX ADMIN — TAMSULOSIN HYDROCHLORIDE 0.4 MG: 0.4 CAPSULE ORAL at 08:33

## 2022-02-03 RX ADMIN — IPRATROPIUM BROMIDE AND ALBUTEROL SULFATE 3 ML: .5; 2.5 SOLUTION RESPIRATORY (INHALATION) at 08:16

## 2022-02-03 RX ADMIN — IPRATROPIUM BROMIDE AND ALBUTEROL SULFATE 3 ML: .5; 2.5 SOLUTION RESPIRATORY (INHALATION) at 19:19

## 2022-02-03 RX ADMIN — ISOSORBIDE MONONITRATE 30 MG: 30 TABLET, EXTENDED RELEASE ORAL at 08:33

## 2022-02-03 RX ADMIN — APIXABAN 2.5 MG: 2.5 TABLET, FILM COATED ORAL at 20:24

## 2022-02-03 RX ADMIN — POLYETHYLENE GLYCOL 3350 17 G: 17 POWDER, FOR SOLUTION ORAL at 15:15

## 2022-02-03 ASSESSMENT — PAIN SCALES - GENERAL
PAINLEVEL_OUTOF10: 0
PAINLEVEL_OUTOF10: 0

## 2022-02-03 ASSESSMENT — PAIN SCALES - WONG BAKER: WONGBAKER_NUMERICALRESPONSE: 0

## 2022-02-03 NOTE — PROGRESS NOTES
Hospitalist Progress Note      SYNOPSIS: Patient admitted on 2022 for <principal problem not specified>  Mr. Kecai Ng, a 80y.o. year old male  who  has a past medical history of CAD (coronary artery disease), CHF (congestive heart failure) ([facility] Chronic atrial fibrillation ([facility] Chronic kidney disease, stage III (moderate) ([facility] COPD (chronic obstructive pulmonary disease) ([facility] Diabetes mellitus ([facility] Essential hypertension, GERD (gastroesophageal reflux disease), H. pylori infection, Hx of degenerative disc disease, Hyperlipidemia, MI (myocardial infarction) ([facility] Osteoarthritis, PFO (patent foramen ovale), Popliteal cyst, and Pulmonary hypertension ([facility]    Patient presented to the emergency department with complaints of shortness of breath and swelling of the legs. Also notes weight gain. Denies fever, chills, nausea, vomiting, chest pain, abdominal pain. Shortness of breath is worse with exertion. Has been out of his Lasix for the last 2 weeks. Vital signs within normal limits and stable. Currently 98% on 2 L nasal cannula. Laboratory studies reveal BUN 38, creatinine 2.1, proBNP 6407, troponin of 50, hemoglobin 10.0. Chest x-ray shows stable cardiomegaly. Bibasilar crackles on exam.  Patient given Lasix. Medicine consulted for admission.          SUBJECTIVE:  Feels okay today, no shortness of breath at this time. Continues to diurese with Bumex drip per cardiology  He denies any chest pain today    Stable overnight. No other overnight issues reported. Temp (24hrs), Av.9 °F (36.6 °C), Min:97.8 °F (36.6 °C), Max:98 °F (36.7 °C)    DIET: ADULT DIET; Regular;  Low Sodium (2 gm)  CODE: Full Code    Intake/Output Summary (Last 24 hours) at 2/3/2022 1009  Last data filed at 2/3/2022 0742  Gross per 24 hour   Intake 360 ml   Output 2850 ml   Net -2490 ml       OBJECTIVE:    /73   Pulse 88   Temp 97.9 °F (36.6 °C) (Oral)   Resp 20   Ht 5' 9\" (1.753 m)   Wt 232 lb 9.6 oz (105.5 kg)   SpO2 95%   BMI 34.35 kg/m²     General appearance: No apparent distress, appears stated age and cooperative. HEENT:  Conjunctivae/corneas clear. Neck: Supple. No jugular venous distention. Respiratory: Clear to auscultation bilaterally, normal respiratory effort  Cardiovascular: Regular rate rhythm, normal S1-S2. Bilateral lower extremity edema with superficial wounds  Abdomen: Soft, nontender, nondistended  Musculoskeletal: No clubbing, cyanosis, no bilateral lower extremity edema. Some posterior thigh edema b/l. Brisk capillary refill. Neurologic: awake, alert and following commands     Assessment and plan:    #Acute on chronic diastolic CHF:  -IV  Bumex drip per cardiology and nephrology, Good urine output  -BNP ordered today-10,291  Continue beta-blockers and valsartan  -2D echo EF-45 to 50%  Cardiology input appreciated     #CAD-CABG at [facility] 2007/Elevated troponin:  -Nuclear stress test ordered by cardiology came back abnormal January 27  Continue beta-blocker Imdur  -Management per cardiology     #Valvular heart disease    -Hx of MVP with moderate MR and moderate PH.   -Echo EF 45 to 50%     #Chronic Afib:   -Rate controlled resume metoprolol and Eliquis    #Mild acute kidney injury  -Cardiorenal syndrome  -Improved with diuresis    #CKD stage III B  -Avoid nephrotoxic agent  -Consult nephrology while diuresing the patient input appreciated. Serum creatinine is stable at 1.8-2    #Hypertension  -Controlled     #Dyslipidemia     #Type 2 diabetes mellitus  -Held glipizide and Decreased Lantus to 45 unit due to hypoglycemia  -A1c 6.1  - currently stable      #COPD without exacerbation     #Anemia of chronic kidney disease    #History of GI bleed  -Monitor hemoglobin, currently at 10.1    #DVT prophylaxis patient already on Eliquis.       DISPOSITION: currently on IV diuresis- home after    Medications:  REVIEWED DAILY    Infusion Medications    dextrose      bumetanide 0.1 mg/mL infusion 1 mg/hr (02/03/22 0549)    sodium chloride       Scheduled Medications    tamsulosin  0.4 mg Oral Daily    insulin lispro  0-6 Units SubCUTAneous TID WC    insulin lispro  0-3 Units SubCUTAneous Nightly    insulin glargine-yfgn  45 Units SubCUTAneous Nightly    epoetin roly-epbx  3,000 Units SubCUTAneous Once per day on Mon Wed Fri    apixaban  2.5 mg Oral BID    ipratropium-albuterol  1 vial Nebulization TID    isosorbide mononitrate  30 mg Oral Daily    metoprolol succinate  50 mg Oral Daily    pantoprazole  40 mg Oral QAM AC    valsartan  40 mg Oral BID    sodium chloride flush  5-40 mL IntraVENous 2 times per day     PRN Meds: glucose, dextrose, glucagon (rDNA), dextrose, aluminum & magnesium hydroxide-simethicone, technetium sestamibi, sodium chloride flush, sodium chloride, ondansetron **OR** ondansetron, polyethylene glycol, acetaminophen **OR** acetaminophen, perflutren lipid microspheres, albuterol    Labs:     Recent Labs     02/01/22 0449 02/02/22 0453 02/03/22 0447   WBC 6.8 6.5 6.6   HGB 9.6* 9.9* 10.1*   HCT 30.9* 32.4* 32.6*    324 341       Recent Labs     02/01/22 0449 02/02/22 0453 02/03/22 0447    135 138   K 3.7 3.4* 3.6   CL 95* 92* 94*   CO2 31* 33* 32*   BUN 38* 40* 40*   CREATININE 1.8* 1.9* 2.0*   CALCIUM 8.7 8.8 9.2       Recent Labs     02/01/22 0449 02/02/22 0453 02/03/22 0447   PROT 7.2 7.3 7.6   ALKPHOS 153* 148* 145*   ALT 9 7 7   AST 14 13 12   BILITOT 1.0 1.1 1.4*       No results for input(s): INR in the last 72 hours. No results for input(s): Santana Puma in the last 72 hours.     Chronic labs:    Lab Results   Component Value Date    CHOL 111 01/26/2022    TRIG 51 01/26/2022    HDL 36 01/26/2022    LDLCALC 65 01/26/2022    TSH 0.548 12/12/2015    INR 1.5 01/25/2022    LABA1C 6.1 (H) 01/26/2022       Radiology: REVIEWED DAILY    +++++++++++++++++++++++++++++++++++++++++++++++++  Tulio Lucia MD  [facility] [place] Physician - [address], [place]  +++++++++++++++++++++++++++++++++++++++++++++++++  NOTE: This report was transcribed using voice recognition software. Every effort was made to ensure accuracy; however, inadvertent computerized transcription errors may be present.

## 2022-02-03 NOTE — PROGRESS NOTES
[facility]  Nephrology Attending Progress Note          SUBJECTIVE:     1/26/22: The pt is an 81 yo male with a pmh of cad sp acb, dm, copd, htn, pfo,  hyperlipidemia, ckd , afib, p htn who presented with sob and swelling and weight gain. He ran out of his lasix 2 months ago. Labs show cr of 2.1, k 4.7, co2 27, bun 38, cr 2.1, ca 8.7, wbc 7.9, hgb 10.2, plt 347. Baseline cr is 1.8 from 5/2017. He has been started on iv lasix q 12. He is on diovan as an outpt. [facility].     1/27: pt seen in room, no cp or sob    1/28: Denies shortness of breath; leg edema essentially unchanged, severe    1/29: bumex drip started 1/28; no UO recorded; he has been using commode    1/30: He denies shortness of breath; he thinks his leg edema is decreasing    1/31: pt seen in room. Edema improving    2/1: pt seen in room, feels better, eating ok, no sob    2/2: pt seen in room ,on bumex drip. 2/3: pt complaining of swelling in thighs and abd      PROBLEM LIST:    Patient Active Problem List   Diagnosis    [facility] mellitus [facility]    CAD (coronary artery disease)    COPD exacerbation ([address]    Morbid obesity due to excess calories ([address]    CHF exacerbation ([facility]    Essential hypertension    Pure hypercholesterolemia    Acute on chronic diastolic congestive heart failure (HCC)    Chronic a-fib (HCC)    Chronic kidney disease, stage III (moderate) (HCC)    Heart failure (HCC)    Leg swelling          DIET:    ADULT DIET; Regular;  Low Sodium (2 gm)     PHYSICAL EXAM:     Patient Vitals for the past 24 hrs:   BP Temp Temp src Pulse Resp SpO2   02/03/22 0748 120/73 97.9 °F (36.6 °C) Oral 88 20 95 %   02/02/22 2045 (!) 141/78 97.8 °F (36.6 °C) Oral 90 19 94 %   02/02/22 1531 106/72 98 °F (36.7 °C) Oral 90 18 95 %   @      Intake/Output Summary (Last 24 hours) at 2/3/2022 1432  Last data filed at 2/3/2022 1401  Gross per 24 hour   Intake 600 ml   Output 3150 ml   Net -2550 ml         Wt Readings from Last 3 Encounters:   01/28/22 232 lb 9.6 oz (105.5 kg)   02/11/20 223 lb (101.2 kg)   03/24/19 216 lb (98 kg)       Constitutional:  Pt is in no acute distress  Head: normocephalic, atraumatic  Neck: no JVD  Cardiovascular: regular rate and rhythm, no murmurs, gallops, or rubs  Respiratory:  No rales, rhochi, or wheezes  Gastrointestinal:  Soft, nontender, nondistended, bowel sounds x 4  Ext: edema decreasing  Skin: dry, no rash  Neuro: aaox3    MEDS (scheduled):    tamsulosin  0.4 mg Oral Daily    insulin lispro  0-6 Units SubCUTAneous TID WC    insulin lispro  0-3 Units SubCUTAneous Nightly    insulin glargine-yfgn  45 Units SubCUTAneous Nightly    epoetin roly-epbx  3,000 Units SubCUTAneous Once per day on Mon Wed Fri    apixaban  2.5 mg Oral BID    ipratropium-albuterol  1 vial Nebulization TID    isosorbide mononitrate  30 mg Oral Daily    metoprolol succinate  50 mg Oral Daily    pantoprazole  40 mg Oral QAM AC    valsartan  40 mg Oral BID    sodium chloride flush  5-40 mL IntraVENous 2 times per day       MEDS (infusions):   dextrose      bumetanide 0.1 mg/mL infusion 1 mg/hr (02/03/22 0549)    sodium chloride         MEDS (prn):  glucose, dextrose, glucagon (rDNA), dextrose, aluminum & magnesium hydroxide-simethicone, technetium sestamibi, sodium chloride flush, sodium chloride, ondansetron **OR** ondansetron, polyethylene glycol, acetaminophen **OR** acetaminophen, perflutren lipid microspheres, albuterol    DATA:    Recent Labs     02/01/22 0449 02/02/22 0453 02/03/22 0447   WBC 6.8 6.5 6.6   HGB 9.6* 9.9* 10.1*   HCT 30.9* 32.4* 32.6*   MCV 96.9 97.0 97.9    324 341     Recent Labs     02/01/22  0449 02/02/22 0453 02/03/22 0447    135 138   K 3.7 3.4* 3.6   CL 95* 92* 94*   CO2 31* 33* 32*   BUN 38* 40* 40*   CREATININE 1.8* 1.9* 2.0*   LABGLOM 36 34 32   GLUCOSE 114* 121* 122*   CALCIUM 8.7 8.8 9.2   ALT 9 7 7   AST 14 13 12 BILITOT 1.0 1.1 1.4*   ALKPHOS 153* 148* 145*   MG  --  2.1  --        Lab Results   Component Value Date    LABALBU 3.6 02/03/2022    LABALBU 3.4 (L) 02/02/2022    LABALBU 3.3 (L) 02/01/2022     Lab Results   Component Value Date    TSH 0.548 12/12/2015       Iron Studies  Lab Results   Component Value Date    IRON 51 (L) 01/26/2022    TIBC 271 01/26/2022    FERRITIN 89 01/27/2022     Vitamin B-12   Date Value Ref Range Status   01/27/2022 349 211 - 946 pg/mL Final     Folate   Date Value Ref Range Status   01/27/2022 11.8 4.8 - 24.2 ng/mL Final       No results found for: VITD25  PTH   Date Value Ref Range Status   01/28/2022 78 (H) 15 - 65 pg/mL Final       No components found for: URIC    No results found for: VOL, APPEARANCE, COLORU, LABSPEC, LABPH, LEUKBLD, NITRU, GLUCOSEU, KETUA, UROBILINOGEN, KETUA, UROBILINOGEN, BILIRUBINUR, OCBU    No results found for: LIPIDPAN      IMPRESSION/RECOMMENDATIONS:      1. arf on ckd 3b  Baseline cr 1.8 from 5/2017  Cr 2.1>2>1.8  In setting of decompensated heart failure  Ran out of home meds  Follow on the L8 [employer]  Cr is near baseline     2. Acute decompensated diastolic heart failure  H/o mvp mod mr and mod p htn  But response to intermittent diuretic dosing  bumex drip started 1/28  Card seeing  Reluctant to have a Martinez catheter at this  Reinforced importance of measuring his urine  uo -2.4L  -11L overall  Wt 245>232 lbs  Will change to po in am if thighs abd better     3. H/o cad sp acb  Per cardiology     4. HTN  Follow on diovan, toprol     5. Anemia  Dose monique if hgb <10  b12 fol fe normal        Jackson Serrano MD

## 2022-02-03 NOTE — CARE COORDINATION
Updates faxed to [facility] at [address] [phone] he can be reached at [phone] ext [phone]. Plan remains home when released. Bumex gtt continues. For questions I can be reached at [phone].  Gulshan Cantu, [facility]

## 2022-02-03 NOTE — PROGRESS NOTES
[id number] [facility] Cardiology Inpatient Progress Note    Patient is a 80 y.o. male of No primary care provider on file. seen in hospital follow up. Chief complaint: SOB/CHF    HPI: Some SOB. No CP. Patient Active Problem List   Diagnosis    Diabetes mellitus ([facility]    CAD (coronary artery disease)    COPD exacerbation ([facility]    Morbid obesity due to excess calories ([facility]    CHF exacerbation ([facility] [address] [facility]    Essential hypertension    Pure hypercholesterolemia    Acute on chronic diastolic congestive heart failure (HCC)    Chronic a-fib (HCC)    Chronic kidney disease, stage III (moderate) (HCC)    Heart failure (HCC)    Leg swelling       Allergies   Allergen Reactions    Iodine Hives     Other reaction(s):  HIVES       Current Facility-Administered Medications   Medication Dose Route Frequency Provider Last Rate Last Admin    tamsulosin (FLOMAX) capsule 0.4 mg  0.4 mg Oral Daily Christiano Juárez MD   0.4 mg at 02/03/22 4191    insulin lispro (HUMALOG) injection vial 0-6 Units  0-6 Units SubCUTAneous TID [facility] Glenn Loyd MD        insulin lispro (HUMALOG) injection vial 0-3 Units  0-3 Units SubCUTAneous Nightly Javier Martinez MD        glucose (GLUTOSE) 40 % oral gel 15 g  15 g Oral PRN Javier Martinez MD        dextrose 50 % IV solution  12.5 g IntraVENous PRN Javier Martinez MD        glucagon (rDNA) injection 1 mg  1 mg IntraMUSCular PRN Javier Martinez MD        dextrose 5 % solution  100 mL/hr IntraVENous PRN Javier Martinez MD        insulin glargine-yfgn (SEMGLEE-YFGN) injection vial 45 Units  45 Units SubCUTAneous Nightly Javier Martinez MD        aluminum & magnesium hydroxide-simethicone (MAALOX) 200-200-20 MG/5ML suspension 30 mL  30 mL Oral Q6H PRN Javier Martinez MD   30 mL at 01/28/22 1125    bumetanide (BUMEX) 12.5 mg in sodium chloride 0.9 % 125 mL infusion  1 mg/hr IntraVENous Continuous Blank Roberto MD 10 mL/hr at 02/03/22 0549 1 mg/hr at 02/03/22 0549    technetium sestamibi (CARDIOLITE) injection 35 millicurie  35 millicurie IntraVENous ONCE PRN Marimar Rogers MD        epoetin roly-epbx (RETACRIT) injection 3,000 Units  3,000 Units SubCUTAneous Once per day on Mon Wed Fri Chad Bailon MD   3,000 Units at 02/02/22 3296    apixaban (ELIQUIS) tablet 2.5 mg  2.5 mg Oral BID Albino Mash, DO   2.5 mg at 02/03/22 5534    ipratropium-albuterol (DUONEB) nebulizer solution 3 mL  1 vial Nebulization TID Albino Mash, DO   3 mL at 02/03/22 0816    isosorbide mononitrate (IMDUR) extended release tablet 30 mg  30 mg Oral Daily Albino Mash, DO   30 mg at 02/03/22 8610    metoprolol succinate (TOPROL XL) extended release tablet 50 mg  50 mg Oral Daily Albino Mash, DO   50 mg at 02/03/22 4886    pantoprazole (PROTONIX) tablet 40 mg  40 mg Oral QAM AC Albino Mash, DO   40 mg at 02/03/22 0550    valsartan (DIOVAN) tablet 40 mg  40 mg Oral BID Albino Mash, DO   40 mg at 02/03/22 0122    sodium chloride flush 0.9 % injection 5-40 mL  5-40 mL IntraVENous 2 times per day Albino Mash, DO   10 mL at 02/01/22 2051    sodium chloride flush 0.9 % injection 5-40 mL  5-40 mL IntraVENous PRN Albino Mash, DO        0.9 % sodium chloride infusion  25 mL IntraVENous PRN Albino Mash, DO        ondansetron (ZOFRAN-ODT) disintegrating tablet 4 mg  4 mg Oral Q8H PRN Albino Mash, DO        Or    ondansetron [facility]) injection 4 mg  4 mg IntraVENous Q6H PRN Albino Mash, DO        polyethylene glycol (GLYCOLAX) packet 17 g  17 g Oral Daily PRN Albino Mash, DO        acetaminophen (TYLENOL) tablet 650 mg  650 mg Oral Q6H PRN Albino Mash, DO   650 mg at 02/02/22 2104    Or    acetaminophen (TYLENOL) suppository 650 mg  650 mg Rectal Q6H PRN Albino Mash, DO        perflutren lipid microspheres (DEFINITY) injection 1.65 mg  1.5 mL IntraVENous ONCE PRN Hakeem Milner DO        albuterol (PROVENTIL) nebulizer solution 2.5 mg  2.5 mg Nebulization 4x Daily PRN Hakeem Milner DO           Review of systems:   Heart: as above   Lungs: as above   Eyes: denies changes in vision or discharge. Ears: denies changes in hearing or pain. Nose: denies epistaxis or masses   Throat: denies sore throat or trouble swallowing. Neuro: denies numbness, tingling, tremors. Skin: denies rashes or itching. : denies hematuria, dysuria   GI: denies vomiting, diarrhea   Psych: denies mood changed, anxiety, depression. Physical Exam   /73   Pulse 88   Temp 97.9 °F (36.6 °C) (Oral)   Resp 20   Ht 5' 9\" (1.753 m)   Wt 232 lb 9.6 oz (105.5 kg)   SpO2 95%   BMI 34.35 kg/m²   Constitutional: Oriented to person, place, and time. No distress. Well developed. Head: Normocephalic and atraumatic. Neck: Neck supple. No hepatojugular reflux. No JVD present. Carotid bruit is not present. No tracheal deviation present. No thyromegaly present. Cardiovascular: Normal rate, regular rhythm, normal heart sounds. intact distal pulses. No gallop and no friction rub. No murmur heard. Pulmonary: Breath sounds normal. No respiratory distress. No wheezes. No rales. Chest: Effort normal. No tenderness. Abdominal: Soft. Bowel sounds are normal. No distension or mass. No tenderness, rebound or guarding. Musculoskeletal: . No tenderness. No clubbing or cyanosis. Extremitites: Intact distal pulses. No edema  Neurological: Alert and oriented to person, place, and time. Skin: Skin is warm and dry. No rash noted. Not diaphoretic. No erythema. Psychiatric: Normal mood and affect. Behavior is normal.   Lymphadenopathy: No cervical adenopathy. No groin adenopathy.     CBC:   Lab Results   Component Value Date    WBC 6.6 02/03/2022    RBC 3.33 02/03/2022    HGB 10.1 02/03/2022    HCT 32.6 02/03/2022    MCV 97.9 02/03/2022    MCH 30.3 02/03/2022    MCHC 31.0 02/03/2022    RDW 15.1 02/03/2022     02/03/2022    MPV 8.8 02/03/2022     BMP:   Lab Results   Component Value Date     02/03/2022    K 3.6 02/03/2022 CL 94 02/03/2022    CO2 32 02/03/2022    BUN 40 02/03/2022    LABALBU 3.6 02/03/2022    CREATININE 2.0 02/03/2022    CALCIUM 9.2 02/03/2022    GFRAA 39 02/03/2022    LABGLOM 32 02/03/2022     Magnesium:    Lab Results   Component Value Date    MG 2.1 02/02/2022     Cardiac Enzymes:   Lab Results   Component Value Date    CKTOTAL 165 05/11/2017    CKTOTAL 134 05/11/2017    CKMB 1.4 05/11/2017    CKMB 1.3 05/11/2017    TROPHS 47 (H) 01/31/2022    TROPHS 49 (H) 01/31/2022    TROPHS 47 (H) 01/30/2022      PT/INR:    Lab Results   Component Value Date    PROTIME 16.5 01/25/2022    INR 1.5 01/25/2022     TSH:    Lab Results   Component Value Date    TSH 0.548 12/12/2015     Rhythm Strip: atrial fibrillation. Echocardiogram-1/29/2022:   Left ventricle size is normal.   Ejection fraction is visually estimated at 45-50%. Overall ejection fraction mildly decreased . Mild concentric left ventricular hypertrophy. Stage III diastolic dysfunction. The left atrium is severely dilated. Normal right ventricular size. Right ventricle global systolic function is moderately reduced . TAPSE 12 mm. Mild mitral annular calcification. S/p [id number] repair of the mitral valve with well seated MitraClip. Mitral valve mean gradient 5 mmHg. Mild mitral regurgitation with posteriorly directed jet. No hemodynamically significant aortic stenosis is present. BELLO by direct planimetry is 3.9 cm2. RVSP is 41 mmHg. Pulmonary hypertension is mild . No evidence for hemodynamically significant pericardial effusion. Stress test-1/25/2022: The myocardial perfusion imaging was abnormal with a large-sized,   severe intensity fixed defect of the inferior wall. There is also a   moderate-sized, moderate intensity partially reversible defect of the   anterior. Overall left ventricular systolic function was normal with wall motion   abnormality. Intermediate risk study.      ASSESSMENT & PLAN:    Patient Active Problem List   Diagnosis    Diabetes mellitus ([facility] [facility]    CAD (coronary artery disease)    COPD exacerbation ([facility]    Morbid obesity due to excess calories ([facility] [facility]    CHF exacerbation ([facility] [address] [facility]    Essential hypertension    Pure hypercholesterolemia    Acute on chronic diastolic congestive heart failure (HCC)    Chronic a-fib (HCC)    Chronic kidney disease, stage III (moderate) (HCC)    Heart failure (HCC)    Leg swelling     1. Acute on chronic diastolic CHF:     Echo as above. Diurese per renal.      BB/ARB. Diuresing. 2. CAD-CABG at [facility] 2007/Elevated troponin:     Medically manage. Pharm stress as above. Medically manage for now. BB/imdur/statin. No ASA due to eliquis. 3. 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 Echo as above. Observe. 4. Chronic Afib: Dose adjusted eliquis. BB.      5. CKD: Follow labs. 6. HTN: Observe. 7. Lipids: Statin. 8. DM     9. COPD     10. Anemia: Follow labs. 11. Hx of ARON Valdes D.O.   Cardiologist  Cardiology, [address]

## 2022-02-04 LAB
ALBUMIN SERPL-MCNC: 3.6 G/DL (ref 3.5–5.2)
ALP BLD-CCNC: 154 U/L (ref 40–129)
ALT SERPL-CCNC: 8 U/L (ref 0–40)
ANION GAP SERPL CALCULATED.3IONS-SCNC: 13 MMOL/L (ref 7–16)
AST SERPL-CCNC: 14 U/L (ref 0–39)
BASOPHILS ABSOLUTE: 0.04 E9/L (ref 0–0.2)
BASOPHILS RELATIVE PERCENT: 0.5 % (ref 0–2)
BILIRUB SERPL-MCNC: 1.6 MG/DL (ref 0–1.2)
BUN BLDV-MCNC: 40 MG/DL (ref 6–23)
CALCIUM SERPL-MCNC: 9 MG/DL (ref 8.6–10.2)
CHLORIDE BLD-SCNC: 94 MMOL/L (ref 98–107)
CO2: 32 MMOL/L (ref 22–29)
CREAT SERPL-MCNC: 2 MG/DL (ref 0.7–1.2)
EOSINOPHILS ABSOLUTE: 0.46 E9/L (ref 0.05–0.5)
EOSINOPHILS RELATIVE PERCENT: 6.3 % (ref 0–6)
GFR AFRICAN AMERICAN: 39
GFR NON-AFRICAN AMERICAN: 32 ML/MIN/1.73
GLUCOSE BLD-MCNC: 125 MG/DL (ref 74–99)
HCT VFR BLD CALC: 35.5 % (ref 37–54)
HEMOGLOBIN: 11 G/DL (ref 12.5–16.5)
IMMATURE GRANULOCYTES #: 0.03 E9/L
IMMATURE GRANULOCYTES %: 0.4 % (ref 0–5)
LYMPHOCYTES ABSOLUTE: 0.97 E9/L (ref 1.5–4)
LYMPHOCYTES RELATIVE PERCENT: 13.2 % (ref 20–42)
MCH RBC QN AUTO: 29.8 PG (ref 26–35)
MCHC RBC AUTO-ENTMCNC: 31 % (ref 32–34.5)
MCV RBC AUTO: 96.2 FL (ref 80–99.9)
METER GLUCOSE: 121 MG/DL (ref 74–99)
METER GLUCOSE: 132 MG/DL (ref 74–99)
METER GLUCOSE: 134 MG/DL (ref 74–99)
MONOCYTES ABSOLUTE: 0.94 E9/L (ref 0.1–0.95)
MONOCYTES RELATIVE PERCENT: 12.8 % (ref 2–12)
NEUTROPHILS ABSOLUTE: 4.89 E9/L (ref 1.8–7.3)
NEUTROPHILS RELATIVE PERCENT: 66.8 % (ref 43–80)
PDW BLD-RTO: 15.1 FL (ref 11.5–15)
PLATELET # BLD: 406 E9/L (ref 130–450)
PMV BLD AUTO: 9 FL (ref 7–12)
POTASSIUM REFLEX MAGNESIUM: 3.8 MMOL/L (ref 3.5–5)
RBC # BLD: 3.69 E12/L (ref 3.8–5.8)
SODIUM BLD-SCNC: 139 MMOL/L (ref 132–146)
TOTAL PROTEIN: 7.9 G/DL (ref 6.4–8.3)
WBC # BLD: 7.3 E9/L (ref 4.5–11.5)

## 2022-02-04 PROCEDURE — 94640 AIRWAY INHALATION TREATMENT: CPT

## 2022-02-04 PROCEDURE — 2580000003 HC RX 258: Performed by: INTERNAL MEDICINE

## 2022-02-04 PROCEDURE — 2060000000 HC ICU INTERMEDIATE R&B

## 2022-02-04 PROCEDURE — 6370000000 HC RX 637 (ALT 250 FOR IP): Performed by: FAMILY MEDICINE

## 2022-02-04 PROCEDURE — 99233 SBSQ HOSP IP/OBS HIGH 50: CPT | Performed by: INTERNAL MEDICINE

## 2022-02-04 PROCEDURE — 82962 GLUCOSE BLOOD TEST: CPT

## 2022-02-04 PROCEDURE — 2580000003 HC RX 258: Performed by: FAMILY MEDICINE

## 2022-02-04 PROCEDURE — 85025 COMPLETE CBC W/AUTO DIFF WBC: CPT

## 2022-02-04 PROCEDURE — 80053 COMPREHEN METABOLIC PANEL: CPT

## 2022-02-04 PROCEDURE — 6370000000 HC RX 637 (ALT 250 FOR IP): Performed by: INTERNAL MEDICINE

## 2022-02-04 PROCEDURE — 6360000002 HC RX W HCPCS: Performed by: INTERNAL MEDICINE

## 2022-02-04 PROCEDURE — 6370000000 HC RX 637 (ALT 250 FOR IP): Performed by: STUDENT IN AN ORGANIZED HEALTH CARE EDUCATION/TRAINING PROGRAM

## 2022-02-04 PROCEDURE — 2700000000 HC OXYGEN THERAPY PER DAY

## 2022-02-04 PROCEDURE — 36415 COLL VENOUS BLD VENIPUNCTURE: CPT

## 2022-02-04 PROCEDURE — 2500000003 HC RX 250 WO HCPCS: Performed by: INTERNAL MEDICINE

## 2022-02-04 RX ORDER — DOCUSATE SODIUM 100 MG/1
100 CAPSULE, LIQUID FILLED ORAL DAILY
Status: DISCONTINUED | OUTPATIENT
Start: 2022-02-04 | End: 2022-02-07 | Stop reason: HOSPADM

## 2022-02-04 RX ADMIN — TAMSULOSIN HYDROCHLORIDE 0.4 MG: 0.4 CAPSULE ORAL at 09:01

## 2022-02-04 RX ADMIN — IPRATROPIUM BROMIDE AND ALBUTEROL SULFATE 3 ML: .5; 2.5 SOLUTION RESPIRATORY (INHALATION) at 08:02

## 2022-02-04 RX ADMIN — VALSARTAN 40 MG: 80 TABLET, FILM COATED ORAL at 09:01

## 2022-02-04 RX ADMIN — APIXABAN 2.5 MG: 2.5 TABLET, FILM COATED ORAL at 09:01

## 2022-02-04 RX ADMIN — ACETAMINOPHEN 650 MG: 325 TABLET ORAL at 23:00

## 2022-02-04 RX ADMIN — BUMETANIDE 1 MG/HR: 0.25 INJECTION INTRAMUSCULAR; INTRAVENOUS at 05:18

## 2022-02-04 RX ADMIN — APIXABAN 2.5 MG: 2.5 TABLET, FILM COATED ORAL at 21:12

## 2022-02-04 RX ADMIN — METOPROLOL SUCCINATE 50 MG: 50 TABLET, FILM COATED, EXTENDED RELEASE ORAL at 09:01

## 2022-02-04 RX ADMIN — BUMETANIDE 1 MG/HR: 0.25 INJECTION INTRAMUSCULAR; INTRAVENOUS at 18:26

## 2022-02-04 RX ADMIN — SODIUM CHLORIDE, PRESERVATIVE FREE 10 ML: 5 INJECTION INTRAVENOUS at 21:00

## 2022-02-04 RX ADMIN — ISOSORBIDE MONONITRATE 30 MG: 30 TABLET, EXTENDED RELEASE ORAL at 09:01

## 2022-02-04 RX ADMIN — DOCUSATE SODIUM 100 MG: 100 CAPSULE, LIQUID FILLED ORAL at 15:16

## 2022-02-04 RX ADMIN — EPOETIN ALFA-EPBX 3000 UNITS: 3000 INJECTION, SOLUTION INTRAVENOUS; SUBCUTANEOUS at 09:02

## 2022-02-04 RX ADMIN — CHLOROTHIAZIDE SODIUM 250 MG: 500 INJECTION INTRAVENOUS at 17:00

## 2022-02-04 RX ADMIN — PANTOPRAZOLE SODIUM 40 MG: 40 TABLET, DELAYED RELEASE ORAL at 05:13

## 2022-02-04 RX ADMIN — VALSARTAN 40 MG: 80 TABLET, FILM COATED ORAL at 21:12

## 2022-02-04 RX ADMIN — IPRATROPIUM BROMIDE AND ALBUTEROL SULFATE 3 ML: .5; 2.5 SOLUTION RESPIRATORY (INHALATION) at 19:56

## 2022-02-04 ASSESSMENT — PAIN SCALES - GENERAL
PAINLEVEL_OUTOF10: 0
PAINLEVEL_OUTOF10: 4
PAINLEVEL_OUTOF10: 0

## 2022-02-04 ASSESSMENT — PAIN DESCRIPTION - DESCRIPTORS: DESCRIPTORS: ACHING;HEADACHE;DULL

## 2022-02-04 ASSESSMENT — PAIN DESCRIPTION - PAIN TYPE: TYPE: ACUTE PAIN

## 2022-02-04 NOTE — PROGRESS NOTES
[facility] Cardiology Inpatient Progress Note    Patient is a 80 y.o. male of No primary care provider on file. seen in hospital follow up. Chief complaint: SOB/CHF    HPI: No CP or SOB. Patient Active Problem List   Diagnosis    Diabetes mellitus ([facility] [address]    CAD (coronary artery disease)    COPD exacerbation ([facility] [address] [facility]    Morbid obesity due to excess calories ([facility] [address]    CHF exacerbation ([facility] [address] [facility]    Essential hypertension    Pure hypercholesterolemia    Acute on chronic diastolic congestive heart failure (HCC)    Chronic a-fib (HCC)    Chronic kidney disease, stage III (moderate) (HCC)    Heart failure (HCC)    Leg swelling       Allergies   Allergen Reactions    Iodine Hives     Other reaction(s):  HIVES       Current Facility-Administered Medications   Medication Dose Route Frequency Provider Last Rate Last Admin    tamsulosin (FLOMAX) capsule 0.4 mg  0.4 mg Oral Daily Vince Desai MD   0.4 mg at 02/04/22 0901    insulin lispro (HUMALOG) injection vial 0-6 Units  0-6 Units SubCUTAneous TID  Pennie Murphy MD        insulin lispro (HUMALOG) injection vial 0-3 Units  0-3 Units SubCUTAneous Nightly Jesus Gary MD        glucose (GLUTOSE) 40 % oral gel 15 g  15 g Oral PRN Jesus Gary MD        dextrose 50 % IV solution  12.5 g IntraVENous PRN Jesus Gary MD        glucagon (rDNA) injection 1 mg  1 mg IntraMUSCular PRN Jesus Gary MD        dextrose 5 % solution  100 mL/hr IntraVENous PRN Jesus Gary MD        insulin glargine-yfgn (SEMGLEE-YFGN) injection vial 45 Units  45 Units SubCUTAneous Nightly Jesus Gary MD        aluminum & magnesium hydroxide-simethicone (MAALOX) 200-200-20 MG/5ML suspension 30 mL  30 mL Oral Q6H PRN Jesus Gary MD   30 mL at 01/28/22 1125    bumetanide (BUMEX) 12.5 mg in sodium chloride 0.9 % 125 mL infusion  1 mg/hr IntraVENous Continuous Carolyn Roberto MD 10 mL/hr at 02/04/22 0518 1 mg/hr at 02/04/22 0518    technetium sestamibi (CARDIOLITE) injection 35 millicurie  35 millicurie IntraVENous ONCE PRN Anna Cherry MD        epoetin roly-epbx (RETACRIT) injection 3,000 Units  3,000 Units SubCUTAneous Once per day on Mon Wed Fri Maria Eugenia Pearce MD   3,000 Units at 02/04/22 0902    apixaban (ELIQUIS) tablet 2.5 mg  2.5 mg Oral BID Burnie Neer, DO   2.5 mg at 02/04/22 0901    ipratropium-albuterol (DUONEB) nebulizer solution 3 mL  1 vial Nebulization TID Burnie Neer, DO   3 mL at 02/04/22 0802    isosorbide mononitrate (IMDUR) extended release tablet 30 mg  30 mg Oral Daily Burnie Neer, DO   30 mg at 02/04/22 0901    metoprolol succinate (TOPROL XL) extended release tablet 50 mg  50 mg Oral Daily Burnie Neer, DO   50 mg at 02/04/22 0901    pantoprazole (PROTONIX) tablet 40 mg  40 mg Oral QAM AC Burnie Neer, DO   40 mg at 02/04/22 0513    valsartan (DIOVAN) tablet 40 mg  40 mg Oral BID Burnie Neer, DO   40 mg at 02/04/22 0901    sodium chloride flush 0.9 % injection 5-40 mL  5-40 mL IntraVENous 2 times per day Burnie Neer, DO   10 mL at 02/01/22 2051    sodium chloride flush 0.9 % injection 5-40 mL  5-40 mL IntraVENous PRN Burnie Neer, DO        0.9 % sodium chloride infusion  25 mL IntraVENous PRN Burnie Neer, DO        ondansetron (ZOFRAN-ODT) disintegrating tablet 4 mg  4 mg Oral Q8H PRN Burnie Neer, DO        Or    ondansetron [facility]) injection 4 mg  4 mg IntraVENous Q6H PRN Burnie Neer, DO        polyethylene glycol (GLYCOLAX) packet 17 g  17 g Oral Daily PRN Burnie Neer, DO   17 g at 02/03/22 1515    acetaminophen (TYLENOL) tablet 650 mg  650 mg Oral Q6H PRN Burnie Neer, DO   650 mg at 02/02/22 2104    Or    acetaminophen (TYLENOL) suppository 650 mg  650 mg Rectal Q6H PRN Burnie Neer, DO        perflutren lipid microspheres (DEFINITY) injection 1.65 mg  1.5 mL IntraVENous ONCE PRN Maryannie Neer, DO        albuterol (PROVENTIL) nebulizer solution 2.5 mg  2.5 mg Nebulization 4x Daily PRN Maryannie Neer, DO Review of systems:   Heart: as above   Lungs: as above   Eyes: denies changes in vision or discharge. Ears: denies changes in hearing or pain. Nose: denies epistaxis or masses   Throat: denies sore throat or trouble swallowing. Neuro: denies numbness, tingling, tremors. Skin: denies rashes or itching. : denies hematuria, dysuria   GI: denies vomiting, diarrhea   Psych: denies mood changed, anxiety, depression. Physical Exam   /79   Pulse 82   Temp 97.7 °F (36.5 °C) (Oral)   Resp 16   Ht 5' 9\" (1.753 m)   Wt 232 lb 9.6 oz (105.5 kg)   SpO2 95%   BMI 34.35 kg/m²   Constitutional: Oriented to person, place, and time. No distress. Well developed. Head: Normocephalic and atraumatic. Neck: Neck supple. No hepatojugular reflux. No JVD present. Carotid bruit is not present. No tracheal deviation present. No thyromegaly present. Cardiovascular: Normal rate, regular rhythm, normal heart sounds. intact distal pulses. No gallop and no friction rub. No murmur heard. Pulmonary: Breath sounds normal. No respiratory distress. No wheezes. No rales. Chest: Effort normal. No tenderness. Abdominal: Soft. Bowel sounds are normal. No distension or mass. No tenderness, rebound or guarding. Musculoskeletal: . No tenderness. No clubbing or cyanosis. Extremitites: Intact distal pulses. No edema  Neurological: Alert and oriented to person, place, and time. Skin: Skin is warm and dry. No rash noted. Not diaphoretic. No erythema. Psychiatric: Normal mood and affect. Behavior is normal.   Lymphadenopathy: No cervical adenopathy. No groin adenopathy.     CBC:   Lab Results   Component Value Date    WBC 7.3 02/04/2022    RBC 3.69 02/04/2022    HGB 11.0 02/04/2022    HCT 35.5 02/04/2022    MCV 96.2 02/04/2022    MCH 29.8 02/04/2022    MCHC 31.0 02/04/2022    RDW 15.1 02/04/2022     02/04/2022    MPV 9.0 02/04/2022     BMP:   Lab Results   Component Value Date     02/04/2022    K 3.8 02/04/2022    CL 94 02/04/2022    CO2 32 02/04/2022    BUN 40 02/04/2022    LABALBU 3.6 02/04/2022    CREATININE 2.0 02/04/2022    CALCIUM 9.0 02/04/2022    GFRAA 39 02/04/2022    LABGLOM 32 02/04/2022     Magnesium:    Lab Results   Component Value Date    MG 2.1 02/02/2022     Cardiac Enzymes:   Lab Results   Component Value Date    CKTOTAL 165 05/11/2017    CKTOTAL 134 05/11/2017    CKMB 1.4 05/11/2017    CKMB 1.3 05/11/2017    TROPHS 47 (H) 01/31/2022    TROPHS 49 (H) 01/31/2022    TROPHS 47 (H) 01/30/2022      PT/INR:    Lab Results   Component Value Date    PROTIME 16.5 01/25/2022    INR 1.5 01/25/2022     TSH:    Lab Results   Component Value Date    TSH 0.548 12/12/2015     Rhythm Strip: atrial fibrillation. Echocardiogram-1/29/2022:   Left ventricle size is normal.   Ejection fraction is visually estimated at 45-50%. Overall ejection fraction mildly decreased . Mild concentric left ventricular hypertrophy. Stage III diastolic dysfunction. The left atrium is severely dilated. Normal right ventricular size. Right ventricle global systolic function is moderately reduced . TAPSE 12 mm. Mild mitral annular calcification. S/p Vnmn-hj-Riqb repair of the mitral valve with well seated MitraClip. Mitral valve mean gradient 5 mmHg. Mild mitral regurgitation with posteriorly directed jet. No hemodynamically significant aortic stenosis is present. BELLO by direct planimetry is 3.9 cm2. RVSP is 41 mmHg. Pulmonary hypertension is mild . No evidence for hemodynamically significant pericardial effusion. Stress test-1/25/2022: The myocardial perfusion imaging was abnormal with a large-sized,   severe intensity fixed defect of the inferior wall. There is also a   moderate-sized, moderate intensity partially reversible defect of the   anterior. Overall left ventricular systolic function was normal with wall motion   abnormality. Intermediate risk study.      ASSESSMENT & PLAN:    Patient Active Problem List   Diagnosis    [facility] mellitus ([facility] [address]    CAD (coronary artery disease)    COPD exacerbation ([facility] [facility]    Morbid obesity due to excess calories ([facility] [facility]    CHF exacerbation ([facility] [address] [facility]    Essential hypertension    Pure hypercholesterolemia    Acute on chronic diastolic congestive heart failure (HCC)    Chronic a-fib (HCC)    Chronic kidney disease, stage III (moderate) (HCC)    Heart failure (HCC)    Leg swelling     1. Acute on chronic diastolic CHF:     Echo as above. Diurese per renal.      BB/ARB. 2. CAD-CABG at [facility] 2007/Elevated troponin:     Medically manage. Pharm stress as above. Medically manage for now. BB/imdur/statin. No ASA due to eliquis. 3. 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 Echo as above. Observe. 4. Chronic Afib: Dose adjusted eliquis. BB.      5. CKD: Follow labs. 6. HTN: Observe. 7. Lipids: Statin. 8. DM     9. COPD     10. Anemia: Follow labs. 11. Hx of GIB    Patient stable from CV standpoint. Please call if needed. STACIE. Lawrence Rebolledo D.O.   Cardiologist  Cardiology, [facility]

## 2022-02-04 NOTE — PROGRESS NOTES
[facility]  Nephrology Attending Progress Note          SUBJECTIVE:     1/26/22: The pt is an 81 yo male with a pmh of cad sp acb, dm, copd, htn, pfo,  hyperlipidemia, ckd , afib, p htn who presented with sob and swelling and weight gain. He ran out of his lasix 2 months ago. Labs show cr of 2.1, k 4.7, co2 27, bun 38, cr 2.1, ca 8.7, wbc 7.9, hgb 10.2, plt 347. Baseline cr is 1.8 from 5/2017. He has been started on iv lasix q 12. He is on diovan as an outpt. [facility].     1/27: pt seen in room, no cp or sob    1/28: Denies shortness of breath; leg edema essentially unchanged, severe    1/29: bumex drip started 1/28; no UO recorded; he has been using commode    1/30: He denies shortness of breath; he thinks his leg edema is decreasing    1/31: pt seen in room. Edema improving    2/1: pt seen in room, feels better, eating ok, no sob    2/2: pt seen in room ,on bumex drip. 2/3: pt complaining of swelling in thighs and abd    2/4: pt seen in room, feels thighs and abd still swollen      PROBLEM LIST:    Patient Active Problem List   Diagnosis    Diabetes mellitus ([facility] [address] [facility]    CAD (coronary artery disease)    COPD exacerbation ([facility]    Morbid obesity due to excess calories ([facility]    CHF exacerbation ([facility]    Essential hypertension    Pure hypercholesterolemia    Acute on chronic diastolic congestive heart failure (HCC)    Chronic a-fib (HCC)    Chronic kidney disease, stage III (moderate) (HCC)    Heart failure (HCC)    Leg swelling          DIET:    ADULT DIET; Regular;  Low Sodium (2 gm)     PHYSICAL EXAM:     Patient Vitals for the past 24 hrs:   BP Temp Temp src Pulse Resp SpO2   02/04/22 0900 130/79 97.7 °F (36.5 °C) Oral 82 16 95 %   02/04/22 0802 -- -- -- -- -- 94 %   02/03/22 2007 114/68 98.5 °F (36.9 °C) Oral 78 18 95 %   02/03/22 1508 112/63 98.8 °F (37.1 °C) Oral 82 20 94 %   @      Intake/Output Summary (Last 24 hours) at 2/4/2022 1130  Last data filed at 2/4/2022 1043  Gross per 24 hour   Intake 860 ml   Output 986 ml   Net -126 ml         Wt Readings from Last 3 Encounters:   01/28/22 232 lb 9.6 oz (105.5 kg)   02/11/20 223 lb (101.2 kg)   03/24/19 216 lb (98 kg)       Constitutional:  Pt is in no acute distress  Head: normocephalic, atraumatic  Neck: no JVD  Cardiovascular: regular rate and rhythm, no murmurs, gallops, or rubs  Respiratory:  No rales, rhochi, or wheezes  Gastrointestinal:  Soft, nontender, nondistended, bowel sounds x 4  Ext: edema decreasing  Skin: dry, no rash  Neuro: aaox3    MEDS (scheduled):    tamsulosin  0.4 mg Oral Daily    insulin lispro  0-6 Units SubCUTAneous TID WC    insulin lispro  0-3 Units SubCUTAneous Nightly    insulin glargine-yfgn  45 Units SubCUTAneous Nightly    epoetin roly-epbx  3,000 Units SubCUTAneous Once per day on Mon Wed Fri    apixaban  2.5 mg Oral BID    ipratropium-albuterol  1 vial Nebulization TID    isosorbide mononitrate  30 mg Oral Daily    metoprolol succinate  50 mg Oral Daily    pantoprazole  40 mg Oral QAM AC    valsartan  40 mg Oral BID    sodium chloride flush  5-40 mL IntraVENous 2 times per day       MEDS (infusions):   dextrose      bumetanide 0.1 mg/mL infusion 1 mg/hr (02/04/22 0518)    sodium chloride         MEDS (prn):  glucose, dextrose, glucagon (rDNA), dextrose, aluminum & magnesium hydroxide-simethicone, technetium sestamibi, sodium chloride flush, sodium chloride, ondansetron **OR** ondansetron, polyethylene glycol, acetaminophen **OR** acetaminophen, perflutren lipid microspheres, albuterol    DATA:    Recent Labs     02/02/22 0453 02/03/22  0447 02/04/22  0517   WBC 6.5 6.6 7.3   HGB 9.9* 10.1* 11.0*   HCT 32.4* 32.6* 35.5*   MCV 97.0 97.9 96.2    341 406     Recent Labs     02/02/22  0453 02/03/22  0447 02/04/22  0517    138 139   K 3.4* 3.6 3.8   CL 92* 94* 94*   CO2 33* 32* 32*   BUN 40* 40* 40*   CREATININE 1.9* 2.0* 2.0* LABGLOM 34 32 32   GLUCOSE 121* 122* 125*   CALCIUM 8.8 9.2 9.0   ALT 7 7 8   AST 13 12 14   BILITOT 1.1 1.4* 1.6*   ALKPHOS 148* 145* 154*   MG 2.1  --   --        Lab Results   Component Value Date    LABALBU 3.6 02/04/2022    LABALBU 3.6 02/03/2022    LABALBU 3.4 (L) 02/02/2022     Lab Results   Component Value Date    TSH 0.548 12/12/2015       Iron Studies  Lab Results   Component Value Date    IRON 51 (L) 01/26/2022    TIBC 271 01/26/2022    FERRITIN 89 01/27/2022     Vitamin B-12   Date Value Ref Range Status   01/27/2022 349 211 - 946 pg/mL Final     Folate   Date Value Ref Range Status   01/27/2022 11.8 4.8 - 24.2 ng/mL Final       No results found for: VITD25  PTH   Date Value Ref Range Status   01/28/2022 78 (H) 15 - 65 pg/mL Final       No components found for: URIC    No results found for: VOL, APPEARANCE, COLORU, LABSPEC, LABPH, LEUKBLD, NITRU, GLUCOSEU, KETUA, UROBILINOGEN, KETUA, UROBILINOGEN, BILIRUBINUR, OCBU    No results found for: LIPIDPAN      IMPRESSION/RECOMMENDATIONS:      1. arf on ckd 3b  Baseline cr 1.8 from 5/2017  Cr 2.1>2>1.8  In setting of decompensated heart failure  Ran out of home meds  Follow on the diovan  Cr is near baseline     2. Acute decompensated diastolic heart failure  H/o mvp mod mr and mod p htn  bumex drip started 1/28  Card seeing  uo -2.1L  -12.8L overall  Wt 245>232 lbs  Will give diuril x3 to speed up diuresis     3. H/o cad sp acb  Per cardiology     4. HTN  Follow on diovan, toprol     5. Anemia  Dose monique if hgb <10  b12 fol fe normal        Ree Panda MD

## 2022-02-04 NOTE — PROGRESS NOTES
Hospitalist Progress Note      SYNOPSIS: Patient admitted on 2022 for <principal problem not specified>  Mr. Jewel Heller, a 80y.o. year old male  who  has a past medical history of CAD (coronary artery disease), CHF (congestive heart failure) ([facility] [address] Chronic atrial fibrillation ([facility] [address] Chronic kidney disease, stage III (moderate) ([facility] [address] COPD (chronic obstructive pulmonary disease) ([facility] [address] Diabetes mellitus ([facility] [address] Essential hypertension, GERD (gastroesophageal reflux disease), H. pylori infection, Hx of degenerative disc disease, Hyperlipidemia, MI (myocardial infarction) ([facility] [address] Osteoarthritis, PFO (patent foramen ovale), Popliteal cyst, and Pulmonary hypertension ([facility] [address]    Patient presented to the emergency department with complaints of shortness of breath and swelling of the legs. Also notes weight gain. Denies fever, chills, nausea, vomiting, chest pain, abdominal pain. Shortness of breath is worse with exertion. Has been out of his Lasix for the last 2 weeks. Vital signs within normal limits and stable. Currently 98% on 2 L nasal cannula. Laboratory studies reveal BUN 38, creatinine 2.1, proBNP 6407, troponin of 50, hemoglobin 10.0. Chest x-ray shows stable cardiomegaly. Bibasilar crackles on exam.  Patient given Lasix. Medicine consulted for admission.          SUBJECTIVE:  Feels okay today, no shortness of breath at this time. Continues to diurese with Bumex drip per nephrology, may switch to oral today  He denies any chest pain today    Stable overnight. No other overnight issues reported. Temp (24hrs), Av.7 °F (37.1 °C), Min:98.5 °F (36.9 °C), Max:98.8 °F (37.1 °C)    DIET: ADULT DIET; Regular;  Low Sodium (2 gm)  CODE: Full Code    Intake/Output Summary (Last 24 hours) at 2022 0900  Last data filed at 2022 0518  Gross per 24 hour   Intake 360 ml   Output 1286 ml   Net -926 ml       OBJECTIVE:    /68   Pulse 78   Temp 98.5 °F (36.9 °C) (Oral)   Resp 18   Ht 5' 9\" (1.753 m)   Wt 232 lb 9.6 oz (105.5 kg)   SpO2 95%   BMI 34.35 kg/m²     General appearance: No apparent distress, appears stated age and cooperative. HEENT:  Conjunctivae/corneas clear. Neck: Supple. No jugular venous distention. Respiratory: Clear to auscultation bilaterally, normal respiratory effort  Cardiovascular: Regular rate rhythm, normal S1-S2. Bilateral lower extremity edema with superficial wounds  Abdomen: Soft, nontender, nondistended  Musculoskeletal: No clubbing, cyanosis, no bilateral lower extremity edema. Some posterior thigh edema b/l. Brisk capillary refill. Neurologic: awake, alert and following commands     Assessment and plan:    #Acute on chronic diastolic CHF:  -IV  Bumex drip per cardiology and nephrology, Good urine output  -BNP ordered today-10,291  Continue beta-blockers and valsartan  -2D echo EF-45 to 50%  Cardiology input appreciated     #CAD-CABG at [facility] 2007/Elevated troponin:  -Nuclear stress test ordered by cardiology came back abnormal January 27  Continue beta-blocker Imdur  -Management per cardiology     #Valvular heart disease    -Hx of MVP with moderate MR and moderate PH.   -Echo EF 45 to 50%     #Chronic Afib:   -Rate controlled resume metoprolol and Eliquis    #Mild acute kidney injury  -Cardiorenal syndrome  -Improved with diuresis    #CKD stage III B  -Avoid nephrotoxic agent  -Consult nephrology while diuresing the patient input appreciated. Serum creatinine is stable at 1.8-2    #Hypertension  -Controlled     #Dyslipidemia     #Type 2 diabetes mellitus  -Held glipizide and Decreased Lantus to 45 unit due to hypoglycemia  -A1c 6.1  - currently stable      #COPD without exacerbation     #Anemia of chronic kidney disease    #History of GI bleed  -Monitor hemoglobin, currently at 10.1    #DVT prophylaxis patient already on Eliquis.       DISPOSITION: currently on IV diuresis-possible discharge today after switching to oral diuresis based on nephrology/cardiology recommendations    Medications:  REVIEWED DAILY    Infusion Medications    dextrose      bumetanide 0.1 mg/mL infusion 1 mg/hr (02/04/22 0518)    sodium chloride       Scheduled Medications    tamsulosin  0.4 mg Oral Daily    insulin lispro  0-6 Units SubCUTAneous TID WC    insulin lispro  0-3 Units SubCUTAneous Nightly    insulin glargine-yfgn  45 Units SubCUTAneous Nightly    epoetin roly-epbx  3,000 Units SubCUTAneous Once per day on Mon Wed Fri    apixaban  2.5 mg Oral BID    ipratropium-albuterol  1 vial Nebulization TID    isosorbide mononitrate  30 mg Oral Daily    metoprolol succinate  50 mg Oral Daily    pantoprazole  40 mg Oral QAM AC    valsartan  40 mg Oral BID    sodium chloride flush  5-40 mL IntraVENous 2 times per day     PRN Meds: glucose, dextrose, glucagon (rDNA), dextrose, aluminum & magnesium hydroxide-simethicone, technetium sestamibi, sodium chloride flush, sodium chloride, ondansetron **OR** ondansetron, polyethylene glycol, acetaminophen **OR** acetaminophen, perflutren lipid microspheres, albuterol    Labs:     Recent Labs     02/02/22 0453 02/03/22 0447 02/04/22  0517   WBC 6.5 6.6 7.3   HGB 9.9* 10.1* 11.0*   HCT 32.4* 32.6* 35.5*    341 406       Recent Labs     02/02/22 0453 02/03/22 0447 02/04/22  0517    138 139   K 3.4* 3.6 3.8   CL 92* 94* 94*   CO2 33* 32* 32*   BUN 40* 40* 40*   CREATININE 1.9* 2.0* 2.0*   CALCIUM 8.8 9.2 9.0       Recent Labs     02/02/22 0453 02/03/22 0447 02/04/22  0517   PROT 7.3 7.6 7.9   ALKPHOS 148* 145* 154*   ALT 7 7 8   AST 13 12 14   BILITOT 1.1 1.4* 1.6*       No results for input(s): INR in the last 72 hours. No results for input(s): Enrrique Hamm in the last 72 hours.     Chronic labs:    Lab Results   Component Value Date    CHOL 111 01/26/2022    TRIG 51 01/26/2022    HDL 36 01/26/2022    LDLCALC 65 01/26/2022    TSH 0.548 12/12/2015    INR 1.5 01/25/2022    LABA1C 6.1 (H) 01/26/2022 Radiology: REVIEWED DAILY    +++++++++++++++++++++++++++++++++++++++++++++++++  Marcela Mendiola MD  [facility] - [address] [place] [address]  +++++++++++++++++++++++++++++++++++++++++++++++++  NOTE: This report was transcribed using voice recognition software. Every effort was made to ensure accuracy; however, inadvertent computerized transcription errors may be present.

## 2022-02-05 LAB
ALBUMIN SERPL-MCNC: 3.8 G/DL (ref 3.5–5.2)
ALP BLD-CCNC: 154 U/L (ref 40–129)
ALT SERPL-CCNC: 8 U/L (ref 0–40)
ANION GAP SERPL CALCULATED.3IONS-SCNC: 13 MMOL/L (ref 7–16)
AST SERPL-CCNC: 12 U/L (ref 0–39)
BASOPHILS ABSOLUTE: 0.05 E9/L (ref 0–0.2)
BASOPHILS RELATIVE PERCENT: 0.7 % (ref 0–2)
BILIRUB SERPL-MCNC: 1.8 MG/DL (ref 0–1.2)
BUN BLDV-MCNC: 49 MG/DL (ref 6–23)
CALCIUM SERPL-MCNC: 9.4 MG/DL (ref 8.6–10.2)
CHLORIDE BLD-SCNC: 91 MMOL/L (ref 98–107)
CO2: 32 MMOL/L (ref 22–29)
CREAT SERPL-MCNC: 2.2 MG/DL (ref 0.7–1.2)
EOSINOPHILS ABSOLUTE: 0.52 E9/L (ref 0.05–0.5)
EOSINOPHILS RELATIVE PERCENT: 7.1 % (ref 0–6)
GFR AFRICAN AMERICAN: 35
GFR NON-AFRICAN AMERICAN: 29 ML/MIN/1.73
GLUCOSE BLD-MCNC: 127 MG/DL (ref 74–99)
HCT VFR BLD CALC: 36 % (ref 37–54)
HEMOGLOBIN: 11.3 G/DL (ref 12.5–16.5)
IMMATURE GRANULOCYTES #: 0.04 E9/L
IMMATURE GRANULOCYTES %: 0.5 % (ref 0–5)
LYMPHOCYTES ABSOLUTE: 1.2 E9/L (ref 1.5–4)
LYMPHOCYTES RELATIVE PERCENT: 16.3 % (ref 20–42)
MCH RBC QN AUTO: 29.7 PG (ref 26–35)
MCHC RBC AUTO-ENTMCNC: 31.4 % (ref 32–34.5)
MCV RBC AUTO: 94.7 FL (ref 80–99.9)
METER GLUCOSE: 119 MG/DL (ref 74–99)
METER GLUCOSE: 121 MG/DL (ref 74–99)
METER GLUCOSE: 135 MG/DL (ref 74–99)
MONOCYTES ABSOLUTE: 1.11 E9/L (ref 0.1–0.95)
MONOCYTES RELATIVE PERCENT: 15.1 % (ref 2–12)
NEUTROPHILS ABSOLUTE: 4.44 E9/L (ref 1.8–7.3)
NEUTROPHILS RELATIVE PERCENT: 60.3 % (ref 43–80)
PDW BLD-RTO: 15.1 FL (ref 11.5–15)
PLATELET # BLD: 420 E9/L (ref 130–450)
PMV BLD AUTO: 8.9 FL (ref 7–12)
POTASSIUM REFLEX MAGNESIUM: 3.7 MMOL/L (ref 3.5–5)
RBC # BLD: 3.8 E12/L (ref 3.8–5.8)
SODIUM BLD-SCNC: 136 MMOL/L (ref 132–146)
TOTAL PROTEIN: 8 G/DL (ref 6.4–8.3)
WBC # BLD: 7.4 E9/L (ref 4.5–11.5)

## 2022-02-05 PROCEDURE — 6370000000 HC RX 637 (ALT 250 FOR IP): Performed by: FAMILY MEDICINE

## 2022-02-05 PROCEDURE — 94640 AIRWAY INHALATION TREATMENT: CPT

## 2022-02-05 PROCEDURE — 82962 GLUCOSE BLOOD TEST: CPT

## 2022-02-05 PROCEDURE — 2060000000 HC ICU INTERMEDIATE R&B

## 2022-02-05 PROCEDURE — 2580000003 HC RX 258: Performed by: INTERNAL MEDICINE

## 2022-02-05 PROCEDURE — 85025 COMPLETE CBC W/AUTO DIFF WBC: CPT

## 2022-02-05 PROCEDURE — 80053 COMPREHEN METABOLIC PANEL: CPT

## 2022-02-05 PROCEDURE — 36415 COLL VENOUS BLD VENIPUNCTURE: CPT

## 2022-02-05 PROCEDURE — 2500000003 HC RX 250 WO HCPCS: Performed by: INTERNAL MEDICINE

## 2022-02-05 PROCEDURE — 6370000000 HC RX 637 (ALT 250 FOR IP): Performed by: INTERNAL MEDICINE

## 2022-02-05 PROCEDURE — 6360000002 HC RX W HCPCS: Performed by: INTERNAL MEDICINE

## 2022-02-05 PROCEDURE — 6370000000 HC RX 637 (ALT 250 FOR IP): Performed by: STUDENT IN AN ORGANIZED HEALTH CARE EDUCATION/TRAINING PROGRAM

## 2022-02-05 RX ADMIN — APIXABAN 2.5 MG: 2.5 TABLET, FILM COATED ORAL at 20:43

## 2022-02-05 RX ADMIN — VALSARTAN 40 MG: 80 TABLET, FILM COATED ORAL at 20:43

## 2022-02-05 RX ADMIN — CHLOROTHIAZIDE SODIUM 250 MG: 500 INJECTION INTRAVENOUS at 16:16

## 2022-02-05 RX ADMIN — VALSARTAN 40 MG: 80 TABLET, FILM COATED ORAL at 07:52

## 2022-02-05 RX ADMIN — CHLOROTHIAZIDE SODIUM 250 MG: 500 INJECTION INTRAVENOUS at 05:14

## 2022-02-05 RX ADMIN — ISOSORBIDE MONONITRATE 30 MG: 30 TABLET, EXTENDED RELEASE ORAL at 07:53

## 2022-02-05 RX ADMIN — PANTOPRAZOLE SODIUM 40 MG: 40 TABLET, DELAYED RELEASE ORAL at 05:12

## 2022-02-05 RX ADMIN — ACETAMINOPHEN 650 MG: 325 TABLET ORAL at 13:57

## 2022-02-05 RX ADMIN — IPRATROPIUM BROMIDE AND ALBUTEROL SULFATE 3 ML: .5; 2.5 SOLUTION RESPIRATORY (INHALATION) at 08:20

## 2022-02-05 RX ADMIN — BUMETANIDE 1 MG/HR: 0.25 INJECTION INTRAMUSCULAR; INTRAVENOUS at 20:42

## 2022-02-05 RX ADMIN — METOPROLOL SUCCINATE 50 MG: 50 TABLET, FILM COATED, EXTENDED RELEASE ORAL at 07:52

## 2022-02-05 RX ADMIN — TAMSULOSIN HYDROCHLORIDE 0.4 MG: 0.4 CAPSULE ORAL at 07:52

## 2022-02-05 RX ADMIN — APIXABAN 2.5 MG: 2.5 TABLET, FILM COATED ORAL at 07:52

## 2022-02-05 RX ADMIN — IPRATROPIUM BROMIDE AND ALBUTEROL SULFATE 3 ML: .5; 2.5 SOLUTION RESPIRATORY (INHALATION) at 20:00

## 2022-02-05 RX ADMIN — BUMETANIDE 1 MG/HR: 0.25 INJECTION INTRAMUSCULAR; INTRAVENOUS at 07:49

## 2022-02-05 RX ADMIN — IPRATROPIUM BROMIDE AND ALBUTEROL SULFATE 3 ML: .5; 2.5 SOLUTION RESPIRATORY (INHALATION) at 12:57

## 2022-02-05 RX ADMIN — DOCUSATE SODIUM 100 MG: 100 CAPSULE, LIQUID FILLED ORAL at 07:52

## 2022-02-05 ASSESSMENT — PAIN DESCRIPTION - PAIN TYPE: TYPE: ACUTE PAIN

## 2022-02-05 ASSESSMENT — PAIN SCALES - GENERAL
PAINLEVEL_OUTOF10: 5
PAINLEVEL_OUTOF10: 0
PAINLEVEL_OUTOF10: 2
PAINLEVEL_OUTOF10: 0
PAINLEVEL_OUTOF10: 0

## 2022-02-05 ASSESSMENT — PAIN DESCRIPTION - LOCATION: LOCATION: HEAD

## 2022-02-05 ASSESSMENT — PAIN DESCRIPTION - DESCRIPTORS: DESCRIPTORS: HEADACHE

## 2022-02-05 NOTE — PROGRESS NOTES
Hospitalist Progress Note      SYNOPSIS: Patient admitted on 2022 for <principal problem not specified>  Mr. Kecia Ng, a 80y.o. year old male  who  has a past medical history of CAD (coronary artery disease), CHF (congestive heart failure) ([facility] [address] Chronic atrial fibrillation ([facility] [address] Chronic kidney disease, stage III (moderate) ([facility] [address] COPD (chronic obstructive pulmonary disease) ([facility] [address] Diabetes mellitus ([facility] [address] Essential hypertension, GERD (gastroesophageal reflux disease), H. pylori infection, Hx of degenerative disc disease, Hyperlipidemia, MI (myocardial infarction) ([facility] [address] Osteoarthritis, PFO (patent foramen ovale), Popliteal cyst, and Pulmonary hypertension ([facility] [address]    Patient presented to the emergency department with complaints of shortness of breath and swelling of the legs. Also notes weight gain. Denies fever, chills, nausea, vomiting, chest pain, abdominal pain. Shortness of breath is worse with exertion. Has been out of his Lasix for the last 2 weeks. Vital signs within normal limits and stable. Currently 98% on 2 L nasal cannula. Laboratory studies reveal BUN 38, creatinine 2.1, proBNP 6407, troponin of 50, hemoglobin 10.0. Chest x-ray shows stable cardiomegaly. Bibasilar crackles on exam.  Patient given Lasix. Medicine consulted for admission.          SUBJECTIVE:  Pt was seen at bedside,  no shortness of breath at this time. Continues to diurese with Bumex drip per nephrology, may switch to oral tomorrow. Good UO  He denies any chest pain today    Stable overnight. No other overnight issues reported. Temp (24hrs), Av.2 °F (36.8 °C), Min:97.9 °F (36.6 °C), Max:98.4 °F (36.9 °C)    DIET: ADULT DIET; Regular;  Low Sodium (2 gm)  CODE: Full Code    Intake/Output Summary (Last 24 hours) at 2022 1411  Last data filed at 2022 0558  Gross per 24 hour   Intake 420 ml   Output 3275 ml   Net -2855 ml       OBJECTIVE:    BP 98/63   Pulse 58   Temp 98.3 °F (36.8 °C) (Oral) Resp 16   Ht 5' 9\" (1.753 m)   Wt 232 lb 9.6 oz (105.5 kg)   SpO2 95%   BMI 34.35 kg/m²     General appearance: No apparent distress, appears stated age and cooperative. HEENT:  Conjunctivae/corneas clear. Neck: Supple. No jugular venous distention. Respiratory: Clear to auscultation bilaterally, normal respiratory effort  Cardiovascular: Regular rate rhythm, normal S1-S2. Bilateral lower extremity edema with superficial wounds  Abdomen: Soft, nontender, nondistended  Musculoskeletal: No clubbing, cyanosis, no bilateral lower extremity edema. Some posterior thigh edema b/l. Brisk capillary refill. Neurologic: awake, alert and following commands     Assessment and plan:    #Acute on chronic diastolic CHF:  -IV  Bumex drip per cardiology and nephrology, Good urine output  -BNP ordered today-10,291  Continue beta-blockers and valsartan  -2D echo EF-45 to 50%  Cardiology input appreciated     #CAD-CABG at [facility] 2007/Elevated troponin:  -Nuclear stress test ordered by cardiology came back abnormal January 27  Continue beta-blocker Imdur  -Management per cardiology     #Valvular heart disease    -Hx of MVP with moderate MR and moderate PH.   -Echo EF 45 to 50%     #Chronic Afib:   -Rate controlled resume metoprolol and Eliquis    #Mild acute kidney injury  -Cardiorenal syndrome  -Improved with diuresis    #CKD stage III B  -Avoid nephrotoxic agent  -Consult nephrology while diuresing the patient input appreciated. Serum creatinine is stable at 1.8-2    #Hypertension  -Controlled     #Dyslipidemia     #Type 2 diabetes mellitus  -Held glipizide and Decreased Lantus to 45 unit due to hypoglycemia  -A1c 6.1  - currently stable      #COPD without exacerbation     #Anemia of chronic kidney disease    #History of GI bleed  -Monitor hemoglobin, currently at 10.1    #DVT prophylaxis patient already on Eliquis.       DISPOSITION: currently on IV diuresis-possible discharge tomorrow after switching to oral diuresis based on nephrology/cardiology recommendations    Medications:  REVIEWED DAILY    Infusion Medications    dextrose      bumetanide 0.1 mg/mL infusion 1 mg/hr (02/05/22 0749)    sodium chloride       Scheduled Medications    chlorothiazide (DIURIL) IVPB  250 mg IntraVENous Q12H    docusate sodium  100 mg Oral Daily    tamsulosin  0.4 mg Oral Daily    insulin lispro  0-6 Units SubCUTAneous TID WC    insulin lispro  0-3 Units SubCUTAneous Nightly    insulin glargine-yfgn  45 Units SubCUTAneous Nightly    epoetin roly-epbx  3,000 Units SubCUTAneous Once per day on Mon Wed Fri    apixaban  2.5 mg Oral BID    ipratropium-albuterol  1 vial Nebulization TID    isosorbide mononitrate  30 mg Oral Daily    metoprolol succinate  50 mg Oral Daily    pantoprazole  40 mg Oral QAM AC    valsartan  40 mg Oral BID    sodium chloride flush  5-40 mL IntraVENous 2 times per day     PRN Meds: glucose, dextrose, glucagon (rDNA), dextrose, aluminum & magnesium hydroxide-simethicone, technetium sestamibi, sodium chloride flush, sodium chloride, ondansetron **OR** ondansetron, polyethylene glycol, acetaminophen **OR** acetaminophen, perflutren lipid microspheres, albuterol    Labs:     Recent Labs     02/03/22 0447 02/04/22 0517 02/05/22  0502   WBC 6.6 7.3 7.4   HGB 10.1* 11.0* 11.3*   HCT 32.6* 35.5* 36.0*    406 420       Recent Labs     02/03/22 0447 02/04/22 0517 02/05/22  0502    139 136   K 3.6 3.8 3.7   CL 94* 94* 91*   CO2 32* 32* 32*   BUN 40* 40* 49*   CREATININE 2.0* 2.0* 2.2*   CALCIUM 9.2 9.0 9.4       Recent Labs     02/03/22 0447 02/04/22 0517 02/05/22  0502   PROT 7.6 7.9 8.0   ALKPHOS 145* 154* 154*   ALT 7 8 8   AST 12 14 12   BILITOT 1.4* 1.6* 1.8*       No results for input(s): INR in the last 72 hours. No results for input(s): Rafal Grates in the last 72 hours.     Chronic labs:    Lab Results   Component Value Date    CHOL 111 01/26/2022    TRIG 51 01/26/2022    HDL 36 01/26/2022    LDLCALC 65 01/26/2022    TSH 0.548 12/12/2015    INR 1.5 01/25/2022    LABA1C 6.1 (H) 01/26/2022       Radiology: REVIEWED DAILY    +++++++++++++++++++++++++++++++++++++++++++++++++  Chiquis Parnell MD  [address]  +++++++++++++++++++++++++++++++++++++++++++++++++  NOTE: This report was transcribed using voice recognition software. Every effort was made to ensure accuracy; however, inadvertent computerized transcription errors may be present.

## 2022-02-05 NOTE — PROGRESS NOTES
[facility]  Nephrology Attending Progress Note          SUBJECTIVE:     1/26/22: The pt is an 79 yo male with a pmh of cad sp acb, dm, copd, htn, pfo,  hyperlipidemia, ckd , afib, p htn who presented with sob and swelling and weight gain. He ran out of his lasix 2 months ago. Labs show cr of 2.1, k 4.7, co2 27, bun 38, cr 2.1, ca 8.7, wbc 7.9, hgb 10.2, plt 347. Baseline cr is 1.8 from 5/2017. He has been started on iv lasix q 12. He is on diovan as an outpt. [facility].     1/27: pt seen in room, no cp or sob    1/28: Denies shortness of breath; leg edema essentially unchanged, severe    1/29: bumex drip started 1/28; no UO recorded; he has been using commode    1/30: He denies shortness of breath; he thinks his leg edema is decreasing    1/31: pt seen in room. Edema improving    2/1: pt seen in room, feels better, eating ok, no sob    2/2: pt seen in room ,on bumex drip. 2/3: pt complaining of swelling in thighs and abd    2/4: pt seen in room, feels thighs and abd still swollen    2/5: pt seen in room, no cp or sob    PROBLEM LIST:    Patient Active Problem List   Diagnosis    Diabetes mellitus ([facility] [address]    CAD (coronary artery disease)    COPD exacerbation ([facility] [address]    Morbid obesity due to excess calories ([address]    CHF exacerbation ([facility] [address]    Essential hypertension    Pure hypercholesterolemia    Acute on chronic diastolic congestive heart failure (HCC)    Chronic a-fib (HCC)    Chronic kidney disease, stage III (moderate) (HCC)    Heart failure (HCC)    Leg swelling          DIET:    ADULT DIET; Regular;  Low Sodium (2 gm)     PHYSICAL EXAM:     Patient Vitals for the past 24 hrs:   BP Temp Temp src Pulse Resp SpO2   02/05/22 0750 98/63 98.3 °F (36.8 °C) Oral 58 16 95 %   02/05/22 0000 108/72 98.2 °F (36.8 °C) Oral 74 18 95 %   02/04/22 2015 104/75 98.4 °F (36.9 °C) Oral 79 18 --   02/04/22 1957 -- -- -- -- -- 96 %   02/04/22 9280 111/80 97.9 °F (36.6 °C) Oral 77 16 94 %   @      Intake/Output Summary (Last 24 hours) at 2/5/2022 1008  Last data filed at 2/5/2022 7286  Gross per 24 hour   Intake 660 ml   Output 3775 ml   Net -3115 ml         Wt Readings from Last 3 Encounters:   01/28/22 232 lb 9.6 oz (105.5 kg)   02/11/20 223 lb (101.2 kg)   03/24/19 216 lb (98 kg)       Constitutional:  Pt is in no acute distress  Head: normocephalic, atraumatic  Neck: no JVD  Cardiovascular: regular rate and rhythm, no murmurs, gallops, or rubs  Respiratory:  No rales, rhochi, or wheezes  Gastrointestinal:  Soft, nontender, nondistended, bowel sounds x 4  Ext: edema decreasing  Skin: dry, no rash  Neuro: aaox3    MEDS (scheduled):    chlorothiazide (DIURIL) IVPB  250 mg IntraVENous Q12H    docusate sodium  100 mg Oral Daily    tamsulosin  0.4 mg Oral Daily    insulin lispro  0-6 Units SubCUTAneous TID WC    insulin lispro  0-3 Units SubCUTAneous Nightly    insulin glargine-yfgn  45 Units SubCUTAneous Nightly    epoetin roly-epbx  3,000 Units SubCUTAneous Once per day on Mon Wed Fri    apixaban  2.5 mg Oral BID    ipratropium-albuterol  1 vial Nebulization TID    isosorbide mononitrate  30 mg Oral Daily    metoprolol succinate  50 mg Oral Daily    pantoprazole  40 mg Oral QAM AC    valsartan  40 mg Oral BID    sodium chloride flush  5-40 mL IntraVENous 2 times per day       MEDS (infusions):   dextrose      bumetanide 0.1 mg/mL infusion 1 mg/hr (02/05/22 0749)    sodium chloride         MEDS (prn):  glucose, dextrose, glucagon (rDNA), dextrose, aluminum & magnesium hydroxide-simethicone, technetium sestamibi, sodium chloride flush, sodium chloride, ondansetron **OR** ondansetron, polyethylene glycol, acetaminophen **OR** acetaminophen, perflutren lipid microspheres, albuterol    DATA:    Recent Labs     02/03/22  0447 02/04/22  0517 02/05/22  0502   WBC 6.6 7.3 7.4   HGB 10.1* 11.0* 11.3*   HCT 32.6* 35.5* 36.0*   MCV 97.9 96.2 94.7   PLT 341 406 420     Recent Labs     02/03/22  0447 02/04/22  0517 02/05/22  0502    139 136   K 3.6 3.8 3.7   CL 94* 94* 91*   CO2 32* 32* 32*   BUN 40* 40* 49*   CREATININE 2.0* 2.0* 2.2*   LABGLOM 32 32 29   GLUCOSE 122* 125* 127*   CALCIUM 9.2 9.0 9.4   ALT 7 8 8   AST 12 14 12   BILITOT 1.4* 1.6* 1.8*   ALKPHOS 145* 154* 154*       Lab Results   Component Value Date    LABALBU 3.8 02/05/2022    LABALBU 3.6 02/04/2022    LABALBU 3.6 02/03/2022     Lab Results   Component Value Date    TSH 0.548 12/12/2015       Iron Studies  Lab Results   Component Value Date    IRON 51 (L) 01/26/2022    TIBC 271 01/26/2022    FERRITIN 89 01/27/2022     Vitamin B-12   Date Value Ref Range Status   01/27/2022 349 211 - 946 pg/mL Final     Folate   Date Value Ref Range Status   01/27/2022 11.8 4.8 - 24.2 ng/mL Final       No results found for: VITD25  PTH   Date Value Ref Range Status   01/28/2022 78 (H) 15 - 65 pg/mL Final       No components found for: URIC    No results found for: VOL, APPEARANCE, COLORU, LABSPEC, LABPH, LEUKBLD, NITRU, GLUCOSEU, KETUA, UROBILINOGEN, KETUA, UROBILINOGEN, BILIRUBINUR, OCBU    No results found for: LIPIDPAN      IMPRESSION/RECOMMENDATIONS:      1. arf on ckd 3b  Baseline cr 1.8 from 5/2017  Cr 2.1>2>1.8>2  In setting of decompensated heart failure  Ran out of home meds  Follow on the diovan  Cr is near baseline     2. Acute decompensated diastolic heart failure  H/o mvp mod mr and mod p htn  bumex drip started 1/28  Card seeing  uo -3.7L  -15L overall  Wt 245>232 lbs  Will give diuril x3 to speed up diuresis     3. H/o cad sp acb  Per cardiology     4. HTN  Follow on diovan, toprol     5. Anemia  Dose monique if hgb <10  b12 fol fe normal        Michelle Cowman, MD

## 2022-02-06 LAB
ALBUMIN SERPL-MCNC: 3.9 G/DL (ref 3.5–5.2)
ALP BLD-CCNC: 170 U/L (ref 40–129)
ALT SERPL-CCNC: 8 U/L (ref 0–40)
ANION GAP SERPL CALCULATED.3IONS-SCNC: 19 MMOL/L (ref 7–16)
AST SERPL-CCNC: 15 U/L (ref 0–39)
BASOPHILS ABSOLUTE: 0.06 E9/L (ref 0–0.2)
BASOPHILS RELATIVE PERCENT: 0.7 % (ref 0–2)
BILIRUB SERPL-MCNC: 1.9 MG/DL (ref 0–1.2)
BUN BLDV-MCNC: 61 MG/DL (ref 6–23)
CALCIUM SERPL-MCNC: 9.7 MG/DL (ref 8.6–10.2)
CHLORIDE BLD-SCNC: 87 MMOL/L (ref 98–107)
CO2: 29 MMOL/L (ref 22–29)
CREAT SERPL-MCNC: 2.7 MG/DL (ref 0.7–1.2)
EOSINOPHILS ABSOLUTE: 0.59 E9/L (ref 0.05–0.5)
EOSINOPHILS RELATIVE PERCENT: 7.2 % (ref 0–6)
GFR AFRICAN AMERICAN: 28
GFR NON-AFRICAN AMERICAN: 23 ML/MIN/1.73
GLUCOSE BLD-MCNC: 128 MG/DL (ref 74–99)
HCT VFR BLD CALC: 38.8 % (ref 37–54)
HEMOGLOBIN: 12.4 G/DL (ref 12.5–16.5)
IMMATURE GRANULOCYTES #: 0.05 E9/L
IMMATURE GRANULOCYTES %: 0.6 % (ref 0–5)
LYMPHOCYTES ABSOLUTE: 1.46 E9/L (ref 1.5–4)
LYMPHOCYTES RELATIVE PERCENT: 17.8 % (ref 20–42)
MCH RBC QN AUTO: 30.3 PG (ref 26–35)
MCHC RBC AUTO-ENTMCNC: 32 % (ref 32–34.5)
MCV RBC AUTO: 94.9 FL (ref 80–99.9)
METER GLUCOSE: 132 MG/DL (ref 74–99)
METER GLUCOSE: 137 MG/DL (ref 74–99)
METER GLUCOSE: 143 MG/DL (ref 74–99)
METER GLUCOSE: 214 MG/DL (ref 74–99)
METER GLUCOSE: 225 MG/DL (ref 74–99)
MONOCYTES ABSOLUTE: 1.05 E9/L (ref 0.1–0.95)
MONOCYTES RELATIVE PERCENT: 12.8 % (ref 2–12)
NEUTROPHILS ABSOLUTE: 4.97 E9/L (ref 1.8–7.3)
NEUTROPHILS RELATIVE PERCENT: 60.9 % (ref 43–80)
PDW BLD-RTO: 15.3 FL (ref 11.5–15)
PLATELET # BLD: 491 E9/L (ref 130–450)
PMV BLD AUTO: 9 FL (ref 7–12)
POTASSIUM REFLEX MAGNESIUM: 3.7 MMOL/L (ref 3.5–5)
RBC # BLD: 4.09 E12/L (ref 3.8–5.8)
SODIUM BLD-SCNC: 135 MMOL/L (ref 132–146)
TOTAL PROTEIN: 8.3 G/DL (ref 6.4–8.3)
WBC # BLD: 8.2 E9/L (ref 4.5–11.5)

## 2022-02-06 PROCEDURE — 6370000000 HC RX 637 (ALT 250 FOR IP): Performed by: FAMILY MEDICINE

## 2022-02-06 PROCEDURE — 85025 COMPLETE CBC W/AUTO DIFF WBC: CPT

## 2022-02-06 PROCEDURE — 82962 GLUCOSE BLOOD TEST: CPT

## 2022-02-06 PROCEDURE — 80053 COMPREHEN METABOLIC PANEL: CPT

## 2022-02-06 PROCEDURE — 6370000000 HC RX 637 (ALT 250 FOR IP): Performed by: INTERNAL MEDICINE

## 2022-02-06 PROCEDURE — 6370000000 HC RX 637 (ALT 250 FOR IP): Performed by: STUDENT IN AN ORGANIZED HEALTH CARE EDUCATION/TRAINING PROGRAM

## 2022-02-06 PROCEDURE — 94640 AIRWAY INHALATION TREATMENT: CPT

## 2022-02-06 PROCEDURE — 2580000003 HC RX 258: Performed by: FAMILY MEDICINE

## 2022-02-06 PROCEDURE — 2060000000 HC ICU INTERMEDIATE R&B

## 2022-02-06 PROCEDURE — 36415 COLL VENOUS BLD VENIPUNCTURE: CPT

## 2022-02-06 RX ORDER — TAMSULOSIN HYDROCHLORIDE 0.4 MG/1
0.4 CAPSULE ORAL DAILY
Qty: 30 CAPSULE | Refills: 3 | Status: SHIPPED | OUTPATIENT
Start: 2022-02-07

## 2022-02-06 RX ORDER — BUMETANIDE 1 MG/1
1 TABLET ORAL DAILY
Qty: 30 TABLET | Refills: 0 | Status: SHIPPED | OUTPATIENT
Start: 2022-02-09

## 2022-02-06 RX ORDER — METOPROLOL SUCCINATE 50 MG/1
50 TABLET, EXTENDED RELEASE ORAL DAILY
Qty: 30 TABLET | Refills: 3 | Status: SHIPPED | OUTPATIENT
Start: 2022-02-07

## 2022-02-06 RX ADMIN — SODIUM CHLORIDE, PRESERVATIVE FREE 10 ML: 5 INJECTION INTRAVENOUS at 20:54

## 2022-02-06 RX ADMIN — VALSARTAN 40 MG: 80 TABLET, FILM COATED ORAL at 20:54

## 2022-02-06 RX ADMIN — PANTOPRAZOLE SODIUM 40 MG: 40 TABLET, DELAYED RELEASE ORAL at 05:21

## 2022-02-06 RX ADMIN — APIXABAN 2.5 MG: 2.5 TABLET, FILM COATED ORAL at 08:23

## 2022-02-06 RX ADMIN — TAMSULOSIN HYDROCHLORIDE 0.4 MG: 0.4 CAPSULE ORAL at 08:23

## 2022-02-06 RX ADMIN — ISOSORBIDE MONONITRATE 30 MG: 30 TABLET, EXTENDED RELEASE ORAL at 08:23

## 2022-02-06 RX ADMIN — IPRATROPIUM BROMIDE AND ALBUTEROL SULFATE 3 ML: .5; 2.5 SOLUTION RESPIRATORY (INHALATION) at 13:43

## 2022-02-06 RX ADMIN — ACETAMINOPHEN 650 MG: 325 TABLET ORAL at 08:23

## 2022-02-06 RX ADMIN — VALSARTAN 40 MG: 80 TABLET, FILM COATED ORAL at 08:23

## 2022-02-06 RX ADMIN — APIXABAN 2.5 MG: 2.5 TABLET, FILM COATED ORAL at 20:54

## 2022-02-06 RX ADMIN — IPRATROPIUM BROMIDE AND ALBUTEROL SULFATE 3 ML: .5; 2.5 SOLUTION RESPIRATORY (INHALATION) at 08:31

## 2022-02-06 RX ADMIN — DOCUSATE SODIUM 100 MG: 100 CAPSULE, LIQUID FILLED ORAL at 08:23

## 2022-02-06 RX ADMIN — IPRATROPIUM BROMIDE AND ALBUTEROL SULFATE 3 ML: .5; 2.5 SOLUTION RESPIRATORY (INHALATION) at 20:11

## 2022-02-06 RX ADMIN — SODIUM CHLORIDE, PRESERVATIVE FREE 10 ML: 5 INJECTION INTRAVENOUS at 08:24

## 2022-02-06 RX ADMIN — METOPROLOL SUCCINATE 50 MG: 50 TABLET, FILM COATED, EXTENDED RELEASE ORAL at 08:23

## 2022-02-06 ASSESSMENT — PAIN SCALES - WONG BAKER
WONGBAKER_NUMERICALRESPONSE: 0
WONGBAKER_NUMERICALRESPONSE: 4
WONGBAKER_NUMERICALRESPONSE: 4

## 2022-02-06 ASSESSMENT — PAIN SCALES - GENERAL
PAINLEVEL_OUTOF10: 0
PAINLEVEL_OUTOF10: 0
PAINLEVEL_OUTOF10: 4
PAINLEVEL_OUTOF10: 0

## 2022-02-06 ASSESSMENT — PAIN DESCRIPTION - PAIN TYPE: TYPE: ACUTE PAIN

## 2022-02-06 ASSESSMENT — PAIN DESCRIPTION - LOCATION: LOCATION: HEAD

## 2022-02-06 NOTE — PROGRESS NOTES
Hospitalist Progress Note      SYNOPSIS: Patient admitted on 2022 for <principal problem not specified>  Mr. Vira Sanchez, a 80y.o. year old male  who  has a past medical history of CAD (coronary artery disease), CHF (congestive heart failure) ([facility] [address] Chronic atrial fibrillation ([facility] [address] Chronic kidney disease, stage III (moderate) ([facility] [address] COPD (chronic obstructive pulmonary disease) ([facility] [address] Diabetes mellitus ([facility] [address] Essential hypertension, GERD (gastroesophageal reflux disease), H. pylori infection, Hx of degenerative disc disease, Hyperlipidemia, MI (myocardial infarction) ([facility] [address] Osteoarthritis, PFO (patent foramen ovale), Popliteal cyst, and Pulmonary hypertension ([facility] [address]    Patient presented to the emergency department with complaints of shortness of breath and swelling of the legs. Also notes weight gain. Denies fever, chills, nausea, vomiting, chest pain, abdominal pain. Shortness of breath is worse with exertion. Has been out of his Lasix for the last 2 weeks. Vital signs within normal limits and stable. Currently 98% on 2 L nasal cannula. Laboratory studies reveal BUN 38, creatinine 2.1, proBNP 6407, troponin of 50, hemoglobin 10.0. Chest x-ray shows stable cardiomegaly. Bibasilar crackles on exam.  Patient given Lasix. Medicine consulted for admission.          SUBJECTIVE:  Pt was seen at bedside,  no shortness of breath at this time. Continues to diurese with Bumex drip per nephrology,. Good UO  He denies any chest pain today    Stable overnight. No other overnight issues reported. Temp (24hrs), Av.1 °F (36.7 °C), Min:97.9 °F (36.6 °C), Max:98.3 °F (36.8 °C)    DIET: ADULT DIET; Regular;  Low Sodium (2 gm)  CODE: Full Code    Intake/Output Summary (Last 24 hours) at 2022 1129  Last data filed at 2022 7042  Gross per 24 hour   Intake 120 ml   Output 1725 ml   Net -1605 ml       OBJECTIVE:    BP (!) 102/56   Pulse 79   Temp 98.2 °F (36.8 °C) (Oral)   Resp 18   Ht 5' 9\" (1.753 m)   Wt 232 lb 9.6 oz (105.5 kg)   SpO2 92%   BMI 34.35 kg/m²     General appearance: No apparent distress, appears stated age and cooperative. HEENT:  Conjunctivae/corneas clear. Neck: Supple. No jugular venous distention. Respiratory: Clear to auscultation bilaterally, normal respiratory effort  Cardiovascular: Regular rate rhythm, normal S1-S2. Bilateral lower extremity edema with superficial wounds  Abdomen: Soft, nontender, nondistended  Musculoskeletal: No clubbing, cyanosis, no bilateral lower extremity edema. Some posterior thigh edema b/l. Brisk capillary refill. Neurologic: awake, alert and following commands     Assessment and plan:    #Acute on chronic diastolic CHF:  -IV  Bumex drip per cardiology and nephrology, Good urine output  -BNP ordered today-10,291  Continue beta-blockers and valsartan  -2D echo EF-45 to 50%  Cardiology input appreciated     #CAD-CABG at [facility] 2007/Elevated troponin:  -Nuclear stress test ordered by cardiology came back abnormal January 27  Continue beta-blocker Imdur  -Management per cardiology     #Valvular heart disease    -Hx of MVP with moderate MR and moderate PH.   -Echo EF 45 to 50%     #Chronic Afib:   -Rate controlled resume metoprolol and Eliquis    #Mild acute kidney injury  -Cardiorenal syndrome  -Improved with diuresis    #CKD stage III B  -Avoid nephrotoxic agent  -Consult nephrology while diuresing the patient input appreciated. Serum creatinine is stable at 1.8-2    #Hypertension  -Controlled     #Dyslipidemia     #Type 2 diabetes mellitus  -Held glipizide and Decreased Lantus to 45 unit due to hypoglycemia  -A1c 6.1  - currently stable      #COPD without exacerbation     #Anemia of chronic kidney disease    #History of GI bleed  -Monitor hemoglobin, currently at 10.1    #DVT prophylaxis patient already on Eliquis.       DISPOSITION: currently on IV diuresis-possible discharge after switching to oral diuresis based on nephrology/cardiology recommendations    Medications:  REVIEWED DAILY    Infusion Medications    dextrose      sodium chloride       Scheduled Medications    docusate sodium  100 mg Oral Daily    tamsulosin  0.4 mg Oral Daily    insulin lispro  0-6 Units SubCUTAneous TID WC    insulin lispro  0-3 Units SubCUTAneous Nightly    insulin glargine-yfgn  45 Units SubCUTAneous Nightly    epoetin roly-epbx  3,000 Units SubCUTAneous Once per day on Mon Wed Fri    apixaban  2.5 mg Oral BID    ipratropium-albuterol  1 vial Nebulization TID    isosorbide mononitrate  30 mg Oral Daily    metoprolol succinate  50 mg Oral Daily    pantoprazole  40 mg Oral QAM AC    valsartan  40 mg Oral BID    sodium chloride flush  5-40 mL IntraVENous 2 times per day     PRN Meds: glucose, dextrose, glucagon (rDNA), dextrose, aluminum & magnesium hydroxide-simethicone, technetium sestamibi, sodium chloride flush, sodium chloride, ondansetron **OR** ondansetron, polyethylene glycol, acetaminophen **OR** acetaminophen, perflutren lipid microspheres, albuterol    Labs:     Recent Labs     02/04/22 0517 02/05/22  0502 02/06/22  0535   WBC 7.3 7.4 8.2   HGB 11.0* 11.3* 12.4*   HCT 35.5* 36.0* 38.8    420 491*       Recent Labs     02/04/22 0517 02/05/22  0502 02/06/22  0535    136 135   K 3.8 3.7 3.7   CL 94* 91* 87*   CO2 32* 32* 29   BUN 40* 49* 61*   CREATININE 2.0* 2.2* 2.7*   CALCIUM 9.0 9.4 9.7       Recent Labs     02/04/22 0517 02/05/22  0502 02/06/22  0535   PROT 7.9 8.0 8.3   ALKPHOS 154* 154* 170*   ALT 8 8 8   AST 14 12 15   BILITOT 1.6* 1.8* 1.9*       No results for input(s): INR in the last 72 hours. No results for input(s): Sonjia Randleman in the last 72 hours.     Chronic labs:    Lab Results   Component Value Date    CHOL 111 01/26/2022    TRIG 51 01/26/2022    HDL 36 01/26/2022    LDLCALC 65 01/26/2022    TSH 0.548 12/12/2015    INR 1.5 01/25/2022    LABA1C 6.1 (H) 01/26/2022       Radiology: REVIEWED DAILY    +++++++++++++++++++++++++++++++++++++++++++++++++  Heber Fuller MD  [address], [place]  +++++++++++++++++++++++++++++++++++++++++++++++++  NOTE: This report was transcribed using voice recognition software. Every effort was made to ensure accuracy; however, inadvertent computerized transcription errors may be present.

## 2022-02-06 NOTE — DISCHARGE SUMMARY
Hospital Medicine Discharge Summary    Patient ID: Raynald [place]      Patient's PCP: No primary care provider on file. Admit Date: 1/25/2022     Discharge Date:   2/6/2022    Admitting Physician: Graeme Traore DO     Discharge Physician: Dominique Lombardi MD     Discharge Condition: Stable    Discharge Diagnoses: Active Hospital Problems    Diagnosis Date Noted    Heart failure ([address] [I50.9] 01/25/2022   Acute heart failure exacerbation  Coronary artery disease-status post CABG-2007  Valvular heart disease  Chronic atrial fibrillation  Mild SAMM with history of CKD stage IIIb  COPD  Anemia of chronic disease  History of GI bleed    The patient was seen and examined on day of discharge and this discharge summary is in conjunction with any daily progress note from day of discharge. Hospital Course:   Patient admitted on 1/25/2022 for <principal problem not specified>  Mr. Dario Rodriges, N 37 y.o. year old [de-identified]  has a past medical history of CAD (coronary artery disease), CHF (congestive heart failure) ([address] Chronic atrial fibrillation (H[facility]), Chronic kidney disease, stage III (moderate) ([address] COPD (chronic obstructive pulmonary disease) ([address] Diabetes mellitus ([address] Essential hypertension, GERD (gastroesophageal reflux disease), H. pylori infection, Hx of degenerative disc disease, Hyperlipidemia, MI (myocardial infarction) ([address] Osteoarthritis, PFO (patent foramen ovale), Popliteal cyst, and Pulmonary hypertension ([address]       Patient presented to the emergency department with complaints of shortness of breath and swelling of the legs.  Also notes weight gain.  Denies fever, chills, nausea, vomiting, chest pain, abdominal pain.  Shortness of breath is worse with exertion.  Has been out of his Lasix for the last 2 weeks.  Vital signs within normal limits and stable.  Currently 98% on 2 L nasal cannula.  Laboratory studies reveal BUN 38, creatinine 2.1, proBNP 6407, troponin of 50, hemoglobin 10.0.  Chest x-ray shows stable cardiomegaly.  Bibasilar crackles on exam.  Patient given Lasix.  Medicine consulted for admission    Cardiology and nephrology were consulted during admission. 2D echo showed an ejection fraction of 45 to 50% nuclear stress test was ordered which showed no ischemic findings-1/27/2022. Medical management per cardiology. Cardiorenal syndrome-was on Bumex drip until 2/6/2022. Patient improved with respect to his pedal edema and abdominal wall edema. He was on a sliding scale for his diabetes mellitus along with his home dose of Lantus. Hemoglobin was stable through the admission. Patient was deemed stable for discharge from cardiology and nephrology standpoint. Start on bumex 1mg from wednesday          Exam:     /66   Pulse 74   Temp 98 °F (36.7 °C) (Oral)   Resp 18   Ht 5' 9\" (1.753 m)   Wt 232 lb 9.6 oz (105.5 kg)   SpO2 95%   BMI 34.35 kg/m²     General appearance: No apparent distress, appears stated age and cooperative. HEENT: Pupils equal, round, and reactive to light. Conjunctivae/corneas clear. Neck: Supple, with full range of motion. No jugular venous distention. Trachea midline. Respiratory:  Normal respiratory effort. Clear to auscultation, bilaterally without Rales/Wheezes/Rhonchi. Cardiovascular: Regular rate and rhythm with normal S1/S2 without murmurs, rubs or gallops. Abdomen: Soft, non-tender, non-distended with normal bowel sounds. Musculoskeletal: No clubbing, cyanosis or edema bilaterally. Full range of motion without deformity. Skin: Skin color, texture, turgor normal.  No rashes or lesions. Neurologic:  Neurovascularly intact without any focal sensory/motor deficits.  Cranial nerves: II-XII intact, grossly non-focal.  Psychiatric: Alert and oriented, thought content appropriate, normal insight      Consults:     IP CONSULT TO INTERNAL MEDICINE  IP CONSULT TO HEART FAILURE NURSE/COORDINATOR  IP CONSULT TO DIETITIAN  IP CONSULT TO CARDIOLOGY  IP CONSULT TO NEPHROLOGY    Significant Diagnostic Studies:       Imaging:  Echo Complete    Result Date: 1/29/2022  Transthoracic Echocardiography Report (TTE)  Demographics   Patient Name    Russell Payne  Gender            Male                  M   Medical Record  [id number]     Room Number       6208  Number   Account #       [de-identified]    Procedure Date    01/29/2022   Corporate ID                 Ordering                               Physician   Accession       [id number]   Referring  Number                       Physician   Date of Birth   1940   Sonographer       [place] [facility]   Age             80 year(s)   Interpreting      [address]                               Physician         Physician Cardiology                                                 Madan León MD                                Any Other  Procedure Type of Study   TTE procedure:Echo Complete W/Doppler & Color Flow. Procedure Date Date: 01/29/2022 Start: 08:42 AM Study Location: Portable Technical Quality: Adequate visualization Indications:Congestive heart failure. Patient Status: Routine Height: 69 inches Weight: 245 pounds BSA: 2.25 m^2 BMI: 36.18 kg/m^2 Rhythm: Within normal limits HR: 85 bpm BP: 120/64 mmHg  Findings   Left Ventricle  Left ventricle size is normal.  Ejection fraction is visually estimated at 45-50%. Overall ejection fraction mildly decreased . Mild concentric left ventricular hypertrophy. Stage III diastolic dysfunction. Right Ventricle  Normal right ventricular size. Right ventricle global systolic function is moderately reduced . TAPSE 12  mm. Left Atrium  The left atrium is severely dilated. No evidence of patent foramen ovale. Right Atrium  Mildly enlarged right atrium size. Mitral Valve  Mild mitral annular calcification. S/p Kwpg-ry-Zjik repair of the mitral valve with well seated MitraClip. Mitral valve mean gradient 5 mmHg.   Mild mitral regurgitation with posteriorly directed jet. Tricuspid Valve  The tricuspid valve appears structurally normal.  Physiologic and/or trace tricuspid regurgitation. RVSP is 41 mmHg. Pulmonary hypertension is mild . Aortic Valve  The aortic valve appears mildly sclerotic. The aortic valve is trileaflet. No hemodynamically significant aortic stenosis is present. BELLO by direct  planimetry is 3.9 cm2. No evidence of aortic valve regurgitation. Pulmonic Valve  Pulmonic valve is structurally normal.  Physiologic and/or trace pulmonic regurgitation present. No evidence of pulmonic valve stenosis. Pericardial Effusion  No evidence for hemodynamically significant pericardial effusion. Pleural Effusion  No evidence of pleural effusion. Aorta  Normal aortic root and ascending aorta. Miscellaneous  Dilated Inferior Vena Cava. Inferior Vena Cava, normal respiratory variation. Conclusions   Summary  Left ventricle size is normal.  Ejection fraction is visually estimated at 45-50%. Overall ejection fraction mildly decreased . Mild concentric left ventricular hypertrophy. Stage III diastolic dysfunction. The left atrium is severely dilated. Normal right ventricular size. Right ventricle global systolic function is moderately reduced . TAPSE 12  mm. Mild mitral annular calcification. S/p Jzsh-ps-Xbtn repair of the mitral valve with well seated MitraClip. Mitral valve mean gradient 5 mmHg. Mild mitral regurgitation with posteriorly directed jet. No hemodynamically significant aortic stenosis is present. BELLO by direct  planimetry is 3.9 cm2. RVSP is 41 mmHg. Pulmonary hypertension is mild . No evidence for hemodynamically significant pericardial effusion.    Signature   ----------------------------------------------------------------  Electronically signed by Brennen Khan MD(Interpreting  physician) on 01/29/2022 08:56 PM  ----------------------------------------------------------------  M-Mode/2D Measurements & Calculations   LV Diastolic     LV Systolic Dimension: 3.2 cm   AV Cusp Separation: 1.8  Dimension: 4.4   LV Volume Diastolic: 03.2 ml    cmAO Root Dimension: 3.1  cm               LV Volume Systolic: 20.2 ml     cm  LV FS:27.3 %     LV EDV/LV EDV Index: 88.8 QC/74  LV PW Diastolic: WK/L^5PW ESV/LV ESV Index: 42.2  1.6 cm           ml/19ml/ m^2  LV PW Systolic:  EF Calculated: 52.5 %  1.9 cm           LV Mass Index: 119 l/min*m^2  Septum           LV Length: 8.8 cm               Ascending Aorta: 2.7 cm  Diastolic: 1.4                                   LA volume/Index: 129 ml  cm               LVOT: 2.1 cm                    /19.00OY/W^9  Septum Systolic:                                 RA Area: 22.2 cm^2  1.5 cm  CO: 4.21 l/min  CI: 1.87 l/m*m^2  LV Mass: 266.87  g  Doppler Measurements & Calculations   MV Peak E-Wave: 1.65 AV Peak Velocity:     LVOT Peak Velocity: 0.83 m/s  m/s                  1.32 m/s              LVOT Mean Velocity: 0.54 m/s  MV Peak A-Wave: 0.36 AV Peak Gradient:     LVOT Peak Gradient: 2.7  m/s                  6.95 mmHg             mmHgLVOT Mean Gradient: 1.4  MV E/A Ratio: 4.55   AV Mean Velocity:     mmHg  MV Peak Gradient:    0.97 m/s  18.3 mmHg            AV Mean Gradient: 4.1  MV Mean Gradient: 5  mmHg  mmHg                 AV VTI: 25.2 cm       TR Velocity:2.87 m/s  MV Mean Velocity:    AV Area               TR Gradient:33.04 mmHg  0.94 m/s             (Continuity):1.96  MV Deceleration      cm^2  Time: 207.7 msec  MV P1/2t: 45.6 msec  LVOT VTI: 14.3 cm     MA ED Velocity: 0.92 m/s  MVA by PHT:4.82 cm^2  MV Area  (continuity): 1.3  cm^2  MV E' Septal  Velocity: 0.06 m/s  MV E' Lateral  Velocity: 8 m/s  [email or web address] DocKey=[id number]    XR CHEST PORTABLE    Result Date: 1/25/2022  EXAMINATION: ONE XRAY VIEW OF THE CHEST 1/25/2022 7:05 pm COMPARISON: 5/11/7 HISTORY: ORDERING SYSTEM PROVIDED HISTORY: sob/leg edema TECHNOLOGIST PROVIDED HISTORY: Reason for exam:->sob/leg edema What reading provider will be dictating this exam?->CRC FINDINGS: The lungs are without acute focal process. There is no effusion or pneumothorax. Stable cardiomegaly. The osseous structures are without acute process. Sternotomy wires noted. IMPRESSION: No acute process. Stable cardiomegaly. No acute findings. NM Cardiac Stress Test Nuclear Imaging    Result Date: 1/27/2022  Indication:  Shortness of breath Clinical History:   Patient has known historyof coronary artery disease. IMAGING: Myocardial perfusion imaging was performed at rest 30-35 minutes following the intravenous injection of 12 mCi of (Tc-Sestamibi) followed by 10 ml of Normal Saline. At peak exercise, the patient was injected intravenously with 32mCi of (Tc-Sestamibi) followed by 10 ml of Normal Saline. Gated post-stress tomographic imaging was performed 20-25 minutes after stress. FINDINGS: The overall quality of the study was fair. Left ventricular cavity size was noted to be normal. Rotational analog analysis demonstrated significant soft tissue attenuation. There is a large-sized, severe intensity fixed defect of the inferior wall. There is also a moderate-sized, moderate intensity partially reversible defect of the anterior. Gated SPECT left ventricular ejection fraction was calculated to be 62%, with inferior hypokinesis. The myocardial perfusion imaging was abnormal with a large-sized, severe intensity fixed defect of the inferior wall. There is also a moderate-sized, moderate intensity partially reversible defect of the anterior. Overall left ventricular systolic function was normal with wall motion abnormality. Intermediate risk study.          Disposition:  home     Discharge Instructions/Follow-up:    Watch fluid intake closely    Code Status:  Full Code     Activity: activity as tolerated    Diet: cardiac diet and diabetic diet    Labs: For convenience and continuity at follow-up the following most recent labs are provided:      CBC:    Lab Results   Component Value Date    WBC 8.2 02/06/2022    HGB 12.4 02/06/2022    HCT 38.8 02/06/2022     02/06/2022       Renal:    Lab Results   Component Value Date     02/06/2022    K 3.7 02/06/2022    CL 87 02/06/2022    CO2 29 02/06/2022    BUN 61 02/06/2022    CREATININE 2.7 02/06/2022    CALCIUM 9.7 02/06/2022    PHOS 3.7 01/28/2022       Discharge Medications:     Current Discharge Medication List           Details   tamsulosin (FLOMAX) 0.4 MG capsule Take 1 capsule by mouth daily  Qty: 30 capsule, Refills: 3      bumetanide (BUMEX) 1 MG tablet Take 1 tablet by mouth daily  Qty: 30 tablet, Refills: 0              Details   apixaban (ELIQUIS) 2.5 MG TABS tablet Take 1 tablet by mouth 2 times daily  Qty: 60 tablet, Refills: 0      metoprolol succinate (TOPROL XL) 50 MG extended release tablet Take 1 tablet by mouth daily  Qty: 30 tablet, Refills: 3              Details   isosorbide mononitrate (IMDUR) 30 MG extended release tablet Take 30 mg by mouth daily      nitroGLYCERIN (NITROSTAT) 0.4 MG SL tablet up to max of 3 total doses. If no relief after 1 dose, call 911.   Qty: 25 tablet, Refills: 3      acetaminophen (TYLENOL) 325 MG tablet Take 325 mg by mouth 4 times daily as needed for Pain      diphenhydrAMINE (BENADRYL) 25 MG capsule Take 25 mg by mouth as needed for Itching (itchy hands and feet)      albuterol sulfate HFA (PROAIR HFA) 108 (90 BASE) MCG/ACT inhaler Inhale 2 puffs into the lungs 4 times daily as needed for Wheezing or Shortness of Breath      ipratropium-albuterol (DUONEB) 0.5-2.5 (3) MG/3ML SOLN nebulizer solution Take 1 vial by nebulization 3 times daily      gabapentin (NEURONTIN) 100 MG capsule Take 200 mg by mouth 3 times daily      terazosin (HYTRIN) 2 MG capsule Take 2 mg by mouth nightly      valsartan (DIOVAN) 40 MG tablet Take 40 mg by mouth 2 times daily      amLODIPine (NORVASC) 10 MG tablet Take 10 mg by mouth daily       ergocalciferol (ERGOCALCIFEROL) 56037 UNITS capsule Take 50,000 Units by mouth Every 4 weeks      omeprazole (PRILOSEC) 20 MG capsule Take 40 mg by mouth Daily       insulin glargine (LANTUS) 100 UNIT/ML injection vial Inject 60 Units into the skin nightly      Multiple Vitamins-Minerals (THERAPEUTIC MULTIVITAMIN-MINERALS) tablet Take 1 tablet by mouth daily      aspirin 81 MG EC tablet Take 81 mg by mouth daily      glipiZIDE (GLUCOTROL) 5 MG tablet Take 5 mg by mouth 2 times daily (with meals)             Time Spent on discharge is more than 45 minutes in the examination, evaluation, counseling and review of medications and discharge plan. Signed:    Sylvester Vallejo MD   2/6/2022      Thank you No primary care provider on file. for the opportunity to be involved in this patient's care. If you have any questions or concerns please feel free to contact me at [phone].

## 2022-02-06 NOTE — PROGRESS NOTES
[facility]  Nephrology Attending Progress Note          SUBJECTIVE:     1/26/22: The pt is an 81 yo male with a pmh of cad sp acb, dm, copd, htn, pfo,  hyperlipidemia, ckd , afib, p htn who presented with sob and swelling and weight gain. He ran out of his lasix 2 months ago. Labs show cr of 2.1, k 4.7, co2 27, bun 38, cr 2.1, ca 8.7, wbc 7.9, hgb 10.2, plt 347. Baseline cr is 1.8 from 5/2017. He has been started on iv lasix q 12. He is on diovan as an outpt. [facility].     1/27: pt seen in room, no cp or sob    1/28: Denies shortness of breath; leg edema essentially unchanged, severe    1/29: bumex drip started 1/28; no UO recorded; he has been using commode    1/30: He denies shortness of breath; he thinks his leg edema is decreasing    1/31: pt seen in room. Edema improving    2/1: pt seen in room, feels better, eating ok, no sob    2/2: pt seen in room ,on bumex drip. 2/3: pt complaining of swelling in thighs and abd    2/4: pt seen in room, feels thighs and abd still swollen    2/5: pt seen in room, no cp or sob    2/6: pt seen in room, no sob. Feels edema improving slowly in thighs and abd    PROBLEM LIST:    Patient Active Problem List   Diagnosis    Diabetes mellitus ([address]    CAD (coronary artery disease)    COPD exacerbation ([address]    Morbid obesity due to excess calories ([address]    CHF exacerbation ([address]    Essential hypertension    Pure hypercholesterolemia    Acute on chronic diastolic congestive heart failure (HCC)    Chronic a-fib (HCC)    Chronic kidney disease, stage III (moderate) (HCC)    Heart failure (HCC)    Leg swelling          DIET:    ADULT DIET; Regular;  Low Sodium (2 gm)     PHYSICAL EXAM:     Patient Vitals for the past 24 hrs:   BP Temp Temp src Pulse Resp SpO2   02/06/22 0700 (!) 102/56 98.2 °F (36.8 °C) Oral 79 18 92 %   02/06/22 0341 110/68 98 °F (36.7 °C) Oral 70 16 95 %   02/05/22 2008 112/68 98.3 °F (36.8 °C) Oral 72 16 95 %   02/05/22 1529 107/71 97.9 °F (36.6 °C) Oral 75 16 94 %   @      Intake/Output Summary (Last 24 hours) at 2/6/2022 1029  Last data filed at 2/6/2022 0363  Gross per 24 hour   Intake 120 ml   Output 1725 ml   Net -1605 ml         Wt Readings from Last 3 Encounters:   01/28/22 232 lb 9.6 oz (105.5 kg)   02/11/20 223 lb (101.2 kg)   03/24/19 216 lb (98 kg)       Constitutional:  Pt is in no acute distress  Head: normocephalic, atraumatic  Neck: no JVD  Cardiovascular: regular rate and rhythm, no murmurs, gallops, or rubs  Respiratory:  No rales, rhochi, or wheezes  Gastrointestinal:  Soft, nontender, nondistended, bowel sounds x 4  Ext: edema decreasing  Skin: dry, no rash  Neuro: aaox3    MEDS (scheduled):    docusate sodium  100 mg Oral Daily    tamsulosin  0.4 mg Oral Daily    insulin lispro  0-6 Units SubCUTAneous TID WC    insulin lispro  0-3 Units SubCUTAneous Nightly    insulin glargine-yfgn  45 Units SubCUTAneous Nightly    epoetin roly-epbx  3,000 Units SubCUTAneous Once per day on Mon Wed Fri    apixaban  2.5 mg Oral BID    ipratropium-albuterol  1 vial Nebulization TID    isosorbide mononitrate  30 mg Oral Daily    metoprolol succinate  50 mg Oral Daily    pantoprazole  40 mg Oral QAM AC    valsartan  40 mg Oral BID    sodium chloride flush  5-40 mL IntraVENous 2 times per day       MEDS (infusions):   dextrose      bumetanide 0.1 mg/mL infusion 1 mg/hr (02/05/22 2042)    sodium chloride         MEDS (prn):  glucose, dextrose, glucagon (rDNA), dextrose, aluminum & magnesium hydroxide-simethicone, technetium sestamibi, sodium chloride flush, sodium chloride, ondansetron **OR** ondansetron, polyethylene glycol, acetaminophen **OR** acetaminophen, perflutren lipid microspheres, albuterol    DATA:    Recent Labs     02/04/22  0517 02/05/22  0502 02/06/22  0535   WBC 7.3 7.4 8.2   HGB 11.0* 11.3* 12.4*   HCT 35.5* 36.0* 38.8   MCV 96.2 94.7 94.9    420 491*     Recent Labs     02/04/22  0517 02/05/22  0502 02/06/22  0535    136 135   K 3.8 3.7 3.7   CL 94* 91* 87*   CO2 32* 32* 29   BUN 40* 49* 61*   CREATININE 2.0* 2.2* 2.7*   LABGLOM 32 29 23   GLUCOSE 125* 127* 128*   CALCIUM 9.0 9.4 9.7   ALT 8 8 8   AST 14 12 15   BILITOT 1.6* 1.8* 1.9*   ALKPHOS 154* 154* 170*       Lab Results   Component Value Date    LABALBU 3.9 02/06/2022    LABALBU 3.8 02/05/2022    LABALBU 3.6 02/04/2022     Lab Results   Component Value Date    TSH 0.548 12/12/2015       Iron Studies  Lab Results   Component Value Date    IRON 51 (L) 01/26/2022    TIBC 271 01/26/2022    FERRITIN 89 01/27/2022     Vitamin B-12   Date Value Ref Range Status   01/27/2022 349 211 - 946 pg/mL Final     Folate   Date Value Ref Range Status   01/27/2022 11.8 4.8 - 24.2 ng/mL Final       No results found for: VITD25  PTH   Date Value Ref Range Status   01/28/2022 78 (H) 15 - 65 pg/mL Final       No components found for: URIC    No results found for: VOL, APPEARANCE, COLORU, LABSPEC, LABPH, LEUKBLD, NITRU, GLUCOSEU, KETUA, UROBILINOGEN, KETUA, UROBILINOGEN, BILIRUBINUR, OCBU    No results found for: LIPIDPAN      IMPRESSION/RECOMMENDATIONS:      1. arf on ckd 3b  Baseline cr 1.8 from 5/2017  Cr 2.1>2>1.8>2>2.7 In setting of decompensated heart failure  Ran out of home meds  Follow on the diovan  Stop diuretics now with cr rising     2. 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 H/o cad sp acb  Per cardiology     4. HTN  Follow on diovan, toprol     5. Anemia  Dose monique if hgb <10  b12 fol fe normal    Stop diuretics and can send home on bumex 1 mg po qday starting on wednesday        Keyur Walls MD

## 2022-02-07 VITALS
TEMPERATURE: 97.5 F | WEIGHT: 232.6 LBS | DIASTOLIC BLOOD PRESSURE: 64 MMHG | HEART RATE: 62 BPM | BODY MASS INDEX: 34.45 KG/M2 | HEIGHT: 69 IN | SYSTOLIC BLOOD PRESSURE: 114 MMHG | RESPIRATION RATE: 20 BRPM | OXYGEN SATURATION: 95 %

## 2022-02-07 LAB
ALBUMIN SERPL-MCNC: 3.5 G/DL (ref 3.5–5.2)
ALP BLD-CCNC: 152 U/L (ref 40–129)
ALT SERPL-CCNC: 7 U/L (ref 0–40)
ANION GAP SERPL CALCULATED.3IONS-SCNC: 15 MMOL/L (ref 7–16)
AST SERPL-CCNC: 13 U/L (ref 0–39)
BASOPHILS ABSOLUTE: 0.05 E9/L (ref 0–0.2)
BASOPHILS RELATIVE PERCENT: 0.6 % (ref 0–2)
BILIRUB SERPL-MCNC: 1.4 MG/DL (ref 0–1.2)
BUN BLDV-MCNC: 76 MG/DL (ref 6–23)
CALCIUM SERPL-MCNC: 9.1 MG/DL (ref 8.6–10.2)
CHLORIDE BLD-SCNC: 88 MMOL/L (ref 98–107)
CO2: 30 MMOL/L (ref 22–29)
CREAT SERPL-MCNC: 3.4 MG/DL (ref 0.7–1.2)
EOSINOPHILS ABSOLUTE: 0.51 E9/L (ref 0.05–0.5)
EOSINOPHILS RELATIVE PERCENT: 6.6 % (ref 0–6)
GFR AFRICAN AMERICAN: 21
GFR NON-AFRICAN AMERICAN: 17 ML/MIN/1.73
GLUCOSE BLD-MCNC: 140 MG/DL (ref 74–99)
HCT VFR BLD CALC: 36.7 % (ref 37–54)
HEMOGLOBIN: 11.6 G/DL (ref 12.5–16.5)
IMMATURE GRANULOCYTES #: 0.04 E9/L
IMMATURE GRANULOCYTES %: 0.5 % (ref 0–5)
LYMPHOCYTES ABSOLUTE: 1.31 E9/L (ref 1.5–4)
LYMPHOCYTES RELATIVE PERCENT: 16.9 % (ref 20–42)
MCH RBC QN AUTO: 30.1 PG (ref 26–35)
MCHC RBC AUTO-ENTMCNC: 31.6 % (ref 32–34.5)
MCV RBC AUTO: 95.3 FL (ref 80–99.9)
METER GLUCOSE: 148 MG/DL (ref 74–99)
MONOCYTES ABSOLUTE: 1.09 E9/L (ref 0.1–0.95)
MONOCYTES RELATIVE PERCENT: 14.1 % (ref 2–12)
NEUTROPHILS ABSOLUTE: 4.75 E9/L (ref 1.8–7.3)
NEUTROPHILS RELATIVE PERCENT: 61.3 % (ref 43–80)
PDW BLD-RTO: 15.4 FL (ref 11.5–15)
PLATELET # BLD: 447 E9/L (ref 130–450)
PMV BLD AUTO: 9.1 FL (ref 7–12)
POTASSIUM REFLEX MAGNESIUM: 3.7 MMOL/L (ref 3.5–5)
RBC # BLD: 3.85 E12/L (ref 3.8–5.8)
SODIUM BLD-SCNC: 133 MMOL/L (ref 132–146)
TOTAL PROTEIN: 7.6 G/DL (ref 6.4–8.3)
WBC # BLD: 7.8 E9/L (ref 4.5–11.5)

## 2022-02-07 PROCEDURE — 80053 COMPREHEN METABOLIC PANEL: CPT

## 2022-02-07 PROCEDURE — 94640 AIRWAY INHALATION TREATMENT: CPT

## 2022-02-07 PROCEDURE — 36415 COLL VENOUS BLD VENIPUNCTURE: CPT

## 2022-02-07 PROCEDURE — 85025 COMPLETE CBC W/AUTO DIFF WBC: CPT

## 2022-02-07 PROCEDURE — 82962 GLUCOSE BLOOD TEST: CPT

## 2022-02-07 PROCEDURE — 6370000000 HC RX 637 (ALT 250 FOR IP): Performed by: STUDENT IN AN ORGANIZED HEALTH CARE EDUCATION/TRAINING PROGRAM

## 2022-02-07 PROCEDURE — 6360000002 HC RX W HCPCS: Performed by: INTERNAL MEDICINE

## 2022-02-07 PROCEDURE — 6370000000 HC RX 637 (ALT 250 FOR IP): Performed by: FAMILY MEDICINE

## 2022-02-07 PROCEDURE — 6370000000 HC RX 637 (ALT 250 FOR IP): Performed by: INTERNAL MEDICINE

## 2022-02-07 PROCEDURE — 2580000003 HC RX 258: Performed by: FAMILY MEDICINE

## 2022-02-07 RX ADMIN — TAMSULOSIN HYDROCHLORIDE 0.4 MG: 0.4 CAPSULE ORAL at 07:27

## 2022-02-07 RX ADMIN — APIXABAN 2.5 MG: 2.5 TABLET, FILM COATED ORAL at 07:27

## 2022-02-07 RX ADMIN — ISOSORBIDE MONONITRATE 30 MG: 30 TABLET, EXTENDED RELEASE ORAL at 07:27

## 2022-02-07 RX ADMIN — SODIUM CHLORIDE, PRESERVATIVE FREE 10 ML: 5 INJECTION INTRAVENOUS at 07:27

## 2022-02-07 RX ADMIN — PANTOPRAZOLE SODIUM 40 MG: 40 TABLET, DELAYED RELEASE ORAL at 06:19

## 2022-02-07 RX ADMIN — IPRATROPIUM BROMIDE AND ALBUTEROL SULFATE 3 ML: .5; 2.5 SOLUTION RESPIRATORY (INHALATION) at 09:17

## 2022-02-07 RX ADMIN — EPOETIN ALFA-EPBX 3000 UNITS: 3000 INJECTION, SOLUTION INTRAVENOUS; SUBCUTANEOUS at 07:31

## 2022-02-07 RX ADMIN — DOCUSATE SODIUM 100 MG: 100 CAPSULE, LIQUID FILLED ORAL at 07:27

## 2022-02-07 RX ADMIN — VALSARTAN 40 MG: 80 TABLET, FILM COATED ORAL at 07:27

## 2022-02-07 RX ADMIN — METOPROLOL SUCCINATE 50 MG: 50 TABLET, FILM COATED, EXTENDED RELEASE ORAL at 07:27

## 2022-02-07 ASSESSMENT — PAIN SCALES - GENERAL: PAINLEVEL_OUTOF10: 0

## 2022-02-07 ASSESSMENT — PAIN SCALES - WONG BAKER: WONGBAKER_NUMERICALRESPONSE: 0

## 2022-02-07 NOTE — DISCHARGE SUMMARY
Hospital Medicine Discharge Summary    Patient ID: Kecia Ng      Patient's PCP: No primary care provider on file. Admit Date: 1/25/2022     Discharge Date:   2/7/2022    Admitting Physician: Graeme Traore DO     Discharge Physician: Dominique Lombardi MD     Discharge Condition: Stable    Discharge Diagnoses: Active Hospital Problems    Diagnosis Date Noted    Heart failure ([address] [I50.9] 01/25/2022   Acute heart failure exacerbation  Coronary artery disease-status post CABG-2007  Valvular heart disease  Chronic atrial fibrillation  Mild SAMM with history of CKD stage IIIb  COPD  Anemia of chronic disease  History of GI bleed    The patient was seen and examined on day of discharge and this discharge summary is in conjunction with any daily progress note from day of discharge. Hospital Course:   Patient admitted on 1/25/2022 for <principal problem not specified>  Mr. Kecia Ng, a 80y.o. year old male  who  has a past medical history of CAD (coronary artery disease), CHF (congestive heart failure) ([address] Chronic atrial fibrillation ([address] Chronic kidney disease, stage III (moderate) ([address] COPD (chronic obstructive pulmonary disease) ([address] Diabetes mellitus ([address] Essential hypertension, GERD (gastroesophageal reflux disease), H. pylori infection, Hx of degenerative disc disease, Hyperlipidemia, MI (myocardial infarction) ([address] Osteoarthritis, PFO (patent foramen ovale), Popliteal cyst, and Pulmonary hypertension ([address] Patient presented to the emergency department with complaints of shortness of breath and swelling of the legs. Also notes weight gain. Denies fever, chills, nausea, vomiting, chest pain, abdominal pain. Shortness of breath is worse with exertion. Has been out of his Lasix for the last 2 weeks. Vital signs within normal limits and stable. Currently 98% on 2 L nasal cannula.   Laboratory studies reveal BUN 38, creatinine 2.1, proBNP 6407, troponin of 50, hemoglobin 10.0. Chest x-ray shows stable cardiomegaly. Bibasilar crackles on exam.  Patient given Lasix. Medicine consulted for admission    Cardiology and nephrology were consulted during admission. 2D echo showed an ejection fraction of 45 to 50% nuclear stress test was ordered which showed no ischemic findings-1/27/2022. Medical management per cardiology. Cardiorenal syndrome-was on Bumex drip until 2/6/2022. Patient improved with respect to his pedal edema and abdominal wall edema. He was on a sliding scale for his diabetes mellitus along with his home dose of Lantus. Hemoglobin was stable through the admission. Patient was deemed stable for discharge from cardiology and nephrology standpoint. Start on bumex 1mg from Wednesday due to SAMM. [facility] elevated from baseline          Exam:     /64   Pulse 62   Temp 97.5 °F (36.4 °C) (Oral)   Resp 20   Ht 5' 9\" (1.753 m)   Wt 232 lb 9.6 oz (105.5 kg)   SpO2 95%   BMI 34.35 kg/m²     General appearance: No apparent distress, appears stated age and cooperative. HEENT: Pupils equal, round, and reactive to light. Conjunctivae/corneas clear. Neck: Supple, with full range of motion. No jugular venous distention. Trachea midline. Respiratory:  Normal respiratory effort. Clear to auscultation, bilaterally without Rales/Wheezes/Rhonchi. Cardiovascular: Regular rate and rhythm with normal S1/S2 without murmurs, rubs or gallops. Abdomen: Soft, non-tender, non-distended with normal bowel sounds. Musculoskeletal: No clubbing, cyanosis or edema bilaterally. Full range of motion without deformity. Skin: Skin color, texture, turgor normal.  No rashes or lesions. Neurologic:  Neurovascularly intact without any focal sensory/motor deficits.  Cranial nerves: II-XII intact, grossly non-focal.  Psychiatric: Alert and oriented, thought content appropriate, normal insight      Consults:     IP CONSULT TO INTERNAL MEDICINE  IP CONSULT TO HEART FAILURE NURSE/COORDINATOR  IP CONSULT TO DIETITIAN  IP CONSULT TO CARDIOLOGY  IP CONSULT TO NEPHROLOGY    Significant Diagnostic Studies:       Imaging:  Echo Complete    Result Date: 1/29/2022  Transthoracic Echocardiography Report (TTE)  Demographics   Patient Name    Jody Quiñones  Gender            Male                  M   Medical Record  [id number]     Room Number       6092  Number   Account #       [de-identified]    Procedure Date    01/29/2022   Corporate ID                 Ordering                               Physician   Accession       [id number]   Referring  Number                       Physician   Date of Birth   1940   Sonographer       Grant DHILLON   Age             80 year(s)   Interpreting      [address]                               Physician         Physician Cardiology                                                 Zenaida Ceballos MD                                Any Other  Procedure Type of Study   TTE procedure:Echo Complete W/Doppler & Color Flow. Procedure Date Date: 01/29/2022 Start: 08:42 AM Study Location: Portable Technical Quality: Adequate visualization Indications:Congestive heart failure. Patient Status: Routine Height: 69 inches Weight: 245 pounds BSA: 2.25 m^2 BMI: 36.18 kg/m^2 Rhythm: Within normal limits HR: 85 bpm BP: 120/64 mmHg  Findings   Left Ventricle  Left ventricle size is normal.  Ejection fraction is visually estimated at 45-50%. Overall ejection fraction mildly decreased . Mild concentric left ventricular hypertrophy. Stage III diastolic dysfunction. Right Ventricle  Normal right ventricular size. Right ventricle global systolic function is moderately reduced . TAPSE 12  mm. Left Atrium  The left atrium is severely dilated. No evidence of patent foramen ovale. Right Atrium  Mildly enlarged right atrium size. Mitral Valve  Mild mitral annular calcification.   S/p Jjce-pd-Iwjl repair of the mitral valve with well seated MitraClip. Mitral valve mean gradient 5 mmHg. Mild mitral regurgitation with posteriorly directed jet. Tricuspid Valve  The tricuspid valve appears structurally normal.  Physiologic and/or trace tricuspid regurgitation. RVSP is 41 mmHg. Pulmonary hypertension is mild . Aortic Valve  The aortic valve appears mildly sclerotic. The aortic valve is trileaflet. No hemodynamically significant aortic stenosis is present. BELLO by direct  planimetry is 3.9 cm2. No evidence of aortic valve regurgitation. Pulmonic Valve  Pulmonic valve is structurally normal.  Physiologic and/or trace pulmonic regurgitation present. No evidence of pulmonic valve stenosis. Pericardial Effusion  No evidence for hemodynamically significant pericardial effusion. Pleural Effusion  No evidence of pleural effusion. Aorta  Normal aortic root and ascending aorta. Miscellaneous  Dilated Inferior Vena Cava. Inferior Vena Cava, normal respiratory variation. Conclusions   Summary  Left ventricle size is normal.  Ejection fraction is visually estimated at 45-50%. Overall ejection fraction mildly decreased . Mild concentric left ventricular hypertrophy. Stage III diastolic dysfunction. The left atrium is severely dilated. Normal right ventricular size. Right ventricle global systolic function is moderately reduced . TAPSE 12  mm. Mild mitral annular calcification. S/p Kecp-us-Bmwd repair of the mitral valve with well seated MitraClip. Mitral valve mean gradient 5 mmHg. Mild mitral regurgitation with posteriorly directed jet. No hemodynamically significant aortic stenosis is present. BELLO by direct  planimetry is 3.9 cm2. RVSP is 41 mmHg. Pulmonary hypertension is mild . No evidence for hemodynamically significant pericardial effusion.    Signature   ----------------------------------------------------------------  Electronically signed by Carla Grant MD(Interpreting  physician) on 01/29/2022 08:56 PM ----------------------------------------------------------------  M-Mode/2D Measurements & Calculations   LV Diastolic     LV Systolic Dimension: 3.2 cm   AV Cusp Separation: 1.8  Dimension: 4.4   LV Volume Diastolic: 57.4 ml    cmAO Root Dimension: 3.1  cm               LV Volume Systolic: 88.6 ml     cm  LV FS:27.3 %     LV EDV/LV EDV Index: 88.8 ROXIE/72  LV PW Diastolic: XV/I^1SZ ESV/LV ESV Index: 42.2  1.6 cm           ml/19ml/ m^2  LV PW Systolic:  EF Calculated: 52.5 %  1.9 cm           LV Mass Index: 119 l/min*m^2  Septum           LV Length: 8.8 cm               Ascending Aorta: 2.7 cm  Diastolic: 1.4                                   LA volume/Index: 129 ml  cm               LVOT: 2.1 cm                    /17.99RM/V^9  Septum Systolic:                                 RA Area: 22.2 cm^2  1.5 cm  CO: 4.21 l/min  CI: 1.87 l/m*m^2  LV Mass: 266.87  g  Doppler Measurements & Calculations   MV Peak E-Wave: 1.65 AV Peak Velocity:     LVOT Peak Velocity: 0.83 m/s  m/s                  1.32 m/s              LVOT Mean Velocity: 0.54 m/s  MV Peak A-Wave: 0.36 AV Peak Gradient:     LVOT Peak Gradient: 2.7  m/s                  6.95 mmHg             mmHgLVOT Mean Gradient: 1.4  MV E/A Ratio: 4.55   AV Mean Velocity:     mmHg  MV Peak Gradient:    0.97 m/s  18.3 mmHg            AV Mean Gradient: 4.1  MV Mean Gradient: 5  mmHg  mmHg                 AV VTI: 25.2 cm       TR Velocity:2.87 m/s  MV Mean Velocity:    AV Area               TR Gradient:33.04 mmHg  0.94 m/s             (Continuity):1.96  MV Deceleration      cm^2  Time: 207.7 msec  MV P1/2t: 45.6 msec  LVOT VTI: 14.3 cm     MT ED Velocity: 0.92 m/s  MVA by PHT:4.82 cm^2  MV Area  (continuity): 1.3  cm^2  MV E' Septal  Velocity: 0.06 m/s  MV E' Lateral  Velocity: 8 m/s  [email or web address] [id number]    XR CHEST PORTABLE    Result Date: 1/25/2022  EXAMINATION: ONE XRAY VIEW OF THE CHEST 1/25/2022 7:05 pm COMPARISON: 5/11/7 HISTORY: ORDERING SYSTEM PROVIDED HISTORY: sob/leg edema TECHNOLOGIST PROVIDED HISTORY: Reason for exam:->sob/leg edema What reading provider will be dictating this exam?->CRC FINDINGS: The lungs are without acute focal process. There is no effusion or pneumothorax. Stable cardiomegaly. The osseous structures are without acute process. Sternotomy wires noted. IMPRESSION: No acute process. Stable cardiomegaly. No acute findings. NM Cardiac Stress Test Nuclear Imaging    Result Date: 1/27/2022  Indication:  Shortness of breath Clinical History:   Patient has known historyof coronary artery disease. IMAGING: Myocardial perfusion imaging was performed at rest 30-35 minutes following the intravenous injection of 12 mCi of (Tc-Sestamibi) followed by 10 ml of Normal Saline. At peak exercise, the patient was injected intravenously with 32mCi of (Tc-Sestamibi) followed by 10 ml of Normal Saline. Gated post-stress tomographic imaging was performed 20-25 minutes after stress. FINDINGS: The overall quality of the study was fair. Left ventricular cavity size was noted to be normal. Rotational analog analysis demonstrated significant soft tissue attenuation. There is a large-sized, severe intensity fixed defect of the inferior wall. There is also a moderate-sized, moderate intensity partially reversible defect of the anterior. Gated SPECT left ventricular ejection fraction was calculated to be 62%, with inferior hypokinesis. The myocardial perfusion imaging was abnormal with a large-sized, severe intensity fixed defect of the inferior wall. There is also a moderate-sized, moderate intensity partially reversible defect of the anterior. Overall left ventricular systolic function was normal with wall motion abnormality. Intermediate risk study.          Disposition:  home     Discharge Instructions/Follow-up:    Watch fluid intake closely,  F/u in [place] for monitoring kidney function    Code Status:  Full Code     Activity: activity as tolerated    Diet: cardiac diet and diabetic diet    Labs: For convenience and continuity at follow-up the following most recent labs are provided:      CBC:    Lab Results   Component Value Date    WBC 7.8 02/07/2022    HGB 11.6 02/07/2022    HCT 36.7 02/07/2022     02/07/2022       Renal:    Lab Results   Component Value Date     02/07/2022    K 3.7 02/07/2022    CL 88 02/07/2022    CO2 30 02/07/2022    BUN 76 02/07/2022    CREATININE 3.4 02/07/2022    CALCIUM 9.1 02/07/2022    PHOS 3.7 01/28/2022       Discharge Medications:     Current Discharge Medication List             Details   tamsulosin (FLOMAX) 0.4 MG capsule Take 1 capsule by mouth daily  Qty: 30 capsule, Refills: 3      bumetanide (BUMEX) 1 MG tablet Take 1 tablet by mouth daily  Qty: 30 tablet, Refills: 0                Details   apixaban (ELIQUIS) 2.5 MG TABS tablet Take 1 tablet by mouth 2 times daily  Qty: 60 tablet, Refills: 0      metoprolol succinate (TOPROL XL) 50 MG extended release tablet Take 1 tablet by mouth daily  Qty: 30 tablet, Refills: 3                Details   isosorbide mononitrate (IMDUR) 30 MG extended release tablet Take 30 mg by mouth daily      nitroGLYCERIN (NITROSTAT) 0.4 MG SL tablet up to max of 3 total doses. If no relief after 1 dose, call 911.   Qty: 25 tablet, Refills: 3      acetaminophen (TYLENOL) 325 MG tablet Take 325 mg by mouth 4 times daily as needed for Pain      diphenhydrAMINE (BENADRYL) 25 MG capsule Take 25 mg by mouth as needed for Itching (itchy hands and feet)      albuterol sulfate HFA (PROAIR HFA) 108 (90 BASE) MCG/ACT inhaler Inhale 2 puffs into the lungs 4 times daily as needed for Wheezing or Shortness of Breath      ipratropium-albuterol (DUONEB) 0.5-2.5 (3) MG/3ML SOLN nebulizer solution Take 1 vial by nebulization 3 times daily      gabapentin (NEURONTIN) 100 MG capsule Take 200 mg by mouth 3 times daily      terazosin (HYTRIN) 2 MG capsule Take 2 mg by mouth nightly      valsartan (DIOVAN) 40 MG tablet Take 40 mg by mouth 2 times daily      amLODIPine (NORVASC) 10 MG tablet Take 10 mg by mouth daily       ergocalciferol (ERGOCALCIFEROL) 82680 UNITS capsule Take 50,000 Units by mouth Every 4 weeks      omeprazole (PRILOSEC) 20 MG capsule Take 40 mg by mouth Daily       insulin glargine (LANTUS) 100 UNIT/ML injection vial Inject 60 Units into the skin nightly      Multiple Vitamins-Minerals (THERAPEUTIC MULTIVITAMIN-MINERALS) tablet Take 1 tablet by mouth daily      aspirin 81 MG EC tablet Take 81 mg by mouth daily      glipiZIDE (GLUCOTROL) 5 MG tablet Take 5 mg by mouth 2 times daily (with meals)             Time Spent on discharge is more than 45 minutes in the examination, evaluation, counseling and review of medications and discharge plan. Signed:    Tray Henson MD   2/7/2022      Thank you No primary care provider on file. for the opportunity to be involved in this patient's care. If you have any questions or concerns please feel free to contact me at [phone].

## 2022-02-11 ENCOUNTER — TELEPHONE (OUTPATIENT)
Dept: OTHER | Age: 82
End: 2022-02-11

## 2022-02-11 ENCOUNTER — HOSPITAL ENCOUNTER (OUTPATIENT)
Dept: OTHER | Age: 82
Setting detail: THERAPIES SERIES
Discharge: HOME | End: 2022-02-11

## 2022-02-11 NOTE — TELEPHONE ENCOUNTER
Patient unable to come to CHF consultation today. Per wife, having trouble with his hip and ambulating. She is agreeable to appointment being rescheduled. New appointment made for 2/25/2022.     Future Appointments   Date Time Provider Mila Estrada   2/16/2022 10:30 AM AISHA Patel - CNP [facility]   2/25/2022 12:30 PM [facility] ROOM 1 [facility] [place] HOD

## 2022-02-21 ENCOUNTER — TELEPHONE (OUTPATIENT)
Dept: CARDIOLOGY CLINIC | Age: 82
End: 2022-02-21

## 2023-01-01 ENCOUNTER — APPOINTMENT (OUTPATIENT)
Dept: GENERAL RADIOLOGY | Age: 83
DRG: 291 | End: 2023-01-01
Payer: OTHER GOVERNMENT

## 2023-01-01 ENCOUNTER — HOSPITAL ENCOUNTER (INPATIENT)
Age: 83
LOS: 30 days | DRG: 291 | End: 2023-04-20
Attending: EMERGENCY MEDICINE | Admitting: HOSPITALIST
Payer: OTHER GOVERNMENT

## 2023-01-01 ENCOUNTER — APPOINTMENT (OUTPATIENT)
Dept: CT IMAGING | Age: 83
DRG: 291 | End: 2023-01-01
Payer: OTHER GOVERNMENT

## 2023-01-01 ENCOUNTER — APPOINTMENT (OUTPATIENT)
Dept: ULTRASOUND IMAGING | Age: 83
DRG: 291 | End: 2023-01-01
Payer: OTHER GOVERNMENT

## 2023-01-01 ENCOUNTER — APPOINTMENT (OUTPATIENT)
Dept: NEUROLOGY | Age: 83
DRG: 291 | End: 2023-01-01
Payer: OTHER GOVERNMENT

## 2023-01-01 VITALS
SYSTOLIC BLOOD PRESSURE: 65 MMHG | DIASTOLIC BLOOD PRESSURE: 45 MMHG | BODY MASS INDEX: 29.71 KG/M2 | OXYGEN SATURATION: 49 % | WEIGHT: 200.62 LBS | HEIGHT: 69 IN | TEMPERATURE: 98.4 F

## 2023-01-01 DIAGNOSIS — J96.02 ACUTE RESPIRATORY FAILURE WITH HYPOXIA AND HYPERCAPNIA (HCC): Primary | ICD-10-CM

## 2023-01-01 DIAGNOSIS — J96.01 ACUTE RESPIRATORY FAILURE WITH HYPOXIA AND HYPERCAPNIA (HCC): Primary | ICD-10-CM

## 2023-01-01 DIAGNOSIS — N18.6 END STAGE RENAL DISEASE (HCC): ICD-10-CM

## 2023-01-01 DIAGNOSIS — I50.9 ACUTE ON CHRONIC CONGESTIVE HEART FAILURE, UNSPECIFIED HEART FAILURE TYPE (HCC): ICD-10-CM

## 2023-01-01 LAB
AADO2: 105.6 MMHG
AADO2: 108.2 MMHG
AADO2: 110.2 MMHG
AADO2: 116.1 MMHG
AADO2: 122.8 MMHG
AADO2: 123.3 MMHG
AADO2: 124.4 MMHG
AADO2: 131.4 MMHG
AADO2: 134.4 MMHG
AADO2: 138.9 MMHG
AADO2: 147.8 MMHG
AADO2: 168.1 MMHG
AADO2: 179.3 MMHG
AADO2: 202 MMHG
AADO2: 202.6 MMHG
AADO2: 213.8 MMHG
AADO2: 355.6 MMHG
AADO2: 562.2 MMHG
AADO2: 72.2 MMHG
AADO2: 78.1 MMHG
AADO2: 84.8 MMHG
AADO2: 86.1 MMHG
AADO2: 89.1 MMHG
AADO2: 99.7 MMHG
ABO + RH BLD: NORMAL
ABO + RH BLD: NORMAL
ALBUMIN FLD-MCNC: 1.7 G/DL
ALBUMIN FLD-MCNC: 1.9 G/DL
ALBUMIN SERPL-MCNC: 2 G/DL (ref 3.5–5.2)
ALBUMIN SERPL-MCNC: 2.1 G/DL (ref 3.5–5.2)
ALBUMIN SERPL-MCNC: 2.1 G/DL (ref 3.5–5.2)
ALBUMIN SERPL-MCNC: 2.3 G/DL (ref 3.5–5.2)
ALBUMIN SERPL-MCNC: 2.3 G/DL (ref 3.5–5.2)
ALBUMIN SERPL-MCNC: 2.5 G/DL (ref 3.5–5.2)
ALBUMIN SERPL-MCNC: 2.6 G/DL (ref 3.5–5.2)
ALBUMIN SERPL-MCNC: 2.6 G/DL (ref 3.5–5.2)
ALBUMIN SERPL-MCNC: 2.7 G/DL (ref 3.5–5.2)
ALBUMIN SERPL-MCNC: 2.8 G/DL (ref 3.5–5.2)
ALBUMIN SERPL-MCNC: 2.8 G/DL (ref 3.5–5.2)
ALBUMIN SERPL-MCNC: 2.9 G/DL (ref 3.5–5.2)
ALBUMIN SERPL-MCNC: 3.1 G/DL (ref 3.5–5.2)
ALBUMIN SERPL-MCNC: 3.3 G/DL (ref 3.5–5.2)
ALBUMIN SERPL-MCNC: 3.7 G/DL (ref 3.5–5.2)
ALP SERPL-CCNC: 102 U/L (ref 40–129)
ALP SERPL-CCNC: 116 U/L (ref 40–129)
ALP SERPL-CCNC: 118 U/L (ref 40–129)
ALP SERPL-CCNC: 123 U/L (ref 40–129)
ALP SERPL-CCNC: 59 U/L (ref 40–129)
ALP SERPL-CCNC: 65 U/L (ref 40–129)
ALP SERPL-CCNC: 68 U/L (ref 40–129)
ALP SERPL-CCNC: 69 U/L (ref 40–129)
ALP SERPL-CCNC: 70 U/L (ref 40–129)
ALP SERPL-CCNC: 71 U/L (ref 40–129)
ALP SERPL-CCNC: 72 U/L (ref 40–129)
ALP SERPL-CCNC: 73 U/L (ref 40–129)
ALP SERPL-CCNC: 74 U/L (ref 40–129)
ALP SERPL-CCNC: 75 U/L (ref 40–129)
ALP SERPL-CCNC: 77 U/L (ref 40–129)
ALP SERPL-CCNC: 78 U/L (ref 40–129)
ALP SERPL-CCNC: 78 U/L (ref 40–129)
ALP SERPL-CCNC: 79 U/L (ref 40–129)
ALP SERPL-CCNC: 81 U/L (ref 40–129)
ALP SERPL-CCNC: 81 U/L (ref 40–129)
ALP SERPL-CCNC: 86 U/L (ref 40–129)
ALP SERPL-CCNC: 92 U/L (ref 40–129)
ALP SERPL-CCNC: 93 U/L (ref 40–129)
ALP SERPL-CCNC: 97 U/L (ref 40–129)
ALT SERPL-CCNC: 10 U/L (ref 0–40)
ALT SERPL-CCNC: 11 U/L (ref 0–40)
ALT SERPL-CCNC: 18 U/L (ref 0–40)
ALT SERPL-CCNC: 22 U/L (ref 0–40)
ALT SERPL-CCNC: 30 U/L (ref 0–40)
ALT SERPL-CCNC: 39 U/L (ref 0–40)
ALT SERPL-CCNC: 7 U/L (ref 0–40)
ALT SERPL-CCNC: 8 U/L (ref 0–40)
ALT SERPL-CCNC: 9 U/L (ref 0–40)
AMMONIA PLAS-SCNC: 32 UMOL/L (ref 16–60)
AMMONIA PLAS-SCNC: 43 UMOL/L (ref 16–60)
AMMONIA PLAS-SCNC: 45 UMOL/L (ref 16–60)
AMMONIA PLAS-SCNC: 77 UMOL/L (ref 16–60)
AMMONIA PLAS-SCNC: 80 UMOL/L (ref 16–60)
AMYLASE FLD-CCNC: 38 U/L
AMYLASE FLD-CCNC: 48 U/L
ANGLE (CLOT STRENGTH): 74.2 DEGREE (ref 59–74)
ANGLE (CLOT STRENGTH): 74.8 DEGREE (ref 59–74)
ANGLE (CLOT STRENGTH): 76.2 DEGREE (ref 59–74)
ANGLE (CLOT STRENGTH): 76.9 DEGREE (ref 59–74)
ANION GAP SERPL CALCULATED.3IONS-SCNC: 10 MMOL/L (ref 7–16)
ANION GAP SERPL CALCULATED.3IONS-SCNC: 11 MMOL/L (ref 7–16)
ANION GAP SERPL CALCULATED.3IONS-SCNC: 11 MMOL/L (ref 7–16)
ANION GAP SERPL CALCULATED.3IONS-SCNC: 12 MMOL/L (ref 7–16)
ANION GAP SERPL CALCULATED.3IONS-SCNC: 13 MMOL/L (ref 7–16)
ANION GAP SERPL CALCULATED.3IONS-SCNC: 14 MMOL/L (ref 7–16)
ANION GAP SERPL CALCULATED.3IONS-SCNC: 16 MMOL/L (ref 7–16)
ANION GAP SERPL CALCULATED.3IONS-SCNC: 17 MMOL/L (ref 7–16)
ANION GAP SERPL CALCULATED.3IONS-SCNC: 18 MMOL/L (ref 7–16)
ANION GAP SERPL CALCULATED.3IONS-SCNC: 19 MMOL/L (ref 7–16)
ANION GAP SERPL CALCULATED.3IONS-SCNC: 20 MMOL/L (ref 7–16)
ANION GAP SERPL CALCULATED.3IONS-SCNC: 20 MMOL/L (ref 7–16)
ANION GAP SERPL CALCULATED.3IONS-SCNC: 6 MMOL/L (ref 7–16)
ANION GAP SERPL CALCULATED.3IONS-SCNC: 7 MMOL/L (ref 7–16)
ANION GAP SERPL CALCULATED.3IONS-SCNC: 8 MMOL/L (ref 7–16)
ANION GAP SERPL CALCULATED.3IONS-SCNC: 9 MMOL/L (ref 7–16)
ANISOCYTOSIS: ABNORMAL
APPEARANCE FLUID: NORMAL
APPEARANCE FLUID: NORMAL
APTT BLD: 36.6 SEC (ref 24.5–35.1)
AST SERPL-CCNC: 12 U/L (ref 0–39)
AST SERPL-CCNC: 17 U/L (ref 0–39)
AST SERPL-CCNC: 18 U/L (ref 0–39)
AST SERPL-CCNC: 19 U/L (ref 0–39)
AST SERPL-CCNC: 19 U/L (ref 0–39)
AST SERPL-CCNC: 20 U/L (ref 0–39)
AST SERPL-CCNC: 21 U/L (ref 0–39)
AST SERPL-CCNC: 22 U/L (ref 0–39)
AST SERPL-CCNC: 46 U/L (ref 0–39)
AST SERPL-CCNC: 57 U/L (ref 0–39)
AST SERPL-CCNC: 69 U/L (ref 0–39)
AST SERPL-CCNC: 87 U/L (ref 0–39)
B PARAP IS1001 DNA NPH QL NAA+NON-PROBE: NOT DETECTED
B PERT.PT PRMT NPH QL NAA+NON-PROBE: NOT DETECTED
B.E.: -0.9 MMOL/L (ref -3–3)
B.E.: -1.2 MMOL/L (ref -3–3)
B.E.: -1.6 MMOL/L (ref -3–3)
B.E.: -1.7 MMOL/L (ref -3–3)
B.E.: -2.6 MMOL/L (ref -3–3)
B.E.: -2.7 MMOL/L (ref -3–3)
B.E.: 0.5 MMOL/L (ref -3–3)
B.E.: 10.1 MMOL/L (ref -3–3)
B.E.: 10.4 MMOL/L (ref -3–3)
B.E.: 10.5 MMOL/L (ref -3–3)
B.E.: 10.6 MMOL/L (ref -3–3)
B.E.: 11.2 MMOL/L (ref -3–3)
B.E.: 13 MMOL/L (ref -3–3)
B.E.: 13.9 MMOL/L (ref -3–3)
B.E.: 3.3 MMOL/L (ref -3–3)
B.E.: 5 MMOL/L (ref -3–3)
B.E.: 5.1 MMOL/L (ref -3–3)
B.E.: 5.7 MMOL/L (ref -3–3)
B.E.: 5.8 MMOL/L (ref -3–3)
B.E.: 6.9 MMOL/L (ref -3–3)
B.E.: 7.4 MMOL/L (ref -3–3)
B.E.: 7.6 MMOL/L (ref -3–3)
B.E.: 7.6 MMOL/L (ref -3–3)
B.E.: 7.8 MMOL/L (ref -3–3)
B.E.: 8.2 MMOL/L (ref -3–3)
B.E.: 8.3 MMOL/L (ref -3–3)
B.E.: 8.3 MMOL/L (ref -3–3)
B.E.: 8.8 MMOL/L (ref -3–3)
B.E.: 9 MMOL/L (ref -3–3)
B.E.: 9 MMOL/L (ref -3–3)
B.E.: 9.5 MMOL/L (ref -3–3)
BACTERIA BLD CULT ORG #2: NORMAL
BACTERIA BLD CULT ORG #2: NORMAL
BACTERIA BLD CULT: NORMAL
BACTERIA BLD CULT: NORMAL
BACTERIA FLD AEROBE CULT: NORMAL
BACTERIA FLD AEROBE CULT: NORMAL
BACTERIA SPEC RESP CULT: ABNORMAL
BACTERIA UR CULT: NORMAL
BASOPHILIC STIPPLING: ABNORMAL
BASOPHILS # BLD: 0 E9/L (ref 0–0.2)
BASOPHILS # BLD: 0.01 E9/L (ref 0–0.2)
BASOPHILS # BLD: 0.02 E9/L (ref 0–0.2)
BASOPHILS # BLD: 0.03 E9/L (ref 0–0.2)
BASOPHILS # BLD: 0.03 E9/L (ref 0–0.2)
BASOPHILS # BLD: 0.04 E9/L (ref 0–0.2)
BASOPHILS # BLD: 0.04 E9/L (ref 0–0.2)
BASOPHILS # BLD: 0.07 E9/L (ref 0–0.2)
BASOPHILS # BLD: 0.09 E9/L (ref 0–0.2)
BASOPHILS # BLD: 0.1 E9/L (ref 0–0.2)
BASOPHILS NFR BLD: 0.1 % (ref 0–2)
BASOPHILS NFR BLD: 0.2 % (ref 0–2)
BASOPHILS NFR BLD: 0.3 % (ref 0–2)
BASOPHILS NFR BLD: 0.4 % (ref 0–2)
BASOPHILS NFR BLD: 0.9 % (ref 0–2)
BILIRUB DIRECT SERPL-MCNC: 0.4 MG/DL (ref 0–0.3)
BILIRUB DIRECT SERPL-MCNC: 0.4 MG/DL (ref 0–0.3)
BILIRUB DIRECT SERPL-MCNC: 6.6 MG/DL (ref 0–0.3)
BILIRUB DIRECT SERPL-MCNC: 7.3 MG/DL (ref 0–0.3)
BILIRUB DIRECT SERPL-MCNC: 7.7 MG/DL (ref 0–0.3)
BILIRUB DIRECT SERPL-MCNC: 8 MG/DL (ref 0–0.3)
BILIRUB INDIRECT SERPL-MCNC: 0.5 MG/DL (ref 0–1)
BILIRUB INDIRECT SERPL-MCNC: 0.6 MG/DL (ref 0–1)
BILIRUB INDIRECT SERPL-MCNC: 1.5 MG/DL (ref 0–1)
BILIRUB INDIRECT SERPL-MCNC: 1.5 MG/DL (ref 0–1)
BILIRUB INDIRECT SERPL-MCNC: 1.6 MG/DL (ref 0–1)
BILIRUB INDIRECT SERPL-MCNC: 1.6 MG/DL (ref 0–1)
BILIRUB SERPL-MCNC: 0.9 MG/DL (ref 0–1.2)
BILIRUB SERPL-MCNC: 1 MG/DL (ref 0–1.2)
BILIRUB SERPL-MCNC: 1.4 MG/DL (ref 0–1.2)
BILIRUB SERPL-MCNC: 1.6 MG/DL (ref 0–1.2)
BILIRUB SERPL-MCNC: 1.7 MG/DL (ref 0–1.2)
BILIRUB SERPL-MCNC: 1.8 MG/DL (ref 0–1.2)
BILIRUB SERPL-MCNC: 1.9 MG/DL (ref 0–1.2)
BILIRUB SERPL-MCNC: 2 MG/DL (ref 0–1.2)
BILIRUB SERPL-MCNC: 2 MG/DL (ref 0–1.2)
BILIRUB SERPL-MCNC: 2.1 MG/DL (ref 0–1.2)
BILIRUB SERPL-MCNC: 2.2 MG/DL (ref 0–1.2)
BILIRUB SERPL-MCNC: 2.4 MG/DL (ref 0–1.2)
BILIRUB SERPL-MCNC: 2.4 MG/DL (ref 0–1.2)
BILIRUB SERPL-MCNC: 2.5 MG/DL (ref 0–1.2)
BILIRUB SERPL-MCNC: 2.6 MG/DL (ref 0–1.2)
BILIRUB SERPL-MCNC: 2.7 MG/DL (ref 0–1.2)
BILIRUB SERPL-MCNC: 8.2 MG/DL (ref 0–1.2)
BILIRUB SERPL-MCNC: 8.9 MG/DL (ref 0–1.2)
BILIRUB SERPL-MCNC: 9.2 MG/DL (ref 0–1.2)
BILIRUB SERPL-MCNC: 9.5 MG/DL (ref 0–1.2)
BLD GP AB SCN SERPL QL: NORMAL
BLD GP AB SCN SERPL QL: NORMAL
BLOOD BANK DISPENSE STATUS: NORMAL
BLOOD BANK PRODUCT CODE: NORMAL
BNP BLD-MCNC: ABNORMAL PG/ML (ref 0–450)
BPU ID: NORMAL
BUN SERPL-MCNC: 106 MG/DL (ref 6–23)
BUN SERPL-MCNC: 108 MG/DL (ref 6–23)
BUN SERPL-MCNC: 110 MG/DL (ref 6–23)
BUN SERPL-MCNC: 111 MG/DL (ref 6–23)
BUN SERPL-MCNC: 111 MG/DL (ref 6–23)
BUN SERPL-MCNC: 112 MG/DL (ref 6–23)
BUN SERPL-MCNC: 113 MG/DL (ref 6–23)
BUN SERPL-MCNC: 14 MG/DL (ref 6–23)
BUN SERPL-MCNC: 15 MG/DL (ref 6–23)
BUN SERPL-MCNC: 16 MG/DL (ref 6–23)
BUN SERPL-MCNC: 18 MG/DL (ref 6–23)
BUN SERPL-MCNC: 19 MG/DL (ref 6–23)
BUN SERPL-MCNC: 19 MG/DL (ref 6–23)
BUN SERPL-MCNC: 21 MG/DL (ref 6–23)
BUN SERPL-MCNC: 23 MG/DL (ref 6–23)
BUN SERPL-MCNC: 24 MG/DL (ref 6–23)
BUN SERPL-MCNC: 25 MG/DL (ref 6–23)
BUN SERPL-MCNC: 26 MG/DL (ref 6–23)
BUN SERPL-MCNC: 29 MG/DL (ref 6–23)
BUN SERPL-MCNC: 29 MG/DL (ref 6–23)
BUN SERPL-MCNC: 30 MG/DL (ref 6–23)
BUN SERPL-MCNC: 31 MG/DL (ref 6–23)
BUN SERPL-MCNC: 33 MG/DL (ref 6–23)
BUN SERPL-MCNC: 34 MG/DL (ref 6–23)
BUN SERPL-MCNC: 36 MG/DL (ref 6–23)
BUN SERPL-MCNC: 37 MG/DL (ref 6–23)
BUN SERPL-MCNC: 37 MG/DL (ref 6–23)
BUN SERPL-MCNC: 38 MG/DL (ref 6–23)
BUN SERPL-MCNC: 38 MG/DL (ref 6–23)
BUN SERPL-MCNC: 39 MG/DL (ref 6–23)
BUN SERPL-MCNC: 41 MG/DL (ref 6–23)
BUN SERPL-MCNC: 42 MG/DL (ref 6–23)
BUN SERPL-MCNC: 44 MG/DL (ref 6–23)
BUN SERPL-MCNC: 46 MG/DL (ref 6–23)
BUN SERPL-MCNC: 49 MG/DL (ref 6–23)
BUN SERPL-MCNC: 50 MG/DL (ref 6–23)
BUN SERPL-MCNC: 58 MG/DL (ref 6–23)
BUN SERPL-MCNC: 59 MG/DL (ref 6–23)
BUN SERPL-MCNC: 66 MG/DL (ref 6–23)
BUN SERPL-MCNC: 72 MG/DL (ref 6–23)
BUN SERPL-MCNC: 73 MG/DL (ref 6–23)
BUN SERPL-MCNC: 78 MG/DL (ref 6–23)
BUN SERPL-MCNC: 82 MG/DL (ref 6–23)
BUN SERPL-MCNC: 87 MG/DL (ref 6–23)
BUN SERPL-MCNC: 96 MG/DL (ref 6–23)
BUN SERPL-MCNC: 99 MG/DL (ref 6–23)
BURR CELLS: ABNORMAL
C PNEUM DNA NPH QL NAA+NON-PROBE: NOT DETECTED
CA-I BLD-SCNC: 0.47 MMOL/L (ref 1.15–1.33)
CA-I BLD-SCNC: 0.54 MMOL/L (ref 1.15–1.33)
CA-I BLD-SCNC: 0.57 MMOL/L (ref 1.15–1.33)
CA-I BLD-SCNC: 0.67 MMOL/L (ref 1.15–1.33)
CA-I BLD-SCNC: 0.77 MMOL/L (ref 1.15–1.33)
CA-I BLD-SCNC: 1.02 MMOL/L (ref 1.15–1.33)
CA-I BLD-SCNC: 1.05 MMOL/L (ref 1.15–1.33)
CA-I BLD-SCNC: 1.09 MMOL/L (ref 1.15–1.33)
CA-I BLD-SCNC: 1.09 MMOL/L (ref 1.15–1.33)
CA-I BLD-SCNC: 1.15 MMOL/L (ref 1.15–1.33)
CA-I BLD-SCNC: 1.15 MMOL/L (ref 1.15–1.33)
CA-I BLD-SCNC: 1.16 MMOL/L (ref 1.15–1.33)
CA-I BLD-SCNC: 1.19 MMOL/L (ref 1.15–1.33)
CA-I BLD-SCNC: 1.2 MMOL/L (ref 1.15–1.33)
CA-I BLD-SCNC: 1.2 MMOL/L (ref 1.15–1.33)
CA-I BLD-SCNC: 1.22 MMOL/L (ref 1.15–1.33)
CA-I BLD-SCNC: 1.24 MMOL/L (ref 1.15–1.33)
CA-I BLD-SCNC: 1.24 MMOL/L (ref 1.15–1.33)
CA-I BLD-SCNC: 1.25 MMOL/L (ref 1.15–1.33)
CA-I BLD-SCNC: 1.27 MMOL/L (ref 1.15–1.33)
CA-I BLD-SCNC: 1.29 MMOL/L (ref 1.15–1.33)
CALCIUM SERPL-MCNC: 10 MG/DL (ref 8.6–10.2)
CALCIUM SERPL-MCNC: 10.1 MG/DL (ref 8.6–10.2)
CALCIUM SERPL-MCNC: 10.2 MG/DL (ref 8.6–10.2)
CALCIUM SERPL-MCNC: 7.4 MG/DL (ref 8.6–10.2)
CALCIUM SERPL-MCNC: 7.4 MG/DL (ref 8.6–10.2)
CALCIUM SERPL-MCNC: 7.5 MG/DL (ref 8.6–10.2)
CALCIUM SERPL-MCNC: 7.6 MG/DL (ref 8.6–10.2)
CALCIUM SERPL-MCNC: 7.6 MG/DL (ref 8.6–10.2)
CALCIUM SERPL-MCNC: 7.7 MG/DL (ref 8.6–10.2)
CALCIUM SERPL-MCNC: 7.7 MG/DL (ref 8.6–10.2)
CALCIUM SERPL-MCNC: 7.8 MG/DL (ref 8.6–10.2)
CALCIUM SERPL-MCNC: 7.9 MG/DL (ref 8.6–10.2)
CALCIUM SERPL-MCNC: 7.9 MG/DL (ref 8.6–10.2)
CALCIUM SERPL-MCNC: 8 MG/DL (ref 8.6–10.2)
CALCIUM SERPL-MCNC: 8 MG/DL (ref 8.6–10.2)
CALCIUM SERPL-MCNC: 8.1 MG/DL (ref 8.6–10.2)
CALCIUM SERPL-MCNC: 8.1 MG/DL (ref 8.6–10.2)
CALCIUM SERPL-MCNC: 8.2 MG/DL (ref 8.6–10.2)
CALCIUM SERPL-MCNC: 8.2 MG/DL (ref 8.6–10.2)
CALCIUM SERPL-MCNC: 8.3 MG/DL (ref 8.6–10.2)
CALCIUM SERPL-MCNC: 8.3 MG/DL (ref 8.6–10.2)
CALCIUM SERPL-MCNC: 8.4 MG/DL (ref 8.6–10.2)
CALCIUM SERPL-MCNC: 8.5 MG/DL (ref 8.6–10.2)
CALCIUM SERPL-MCNC: 8.5 MG/DL (ref 8.6–10.2)
CALCIUM SERPL-MCNC: 8.6 MG/DL (ref 8.6–10.2)
CALCIUM SERPL-MCNC: 8.9 MG/DL (ref 8.6–10.2)
CALCIUM SERPL-MCNC: 9.1 MG/DL (ref 8.6–10.2)
CALCIUM SERPL-MCNC: 9.1 MG/DL (ref 8.6–10.2)
CALCIUM SERPL-MCNC: 9.3 MG/DL (ref 8.6–10.2)
CALCIUM SERPL-MCNC: 9.4 MG/DL (ref 8.6–10.2)
CALCIUM SERPL-MCNC: 9.5 MG/DL (ref 8.6–10.2)
CALCIUM SERPL-MCNC: 9.6 MG/DL (ref 8.6–10.2)
CALCIUM SERPL-MCNC: 9.6 MG/DL (ref 8.6–10.2)
CALCIUM SERPL-MCNC: 9.7 MG/DL (ref 8.6–10.2)
CALCIUM SERPL-MCNC: 9.9 MG/DL (ref 8.6–10.2)
CELL COUNT FLUID TYPE: NORMAL
CELL COUNT FLUID TYPE: NORMAL
CHLORIDE SERPL-SCNC: 100 MMOL/L (ref 98–107)
CHLORIDE SERPL-SCNC: 100 MMOL/L (ref 98–107)
CHLORIDE SERPL-SCNC: 101 MMOL/L (ref 98–107)
CHLORIDE SERPL-SCNC: 102 MMOL/L (ref 98–107)
CHLORIDE SERPL-SCNC: 103 MMOL/L (ref 98–107)
CHLORIDE SERPL-SCNC: 104 MMOL/L (ref 98–107)
CHLORIDE SERPL-SCNC: 105 MMOL/L (ref 98–107)
CHLORIDE SERPL-SCNC: 106 MMOL/L (ref 98–107)
CHLORIDE SERPL-SCNC: 90 MMOL/L (ref 98–107)
CHLORIDE SERPL-SCNC: 91 MMOL/L (ref 98–107)
CHLORIDE SERPL-SCNC: 94 MMOL/L (ref 98–107)
CHLORIDE SERPL-SCNC: 95 MMOL/L (ref 98–107)
CHLORIDE SERPL-SCNC: 96 MMOL/L (ref 98–107)
CHLORIDE SERPL-SCNC: 96 MMOL/L (ref 98–107)
CHLORIDE SERPL-SCNC: 97 MMOL/L (ref 98–107)
CHLORIDE SERPL-SCNC: 98 MMOL/L (ref 98–107)
CHLORIDE SERPL-SCNC: 99 MMOL/L (ref 98–107)
CHLORIDE UR-SCNC: <20 MMOL/L
CO2 SERPL-SCNC: 19 MMOL/L (ref 22–29)
CO2 SERPL-SCNC: 19 MMOL/L (ref 22–29)
CO2 SERPL-SCNC: 20 MMOL/L (ref 22–29)
CO2 SERPL-SCNC: 20 MMOL/L (ref 22–29)
CO2 SERPL-SCNC: 21 MMOL/L (ref 22–29)
CO2 SERPL-SCNC: 22 MMOL/L (ref 22–29)
CO2 SERPL-SCNC: 25 MMOL/L (ref 22–29)
CO2 SERPL-SCNC: 26 MMOL/L (ref 22–29)
CO2 SERPL-SCNC: 27 MMOL/L (ref 22–29)
CO2 SERPL-SCNC: 29 MMOL/L (ref 22–29)
CO2 SERPL-SCNC: 30 MMOL/L (ref 22–29)
CO2 SERPL-SCNC: 31 MMOL/L (ref 22–29)
CO2 SERPL-SCNC: 32 MMOL/L (ref 22–29)
CO2 SERPL-SCNC: 33 MMOL/L (ref 22–29)
CO2 SERPL-SCNC: 34 MMOL/L (ref 22–29)
CO2 SERPL-SCNC: 35 MMOL/L (ref 22–29)
CO2 SERPL-SCNC: 36 MMOL/L (ref 22–29)
CO2 SERPL-SCNC: 37 MMOL/L (ref 22–29)
CO2 SERPL-SCNC: 38 MMOL/L (ref 22–29)
COHB: 0.5 % (ref 0–1.5)
COHB: 0.6 % (ref 0–1.5)
COHB: 0.6 % (ref 0–1.5)
COHB: 0.9 % (ref 0–1.5)
COHB: 1 % (ref 0–1.5)
COHB: 1.1 % (ref 0–1.5)
COHB: 1.2 % (ref 0–1.5)
COHB: 1.3 % (ref 0–1.5)
COHB: 1.4 % (ref 0–1.5)
COHB: 1.5 % (ref 0–1.5)
COHB: 1.5 % (ref 0–1.5)
COHB: 1.6 % (ref 0–1.5)
COHB: 1.6 % (ref 0–1.5)
COHB: 1.9 % (ref 0–1.5)
COHB: 1.9 % (ref 0–1.5)
COHB: 2 % (ref 0–1.5)
COHB: 2.3 % (ref 0–1.5)
COHB: 2.3 % (ref 0–1.5)
COHB: 2.5 % (ref 0–1.5)
COLOR FLUID: YELLOW
COLOR FLUID: YELLOW
COMMENT: ABNORMAL
CREAT SERPL-MCNC: 1.2 MG/DL (ref 0.7–1.2)
CREAT SERPL-MCNC: 1.2 MG/DL (ref 0.7–1.2)
CREAT SERPL-MCNC: 1.3 MG/DL (ref 0.7–1.2)
CREAT SERPL-MCNC: 1.4 MG/DL (ref 0.7–1.2)
CREAT SERPL-MCNC: 1.6 MG/DL (ref 0.7–1.2)
CREAT SERPL-MCNC: 1.8 MG/DL (ref 0.7–1.2)
CREAT SERPL-MCNC: 2 MG/DL (ref 0.7–1.2)
CREAT SERPL-MCNC: 2.1 MG/DL (ref 0.7–1.2)
CREAT SERPL-MCNC: 2.2 MG/DL (ref 0.7–1.2)
CREAT SERPL-MCNC: 2.3 MG/DL (ref 0.7–1.2)
CREAT SERPL-MCNC: 2.3 MG/DL (ref 0.7–1.2)
CREAT SERPL-MCNC: 2.4 MG/DL (ref 0.7–1.2)
CREAT SERPL-MCNC: 2.5 MG/DL (ref 0.7–1.2)
CREAT SERPL-MCNC: 2.6 MG/DL (ref 0.7–1.2)
CREAT SERPL-MCNC: 2.9 MG/DL (ref 0.7–1.2)
CREAT SERPL-MCNC: 2.9 MG/DL (ref 0.7–1.2)
CREAT SERPL-MCNC: 3 MG/DL (ref 0.7–1.2)
CREAT SERPL-MCNC: 3.1 MG/DL (ref 0.7–1.2)
CREAT SERPL-MCNC: 3.1 MG/DL (ref 0.7–1.2)
CREAT SERPL-MCNC: 3.2 MG/DL (ref 0.7–1.2)
CREAT SERPL-MCNC: 3.3 MG/DL (ref 0.7–1.2)
CREAT SERPL-MCNC: 3.4 MG/DL (ref 0.7–1.2)
CREAT SERPL-MCNC: 3.6 MG/DL (ref 0.7–1.2)
CREAT SERPL-MCNC: 3.8 MG/DL (ref 0.7–1.2)
CREAT SERPL-MCNC: 3.8 MG/DL (ref 0.7–1.2)
CREAT SERPL-MCNC: 3.9 MG/DL (ref 0.7–1.2)
CREAT SERPL-MCNC: 3.9 MG/DL (ref 0.7–1.2)
CREAT SERPL-MCNC: 4.1 MG/DL (ref 0.7–1.2)
CREAT SERPL-MCNC: 4.2 MG/DL (ref 0.7–1.2)
CREAT SERPL-MCNC: 4.3 MG/DL (ref 0.7–1.2)
CREAT SERPL-MCNC: 4.4 MG/DL (ref 0.7–1.2)
CREAT UR-MCNC: 116 MG/DL (ref 40–278)
CRITICAL: ABNORMAL
DATE ANALYZED: ABNORMAL
DATE OF COLLECTION: ABNORMAL
DELIVERY SYSTEMS: ABNORMAL
DESCRIPTION BLOOD BANK: NORMAL
DEVICE: ABNORMAL
DOHLE BODIES: ABNORMAL
DOHLE BODIES: ABNORMAL
EKG ATRIAL RATE: 468 BPM
EKG ATRIAL RATE: 86 BPM
EKG Q-T INTERVAL: 294 MS
EKG Q-T INTERVAL: 386 MS
EKG QRS DURATION: 96 MS
EKG QRS DURATION: 98 MS
EKG QTC CALCULATION (BAZETT): 475 MS
EKG QTC CALCULATION (BAZETT): 482 MS
EKG R AXIS: 15 DEGREES
EKG R AXIS: 21 DEGREES
EKG T AXIS: -106 DEGREES
EKG T AXIS: -139 DEGREES
EKG VENTRICULAR RATE: 157 BPM
EKG VENTRICULAR RATE: 94 BPM
EOSINOPHIL # BLD: 0 E9/L (ref 0.05–0.5)
EOSINOPHIL # BLD: 0.02 E9/L (ref 0.05–0.5)
EOSINOPHIL # BLD: 0.02 E9/L (ref 0.05–0.5)
EOSINOPHIL # BLD: 0.04 E9/L (ref 0.05–0.5)
EOSINOPHIL # BLD: 0.05 E9/L (ref 0.05–0.5)
EOSINOPHIL # BLD: 0.06 E9/L (ref 0.05–0.5)
EOSINOPHIL # BLD: 0.06 E9/L (ref 0.05–0.5)
EOSINOPHIL # BLD: 0.1 E9/L (ref 0.05–0.5)
EOSINOPHIL # BLD: 0.11 E9/L (ref 0.05–0.5)
EOSINOPHIL # BLD: 0.12 E9/L (ref 0.05–0.5)
EOSINOPHIL # BLD: 0.14 E9/L (ref 0.05–0.5)
EOSINOPHIL # BLD: 0.14 E9/L (ref 0.05–0.5)
EOSINOPHIL # BLD: 0.16 E9/L (ref 0.05–0.5)
EOSINOPHIL # BLD: 0.19 E9/L (ref 0.05–0.5)
EOSINOPHIL # BLD: 0.21 E9/L (ref 0.05–0.5)
EOSINOPHIL # BLD: 0.23 E9/L (ref 0.05–0.5)
EOSINOPHIL # BLD: 0.24 E9/L (ref 0.05–0.5)
EOSINOPHIL # BLD: 0.26 E9/L (ref 0.05–0.5)
EOSINOPHIL # BLD: 0.27 E9/L (ref 0.05–0.5)
EOSINOPHIL # BLD: 0.28 E9/L (ref 0.05–0.5)
EOSINOPHIL # BLD: 0.28 E9/L (ref 0.05–0.5)
EOSINOPHIL # BLD: 0.44 E9/L (ref 0.05–0.5)
EOSINOPHIL # BLD: 0.81 E9/L (ref 0.05–0.5)
EOSINOPHIL # BLD: 0.89 E9/L (ref 0.05–0.5)
EOSINOPHIL NFR BLD: 0 % (ref 0–6)
EOSINOPHIL NFR BLD: 0.2 % (ref 0–6)
EOSINOPHIL NFR BLD: 0.3 % (ref 0–6)
EOSINOPHIL NFR BLD: 0.5 % (ref 0–6)
EOSINOPHIL NFR BLD: 0.7 % (ref 0–6)
EOSINOPHIL NFR BLD: 0.7 % (ref 0–6)
EOSINOPHIL NFR BLD: 0.9 % (ref 0–6)
EOSINOPHIL NFR BLD: 1 % (ref 0–6)
EOSINOPHIL NFR BLD: 1 % (ref 0–6)
EOSINOPHIL NFR BLD: 1.1 % (ref 0–6)
EOSINOPHIL NFR BLD: 1.4 % (ref 0–6)
EOSINOPHIL NFR BLD: 1.5 % (ref 0–6)
EOSINOPHIL NFR BLD: 1.7 % (ref 0–6)
EOSINOPHIL NFR BLD: 1.8 % (ref 0–6)
EOSINOPHIL NFR BLD: 2.1 % (ref 0–6)
EOSINOPHIL NFR BLD: 2.1 % (ref 0–6)
EOSINOPHIL NFR BLD: 2.6 % (ref 0–6)
EOSINOPHIL NFR BLD: 2.8 % (ref 0–6)
EOSINOPHIL NFR BLD: 3.1 % (ref 0–6)
EOSINOPHIL NFR BLD: 4.4 % (ref 0–6)
EOSINOPHIL NFR BLD: 5.2 % (ref 0–6)
EOSINOPHIL NFR BLD: 6.1 % (ref 0–6)
EPL-TEG: 0 % (ref 0–15)
ERYTHROCYTE [DISTWIDTH] IN BLOOD BY AUTOMATED COUNT: 15.4 FL (ref 11.5–15)
ERYTHROCYTE [DISTWIDTH] IN BLOOD BY AUTOMATED COUNT: 15.5 FL (ref 11.5–15)
ERYTHROCYTE [DISTWIDTH] IN BLOOD BY AUTOMATED COUNT: 15.7 FL (ref 11.5–15)
ERYTHROCYTE [DISTWIDTH] IN BLOOD BY AUTOMATED COUNT: 15.8 FL (ref 11.5–15)
ERYTHROCYTE [DISTWIDTH] IN BLOOD BY AUTOMATED COUNT: 15.9 FL (ref 11.5–15)
ERYTHROCYTE [DISTWIDTH] IN BLOOD BY AUTOMATED COUNT: 16 FL (ref 11.5–15)
ERYTHROCYTE [DISTWIDTH] IN BLOOD BY AUTOMATED COUNT: 16.2 FL (ref 11.5–15)
ERYTHROCYTE [DISTWIDTH] IN BLOOD BY AUTOMATED COUNT: 16.2 FL (ref 11.5–15)
ERYTHROCYTE [DISTWIDTH] IN BLOOD BY AUTOMATED COUNT: 16.3 FL (ref 11.5–15)
ERYTHROCYTE [DISTWIDTH] IN BLOOD BY AUTOMATED COUNT: 16.4 FL (ref 11.5–15)
ERYTHROCYTE [DISTWIDTH] IN BLOOD BY AUTOMATED COUNT: 16.4 FL (ref 11.5–15)
ERYTHROCYTE [DISTWIDTH] IN BLOOD BY AUTOMATED COUNT: 16.7 FL (ref 11.5–15)
ERYTHROCYTE [DISTWIDTH] IN BLOOD BY AUTOMATED COUNT: 16.7 FL (ref 11.5–15)
ERYTHROCYTE [DISTWIDTH] IN BLOOD BY AUTOMATED COUNT: 16.8 FL (ref 11.5–15)
ERYTHROCYTE [DISTWIDTH] IN BLOOD BY AUTOMATED COUNT: 16.9 FL (ref 11.5–15)
ERYTHROCYTE [DISTWIDTH] IN BLOOD BY AUTOMATED COUNT: 17.1 FL (ref 11.5–15)
ERYTHROCYTE [DISTWIDTH] IN BLOOD BY AUTOMATED COUNT: 17.3 FL (ref 11.5–15)
ERYTHROCYTE [DISTWIDTH] IN BLOOD BY AUTOMATED COUNT: 17.3 FL (ref 11.5–15)
ERYTHROCYTE [DISTWIDTH] IN BLOOD BY AUTOMATED COUNT: 17.6 FL (ref 11.5–15)
ERYTHROCYTE [DISTWIDTH] IN BLOOD BY AUTOMATED COUNT: 17.8 FL (ref 11.5–15)
ERYTHROCYTE [DISTWIDTH] IN BLOOD BY AUTOMATED COUNT: 17.9 FL (ref 11.5–15)
ERYTHROCYTE [DISTWIDTH] IN BLOOD BY AUTOMATED COUNT: 18 FL (ref 11.5–15)
ERYTHROCYTE [DISTWIDTH] IN BLOOD BY AUTOMATED COUNT: 18.1 FL (ref 11.5–15)
ERYTHROCYTE [DISTWIDTH] IN BLOOD BY AUTOMATED COUNT: 18.1 FL (ref 11.5–15)
ERYTHROCYTE [DISTWIDTH] IN BLOOD BY AUTOMATED COUNT: 18.2 FL (ref 11.5–15)
ERYTHROCYTE [DISTWIDTH] IN BLOOD BY AUTOMATED COUNT: 18.2 FL (ref 11.5–15)
ERYTHROCYTE [DISTWIDTH] IN BLOOD BY AUTOMATED COUNT: 18.3 FL (ref 11.5–15)
ERYTHROCYTE [DISTWIDTH] IN BLOOD BY AUTOMATED COUNT: 18.3 FL (ref 11.5–15)
ERYTHROCYTE [DISTWIDTH] IN BLOOD BY AUTOMATED COUNT: 18.6 FL (ref 11.5–15)
ERYTHROCYTE [DISTWIDTH] IN BLOOD BY AUTOMATED COUNT: 18.6 FL (ref 11.5–15)
ERYTHROCYTE [DISTWIDTH] IN BLOOD BY AUTOMATED COUNT: 19.5 FL (ref 11.5–15)
ERYTHROCYTE [DISTWIDTH] IN BLOOD BY AUTOMATED COUNT: 20 FL (ref 11.5–15)
ERYTHROCYTE [DISTWIDTH] IN BLOOD BY AUTOMATED COUNT: 21.1 FL (ref 11.5–15)
FERRITIN SERPL-MCNC: 71 NG/ML
FIO2: 100 %
FIO2: 40 %
FIO2: 45 %
FIO2: 50 %
FIO2: 55 %
FIO2: 60
FIO2: 65 %
FLUAV RNA NPH QL NAA+NON-PROBE: NOT DETECTED
FLUBV RNA NPH QL NAA+NON-PROBE: NOT DETECTED
FLUID TYPE: NORMAL
FLUID TYPE: NORMAL
FOLATE SERPL-MCNC: 11.2 NG/ML (ref 4.8–24.2)
G-TEG: 12.1 K D/SC (ref 4.5–11)
G-TEG: 12.5 K D/SC (ref 4.5–11)
G-TEG: 13.7 K D/SC (ref 4.5–11)
G-TEG: 16.9 K D/SC (ref 4.5–11)
GLUCOSE FLD-MCNC: 88 MG/DL
GLUCOSE SERPL-MCNC: 104 MG/DL (ref 74–99)
GLUCOSE SERPL-MCNC: 106 MG/DL (ref 74–99)
GLUCOSE SERPL-MCNC: 108 MG/DL (ref 74–99)
GLUCOSE SERPL-MCNC: 110 MG/DL (ref 74–99)
GLUCOSE SERPL-MCNC: 111 MG/DL (ref 74–99)
GLUCOSE SERPL-MCNC: 113 MG/DL (ref 74–99)
GLUCOSE SERPL-MCNC: 114 MG/DL (ref 74–99)
GLUCOSE SERPL-MCNC: 114 MG/DL (ref 74–99)
GLUCOSE SERPL-MCNC: 115 MG/DL (ref 74–99)
GLUCOSE SERPL-MCNC: 115 MG/DL (ref 74–99)
GLUCOSE SERPL-MCNC: 116 MG/DL (ref 74–99)
GLUCOSE SERPL-MCNC: 118 MG/DL (ref 74–99)
GLUCOSE SERPL-MCNC: 119 MG/DL (ref 74–99)
GLUCOSE SERPL-MCNC: 119 MG/DL (ref 74–99)
GLUCOSE SERPL-MCNC: 120 MG/DL (ref 74–99)
GLUCOSE SERPL-MCNC: 122 MG/DL (ref 74–99)
GLUCOSE SERPL-MCNC: 135 MG/DL (ref 74–99)
GLUCOSE SERPL-MCNC: 137 MG/DL (ref 74–99)
GLUCOSE SERPL-MCNC: 141 MG/DL (ref 74–99)
GLUCOSE SERPL-MCNC: 143 MG/DL (ref 74–99)
GLUCOSE SERPL-MCNC: 148 MG/DL (ref 74–99)
GLUCOSE SERPL-MCNC: 149 MG/DL (ref 74–99)
GLUCOSE SERPL-MCNC: 152 MG/DL (ref 74–99)
GLUCOSE SERPL-MCNC: 155 MG/DL (ref 74–99)
GLUCOSE SERPL-MCNC: 156 MG/DL (ref 74–99)
GLUCOSE SERPL-MCNC: 157 MG/DL (ref 74–99)
GLUCOSE SERPL-MCNC: 158 MG/DL (ref 74–99)
GLUCOSE SERPL-MCNC: 168 MG/DL (ref 74–99)
GLUCOSE SERPL-MCNC: 169 MG/DL (ref 74–99)
GLUCOSE SERPL-MCNC: 173 MG/DL (ref 74–99)
GLUCOSE SERPL-MCNC: 176 MG/DL (ref 74–99)
GLUCOSE SERPL-MCNC: 198 MG/DL (ref 74–99)
GLUCOSE SERPL-MCNC: 64 MG/DL (ref 74–99)
GLUCOSE SERPL-MCNC: 76 MG/DL (ref 74–99)
GLUCOSE SERPL-MCNC: 79 MG/DL (ref 74–99)
GLUCOSE SERPL-MCNC: 81 MG/DL (ref 74–99)
GLUCOSE SERPL-MCNC: 85 MG/DL (ref 74–99)
GLUCOSE SERPL-MCNC: 85 MG/DL (ref 74–99)
GLUCOSE SERPL-MCNC: 88 MG/DL (ref 74–99)
GLUCOSE SERPL-MCNC: 89 MG/DL (ref 74–99)
GLUCOSE SERPL-MCNC: 89 MG/DL (ref 74–99)
GLUCOSE SERPL-MCNC: 96 MG/DL (ref 74–99)
GLUCOSE SERPL-MCNC: 97 MG/DL (ref 74–99)
GLUCOSE SERPL-MCNC: 97 MG/DL (ref 74–99)
GRAM STAIN ORDERABLE: NORMAL
GRAM STN SPEC: NORMAL
GRAM STN SPEC: NORMAL
HADV DNA NPH QL NAA+NON-PROBE: NOT DETECTED
HAPTOGLOB SERPL-MCNC: 231 MG/DL (ref 30–200)
HBA1C MFR BLD: 5.2 % (ref 4–5.6)
HBV SURFACE AB SERPL IA-ACNC: REACTIVE M[IU]/ML
HBV SURFACE AG SERPL QL IA: NORMAL
HCO3: 19.9 MMOL/L (ref 22–26)
HCO3: 20.3 MMOL/L (ref 22–26)
HCO3: 21.1 MMOL/L (ref 22–26)
HCO3: 21.3 MMOL/L (ref 22–26)
HCO3: 21.5 MMOL/L (ref 22–26)
HCO3: 22.6 MMOL/L (ref 22–26)
HCO3: 24.8 MMOL/L (ref 22–26)
HCO3: 29.2 MMOL/L (ref 22–26)
HCO3: 29.3 MMOL/L (ref 22–26)
HCO3: 30.3 MMOL/L (ref 22–26)
HCO3: 30.5 MMOL/L (ref 22–26)
HCO3: 30.9 MMOL/L (ref 22–26)
HCO3: 31.3 MMOL/L (ref 22–26)
HCO3: 31.7 MMOL/L (ref 22–26)
HCO3: 31.7 MMOL/L (ref 22–26)
HCO3: 32 MMOL/L (ref 22–26)
HCO3: 32.1 MMOL/L (ref 22–26)
HCO3: 33.9 MMOL/L (ref 22–26)
HCO3: 34 MMOL/L (ref 22–26)
HCO3: 34.2 MMOL/L (ref 22–26)
HCO3: 35 MMOL/L (ref 22–26)
HCO3: 35.1 MMOL/L (ref 22–26)
HCO3: 35.8 MMOL/L (ref 22–26)
HCO3: 36 MMOL/L (ref 22–26)
HCO3: 36 MMOL/L (ref 22–26)
HCO3: 36.1 MMOL/L (ref 22–26)
HCO3: 36.2 MMOL/L (ref 22–26)
HCO3: 36.4 MMOL/L (ref 22–26)
HCO3: 36.4 MMOL/L (ref 22–26)
HCO3: 36.8 MMOL/L (ref 22–26)
HCO3: 37.8 MMOL/L (ref 22–26)
HCO3: 38.2 MMOL/L (ref 22–26)
HCO3: 38.7 MMOL/L (ref 22–26)
HCOV 229E RNA NPH QL NAA+NON-PROBE: NOT DETECTED
HCOV HKU1 RNA NPH QL NAA+NON-PROBE: NOT DETECTED
HCOV NL63 RNA NPH QL NAA+NON-PROBE: NOT DETECTED
HCOV OC43 RNA NPH QL NAA+NON-PROBE: NOT DETECTED
HCT VFR BLD AUTO: 20.8 % (ref 37–54)
HCT VFR BLD AUTO: 21.1 % (ref 37–54)
HCT VFR BLD AUTO: 21.6 % (ref 37–54)
HCT VFR BLD AUTO: 22.3 % (ref 37–54)
HCT VFR BLD AUTO: 22.7 % (ref 37–54)
HCT VFR BLD AUTO: 22.8 % (ref 37–54)
HCT VFR BLD AUTO: 23.4 % (ref 37–54)
HCT VFR BLD AUTO: 23.5 % (ref 37–54)
HCT VFR BLD AUTO: 23.8 % (ref 37–54)
HCT VFR BLD AUTO: 24 % (ref 37–54)
HCT VFR BLD AUTO: 24 % (ref 37–54)
HCT VFR BLD AUTO: 24.2 % (ref 37–54)
HCT VFR BLD AUTO: 24.3 % (ref 37–54)
HCT VFR BLD AUTO: 24.4 % (ref 37–54)
HCT VFR BLD AUTO: 24.5 % (ref 37–54)
HCT VFR BLD AUTO: 24.8 % (ref 37–54)
HCT VFR BLD AUTO: 24.9 % (ref 37–54)
HCT VFR BLD AUTO: 25 % (ref 37–54)
HCT VFR BLD AUTO: 25.1 % (ref 37–54)
HCT VFR BLD AUTO: 25.3 % (ref 37–54)
HCT VFR BLD AUTO: 25.5 % (ref 37–54)
HCT VFR BLD AUTO: 25.6 % (ref 37–54)
HCT VFR BLD AUTO: 26.7 % (ref 37–54)
HCT VFR BLD AUTO: 26.9 % (ref 37–54)
HCT VFR BLD AUTO: 27.3 % (ref 37–54)
HCT VFR BLD AUTO: 27.3 % (ref 37–54)
HCT VFR BLD AUTO: 27.5 % (ref 37–54)
HCT VFR BLD AUTO: 27.9 % (ref 37–54)
HCT VFR BLD AUTO: 28.2 % (ref 37–54)
HCT VFR BLD AUTO: 29.3 % (ref 37–54)
HCT VFR BLD AUTO: 33 % (ref 37–54)
HCT VFR BLD AUTO: 33 % (ref 37–54)
HCT VFR BLD AUTO: 33.1 % (ref 37–54)
HCT VFR BLD AUTO: 33.9 % (ref 37–54)
HCT VFR BLD AUTO: 36.5 % (ref 37–54)
HCT VFR BLD AUTO: 37.4 % (ref 37–54)
HCT VFR BLD AUTO: 38 % (ref 37–54)
HCT VFR BLD AUTO: 39.9 % (ref 37–54)
HGB BLD-MCNC: 10 G/DL (ref 12.5–16.5)
HGB BLD-MCNC: 10.1 G/DL (ref 12.5–16.5)
HGB BLD-MCNC: 10.9 G/DL (ref 12.5–16.5)
HGB BLD-MCNC: 11 G/DL (ref 12.5–16.5)
HGB BLD-MCNC: 11.1 G/DL (ref 12.5–16.5)
HGB BLD-MCNC: 12 G/DL (ref 12.5–16.5)
HGB BLD-MCNC: 6.4 G/DL (ref 12.5–16.5)
HGB BLD-MCNC: 6.5 G/DL (ref 12.5–16.5)
HGB BLD-MCNC: 6.5 G/DL (ref 12.5–16.5)
HGB BLD-MCNC: 6.8 G/DL (ref 12.5–16.5)
HGB BLD-MCNC: 6.9 G/DL (ref 12.5–16.5)
HGB BLD-MCNC: 6.9 G/DL (ref 12.5–16.5)
HGB BLD-MCNC: 7.1 G/DL (ref 12.5–16.5)
HGB BLD-MCNC: 7.2 G/DL (ref 12.5–16.5)
HGB BLD-MCNC: 7.2 G/DL (ref 12.5–16.5)
HGB BLD-MCNC: 7.3 G/DL (ref 12.5–16.5)
HGB BLD-MCNC: 7.4 G/DL (ref 12.5–16.5)
HGB BLD-MCNC: 7.6 G/DL (ref 12.5–16.5)
HGB BLD-MCNC: 7.6 G/DL (ref 12.5–16.5)
HGB BLD-MCNC: 7.7 G/DL (ref 12.5–16.5)
HGB BLD-MCNC: 7.8 G/DL (ref 12.5–16.5)
HGB BLD-MCNC: 7.9 G/DL (ref 12.5–16.5)
HGB BLD-MCNC: 8 G/DL (ref 12.5–16.5)
HGB BLD-MCNC: 8.1 G/DL (ref 12.5–16.5)
HGB BLD-MCNC: 8.1 G/DL (ref 12.5–16.5)
HGB BLD-MCNC: 8.2 G/DL (ref 12.5–16.5)
HGB BLD-MCNC: 8.3 G/DL (ref 12.5–16.5)
HGB BLD-MCNC: 8.4 G/DL (ref 12.5–16.5)
HGB BLD-MCNC: 8.5 G/DL (ref 12.5–16.5)
HGB BLD-MCNC: 8.7 G/DL (ref 12.5–16.5)
HGB BLD-MCNC: 8.9 G/DL (ref 12.5–16.5)
HGB BLD-MCNC: 9.8 G/DL (ref 12.5–16.5)
HGB BLD-MCNC: 9.8 G/DL (ref 12.5–16.5)
HHB: 0.8 % (ref 0–5)
HHB: 0.9 % (ref 0–5)
HHB: 1.1 % (ref 0–5)
HHB: 1.1 % (ref 0–5)
HHB: 1.2 % (ref 0–5)
HHB: 1.3 % (ref 0–5)
HHB: 1.3 % (ref 0–5)
HHB: 1.4 % (ref 0–5)
HHB: 1.4 % (ref 0–5)
HHB: 1.6 % (ref 0–5)
HHB: 1.9 % (ref 0–5)
HHB: 2 % (ref 0–5)
HHB: 2.3 % (ref 0–5)
HHB: 2.4 % (ref 0–5)
HHB: 2.5 % (ref 0–5)
HHB: 2.7 % (ref 0–5)
HHB: 2.8 % (ref 0–5)
HHB: 21.9 % (ref 0–5)
HHB: 3 % (ref 0–5)
HHB: 3.1 % (ref 0–5)
HHB: 3.3 % (ref 0–5)
HHB: 3.3 % (ref 0–5)
HHB: 3.8 % (ref 0–5)
HHB: 32.9 % (ref 0–5)
HHB: 5.6 % (ref 0–5)
HHB: 7.3 % (ref 0–5)
HHB: 7.7 % (ref 0–5)
HHB: 7.8 % (ref 0–5)
HHB: 8.8 % (ref 0–5)
HMPV RNA NPH QL NAA+NON-PROBE: NOT DETECTED
HOWELL-JOLLY BODIES: ABNORMAL
HPIV1 RNA NPH QL NAA+NON-PROBE: NOT DETECTED
HPIV2 RNA NPH QL NAA+NON-PROBE: NOT DETECTED
HPIV3 RNA NPH QL NAA+NON-PROBE: NOT DETECTED
HPIV4 RNA NPH QL NAA+NON-PROBE: NOT DETECTED
HYPOCHROMIA: ABNORMAL
IMM GRANULOCYTES # BLD: 0.02 E9/L
IMM GRANULOCYTES # BLD: 0.03 E9/L
IMM GRANULOCYTES # BLD: 0.03 E9/L
IMM GRANULOCYTES # BLD: 0.04 E9/L
IMM GRANULOCYTES # BLD: 0.08 E9/L
IMM GRANULOCYTES # BLD: 0.15 E9/L
IMM GRANULOCYTES # BLD: 0.2 E9/L
IMM GRANULOCYTES # BLD: 0.2 E9/L
IMM GRANULOCYTES # BLD: 0.22 E9/L
IMM GRANULOCYTES # BLD: 0.22 E9/L
IMM GRANULOCYTES # BLD: 0.25 E9/L
IMM GRANULOCYTES # BLD: 0.26 E9/L
IMM GRANULOCYTES # BLD: 0.27 E9/L
IMM GRANULOCYTES # BLD: 0.31 E9/L
IMM GRANULOCYTES # BLD: 0.37 E9/L
IMM GRANULOCYTES # BLD: 0.39 E9/L
IMM GRANULOCYTES # BLD: 0.48 E9/L
IMM GRANULOCYTES NFR BLD: 0.3 % (ref 0–5)
IMM GRANULOCYTES NFR BLD: 0.5 % (ref 0–5)
IMM GRANULOCYTES NFR BLD: 0.9 % (ref 0–5)
IMM GRANULOCYTES NFR BLD: 1.1 % (ref 0–5)
IMM GRANULOCYTES NFR BLD: 1.4 % (ref 0–5)
IMM GRANULOCYTES NFR BLD: 1.5 % (ref 0–5)
IMM GRANULOCYTES NFR BLD: 1.8 % (ref 0–5)
IMM GRANULOCYTES NFR BLD: 1.9 % (ref 0–5)
IMM GRANULOCYTES NFR BLD: 1.9 % (ref 0–5)
IMM GRANULOCYTES NFR BLD: 2 % (ref 0–5)
IMM GRANULOCYTES NFR BLD: 2.6 % (ref 0–5)
IMM GRANULOCYTES NFR BLD: 2.7 % (ref 0–5)
IMM GRANULOCYTES NFR BLD: 2.9 % (ref 0–5)
IMM GRANULOCYTES NFR BLD: 3 % (ref 0–5)
IMM GRANULOCYTES NFR BLD: 3.2 % (ref 0–5)
IMMATURE RETIC FRACT: 38.6 % (ref 2.3–13.4)
INR BLD: 1.3
INR BLD: 1.6
IRON SATN MFR SERPL: 16 % (ref 20–55)
IRON SERPL-MCNC: 39 MCG/DL (ref 59–158)
K (CLOTTING TIME): 0.8 MIN (ref 1–3)
K (CLOTTING TIME): 1 MIN (ref 1–3)
K (CLOTTING TIME): 1 MIN (ref 1–3)
K (CLOTTING TIME): 1.1 MIN (ref 1–3)
LAB: ABNORMAL
LACTATE BLDV-SCNC: 1.6 MMOL/L (ref 0.5–2.2)
LACTATE BLDV-SCNC: 1.7 MMOL/L (ref 0.5–2.2)
LDH FLD L TO P-CCNC: 112 U/L
LDH SERPL-CCNC: 211 U/L (ref 135–225)
LEGIONELLA AG UR QL: NORMAL
LV EF: 33 %
LVEF MODALITY: NORMAL
LY30 (FIBRINOLYSIS): 0 % (ref 0–8)
LYMPHOCYTES # BLD: 0.18 E9/L (ref 1.5–4)
LYMPHOCYTES # BLD: 0.28 E9/L (ref 1.5–4)
LYMPHOCYTES # BLD: 0.31 E9/L (ref 1.5–4)
LYMPHOCYTES # BLD: 0.33 E9/L (ref 1.5–4)
LYMPHOCYTES # BLD: 0.35 E9/L (ref 1.5–4)
LYMPHOCYTES # BLD: 0.36 E9/L (ref 1.5–4)
LYMPHOCYTES # BLD: 0.37 E9/L (ref 1.5–4)
LYMPHOCYTES # BLD: 0.42 E9/L (ref 1.5–4)
LYMPHOCYTES # BLD: 0.45 E9/L (ref 1.5–4)
LYMPHOCYTES # BLD: 0.46 E9/L (ref 1.5–4)
LYMPHOCYTES # BLD: 0.5 E9/L (ref 1.5–4)
LYMPHOCYTES # BLD: 0.53 E9/L (ref 1.5–4)
LYMPHOCYTES # BLD: 0.56 E9/L (ref 1.5–4)
LYMPHOCYTES # BLD: 0.57 E9/L (ref 1.5–4)
LYMPHOCYTES # BLD: 0.59 E9/L (ref 1.5–4)
LYMPHOCYTES # BLD: 0.6 E9/L (ref 1.5–4)
LYMPHOCYTES # BLD: 0.6 E9/L (ref 1.5–4)
LYMPHOCYTES # BLD: 0.61 E9/L (ref 1.5–4)
LYMPHOCYTES # BLD: 0.62 E9/L (ref 1.5–4)
LYMPHOCYTES # BLD: 0.62 E9/L (ref 1.5–4)
LYMPHOCYTES # BLD: 0.63 E9/L (ref 1.5–4)
LYMPHOCYTES # BLD: 0.65 E9/L (ref 1.5–4)
LYMPHOCYTES # BLD: 0.66 E9/L (ref 1.5–4)
LYMPHOCYTES # BLD: 0.67 E9/L (ref 1.5–4)
LYMPHOCYTES # BLD: 0.69 E9/L (ref 1.5–4)
LYMPHOCYTES # BLD: 0.7 E9/L (ref 1.5–4)
LYMPHOCYTES # BLD: 0.73 E9/L (ref 1.5–4)
LYMPHOCYTES # BLD: 0.79 E9/L (ref 1.5–4)
LYMPHOCYTES # BLD: 0.88 E9/L (ref 1.5–4)
LYMPHOCYTES # BLD: 1.17 E9/L (ref 1.5–4)
LYMPHOCYTES # BLD: 1.24 E9/L (ref 1.5–4)
LYMPHOCYTES # BLD: 1.24 E9/L (ref 1.5–4)
LYMPHOCYTES NFR BLD: 1.7 % (ref 20–42)
LYMPHOCYTES NFR BLD: 1.7 % (ref 20–42)
LYMPHOCYTES NFR BLD: 10.5 % (ref 20–42)
LYMPHOCYTES NFR BLD: 10.5 % (ref 20–42)
LYMPHOCYTES NFR BLD: 2.6 % (ref 20–42)
LYMPHOCYTES NFR BLD: 3.5 % (ref 20–42)
LYMPHOCYTES NFR BLD: 4.3 % (ref 20–42)
LYMPHOCYTES NFR BLD: 4.4 % (ref 20–42)
LYMPHOCYTES NFR BLD: 4.8 % (ref 20–42)
LYMPHOCYTES NFR BLD: 5 % (ref 20–42)
LYMPHOCYTES NFR BLD: 5.2 % (ref 20–42)
LYMPHOCYTES NFR BLD: 5.4 % (ref 20–42)
LYMPHOCYTES NFR BLD: 5.6 % (ref 20–42)
LYMPHOCYTES NFR BLD: 6 % (ref 20–42)
LYMPHOCYTES NFR BLD: 6.1 % (ref 20–42)
LYMPHOCYTES NFR BLD: 6.9 % (ref 20–42)
LYMPHOCYTES NFR BLD: 7 % (ref 20–42)
LYMPHOCYTES NFR BLD: 7.8 % (ref 20–42)
LYMPHOCYTES NFR BLD: 7.8 % (ref 20–42)
LYMPHOCYTES NFR BLD: 8.7 % (ref 20–42)
LYMPHOCYTES NFR BLD: 9 % (ref 20–42)
LYMPHOCYTES NFR BLD: 9.8 % (ref 20–42)
Lab: ABNORMAL
M PNEUMO DNA NPH QL NAA+NON-PROBE: NOT DETECTED
MA (MAX AMPLITUDE): 70.8 MM (ref 50–70)
MA (MAX AMPLITUDE): 71.4 MM (ref 50–70)
MA (MAX AMPLITUDE): 73.2 MM (ref 50–70)
MA (MAX AMPLITUDE): 77.2 MM (ref 50–70)
MAGNESIUM SERPL-MCNC: 1.6 MG/DL (ref 1.6–2.6)
MAGNESIUM SERPL-MCNC: 1.7 MG/DL (ref 1.6–2.6)
MAGNESIUM SERPL-MCNC: 1.7 MG/DL (ref 1.6–2.6)
MAGNESIUM SERPL-MCNC: 1.8 MG/DL (ref 1.6–2.6)
MAGNESIUM SERPL-MCNC: 1.8 MG/DL (ref 1.6–2.6)
MAGNESIUM SERPL-MCNC: 1.9 MG/DL (ref 1.6–2.6)
MAGNESIUM SERPL-MCNC: 2 MG/DL (ref 1.6–2.6)
MAGNESIUM SERPL-MCNC: 2.1 MG/DL (ref 1.6–2.6)
MAGNESIUM SERPL-MCNC: 2.2 MG/DL (ref 1.6–2.6)
MAGNESIUM SERPL-MCNC: 2.3 MG/DL (ref 1.6–2.6)
MAGNESIUM SERPL-MCNC: 2.4 MG/DL (ref 1.6–2.6)
MAGNESIUM SERPL-MCNC: 2.5 MG/DL (ref 1.6–2.6)
MAGNESIUM SERPL-MCNC: 2.6 MG/DL (ref 1.6–2.6)
MAGNESIUM SERPL-MCNC: 2.7 MG/DL (ref 1.6–2.6)
MAGNESIUM SERPL-MCNC: 2.9 MG/DL (ref 1.6–2.6)
MCH RBC QN AUTO: 29.9 PG (ref 26–35)
MCH RBC QN AUTO: 30.1 PG (ref 26–35)
MCH RBC QN AUTO: 30.1 PG (ref 26–35)
MCH RBC QN AUTO: 30.2 PG (ref 26–35)
MCH RBC QN AUTO: 30.3 PG (ref 26–35)
MCH RBC QN AUTO: 30.4 PG (ref 26–35)
MCH RBC QN AUTO: 30.4 PG (ref 26–35)
MCH RBC QN AUTO: 30.5 PG (ref 26–35)
MCH RBC QN AUTO: 30.6 PG (ref 26–35)
MCH RBC QN AUTO: 30.8 PG (ref 26–35)
MCH RBC QN AUTO: 30.9 PG (ref 26–35)
MCH RBC QN AUTO: 31 PG (ref 26–35)
MCH RBC QN AUTO: 31.1 PG (ref 26–35)
MCH RBC QN AUTO: 31.2 PG (ref 26–35)
MCH RBC QN AUTO: 31.2 PG (ref 26–35)
MCH RBC QN AUTO: 31.4 PG (ref 26–35)
MCH RBC QN AUTO: 31.4 PG (ref 26–35)
MCH RBC QN AUTO: 31.5 PG (ref 26–35)
MCH RBC QN AUTO: 31.5 PG (ref 26–35)
MCH RBC QN AUTO: 31.6 PG (ref 26–35)
MCH RBC QN AUTO: 31.8 PG (ref 26–35)
MCH RBC QN AUTO: 31.9 PG (ref 26–35)
MCH RBC QN AUTO: 32.1 PG (ref 26–35)
MCH RBC QN AUTO: 32.3 PG (ref 26–35)
MCH RBC QN AUTO: 32.5 PG (ref 26–35)
MCHC RBC AUTO-ENTMCNC: 28.9 % (ref 32–34.5)
MCHC RBC AUTO-ENTMCNC: 29.1 % (ref 32–34.5)
MCHC RBC AUTO-ENTMCNC: 29.2 % (ref 32–34.5)
MCHC RBC AUTO-ENTMCNC: 29.7 % (ref 32–34.5)
MCHC RBC AUTO-ENTMCNC: 29.8 % (ref 32–34.5)
MCHC RBC AUTO-ENTMCNC: 29.9 % (ref 32–34.5)
MCHC RBC AUTO-ENTMCNC: 30 % (ref 32–34.5)
MCHC RBC AUTO-ENTMCNC: 30 % (ref 32–34.5)
MCHC RBC AUTO-ENTMCNC: 30.1 % (ref 32–34.5)
MCHC RBC AUTO-ENTMCNC: 30.1 % (ref 32–34.5)
MCHC RBC AUTO-ENTMCNC: 30.2 % (ref 32–34.5)
MCHC RBC AUTO-ENTMCNC: 30.3 % (ref 32–34.5)
MCHC RBC AUTO-ENTMCNC: 30.4 % (ref 32–34.5)
MCHC RBC AUTO-ENTMCNC: 30.5 % (ref 32–34.5)
MCHC RBC AUTO-ENTMCNC: 30.6 % (ref 32–34.5)
MCHC RBC AUTO-ENTMCNC: 30.6 % (ref 32–34.5)
MCHC RBC AUTO-ENTMCNC: 30.8 % (ref 32–34.5)
MCHC RBC AUTO-ENTMCNC: 30.9 % (ref 32–34.5)
MCHC RBC AUTO-ENTMCNC: 30.9 % (ref 32–34.5)
MCHC RBC AUTO-ENTMCNC: 31.1 % (ref 32–34.5)
MCHC RBC AUTO-ENTMCNC: 31.5 % (ref 32–34.5)
MCHC RBC AUTO-ENTMCNC: 31.9 % (ref 32–34.5)
MCHC RBC AUTO-ENTMCNC: 32 % (ref 32–34.5)
MCHC RBC AUTO-ENTMCNC: 32.5 % (ref 32–34.5)
MCHC RBC AUTO-ENTMCNC: 32.9 % (ref 32–34.5)
MCV RBC AUTO: 100 FL (ref 80–99.9)
MCV RBC AUTO: 100 FL (ref 80–99.9)
MCV RBC AUTO: 100.4 FL (ref 80–99.9)
MCV RBC AUTO: 100.8 FL (ref 80–99.9)
MCV RBC AUTO: 100.9 FL (ref 80–99.9)
MCV RBC AUTO: 100.9 FL (ref 80–99.9)
MCV RBC AUTO: 102.2 FL (ref 80–99.9)
MCV RBC AUTO: 102.6 FL (ref 80–99.9)
MCV RBC AUTO: 102.8 FL (ref 80–99.9)
MCV RBC AUTO: 103.2 FL (ref 80–99.9)
MCV RBC AUTO: 103.3 FL (ref 80–99.9)
MCV RBC AUTO: 104.7 FL (ref 80–99.9)
MCV RBC AUTO: 105.1 FL (ref 80–99.9)
MCV RBC AUTO: 105.8 FL (ref 80–99.9)
MCV RBC AUTO: 106.5 FL (ref 80–99.9)
MCV RBC AUTO: 106.7 FL (ref 80–99.9)
MCV RBC AUTO: 106.9 FL (ref 80–99.9)
MCV RBC AUTO: 107.7 FL (ref 80–99.9)
MCV RBC AUTO: 108.1 FL (ref 80–99.9)
MCV RBC AUTO: 109.3 FL (ref 80–99.9)
MCV RBC AUTO: 109.8 FL (ref 80–99.9)
MCV RBC AUTO: 94.5 FL (ref 80–99.9)
MCV RBC AUTO: 95.2 FL (ref 80–99.9)
MCV RBC AUTO: 95.5 FL (ref 80–99.9)
MCV RBC AUTO: 96.5 FL (ref 80–99.9)
MCV RBC AUTO: 97.9 FL (ref 80–99.9)
MCV RBC AUTO: 97.9 FL (ref 80–99.9)
MCV RBC AUTO: 98.2 FL (ref 80–99.9)
MCV RBC AUTO: 98.6 FL (ref 80–99.9)
MCV RBC AUTO: 99.2 FL (ref 80–99.9)
MCV RBC AUTO: 99.2 FL (ref 80–99.9)
MCV RBC AUTO: 99.3 FL (ref 80–99.9)
MCV RBC AUTO: 99.6 FL (ref 80–99.9)
METAMYELOCYTES NFR BLD MANUAL: 0.9 % (ref 0–1)
METER GLUCOSE: 100 MG/DL (ref 74–99)
METER GLUCOSE: 104 MG/DL (ref 74–99)
METER GLUCOSE: 105 MG/DL (ref 74–99)
METER GLUCOSE: 106 MG/DL (ref 74–99)
METER GLUCOSE: 107 MG/DL (ref 74–99)
METER GLUCOSE: 108 MG/DL (ref 74–99)
METER GLUCOSE: 108 MG/DL (ref 74–99)
METER GLUCOSE: 109 MG/DL (ref 74–99)
METER GLUCOSE: 112 MG/DL (ref 74–99)
METER GLUCOSE: 113 MG/DL (ref 74–99)
METER GLUCOSE: 115 MG/DL (ref 74–99)
METER GLUCOSE: 116 MG/DL (ref 74–99)
METER GLUCOSE: 116 MG/DL (ref 74–99)
METER GLUCOSE: 117 MG/DL (ref 74–99)
METER GLUCOSE: 118 MG/DL (ref 74–99)
METER GLUCOSE: 118 MG/DL (ref 74–99)
METER GLUCOSE: 119 MG/DL (ref 74–99)
METER GLUCOSE: 119 MG/DL (ref 74–99)
METER GLUCOSE: 120 MG/DL (ref 74–99)
METER GLUCOSE: 122 MG/DL (ref 74–99)
METER GLUCOSE: 123 MG/DL (ref 74–99)
METER GLUCOSE: 123 MG/DL (ref 74–99)
METER GLUCOSE: 124 MG/DL (ref 74–99)
METER GLUCOSE: 125 MG/DL (ref 74–99)
METER GLUCOSE: 126 MG/DL (ref 74–99)
METER GLUCOSE: 127 MG/DL (ref 74–99)
METER GLUCOSE: 128 MG/DL (ref 74–99)
METER GLUCOSE: 128 MG/DL (ref 74–99)
METER GLUCOSE: 129 MG/DL (ref 74–99)
METER GLUCOSE: 130 MG/DL (ref 74–99)
METER GLUCOSE: 131 MG/DL (ref 74–99)
METER GLUCOSE: 132 MG/DL (ref 74–99)
METER GLUCOSE: 132 MG/DL (ref 74–99)
METER GLUCOSE: 133 MG/DL (ref 74–99)
METER GLUCOSE: 134 MG/DL (ref 74–99)
METER GLUCOSE: 135 MG/DL (ref 74–99)
METER GLUCOSE: 135 MG/DL (ref 74–99)
METER GLUCOSE: 136 MG/DL (ref 74–99)
METER GLUCOSE: 137 MG/DL (ref 74–99)
METER GLUCOSE: 138 MG/DL (ref 74–99)
METER GLUCOSE: 139 MG/DL (ref 74–99)
METER GLUCOSE: 140 MG/DL (ref 74–99)
METER GLUCOSE: 141 MG/DL (ref 74–99)
METER GLUCOSE: 142 MG/DL (ref 74–99)
METER GLUCOSE: 143 MG/DL (ref 74–99)
METER GLUCOSE: 145 MG/DL (ref 74–99)
METER GLUCOSE: 146 MG/DL (ref 74–99)
METER GLUCOSE: 146 MG/DL (ref 74–99)
METER GLUCOSE: 147 MG/DL (ref 74–99)
METER GLUCOSE: 147 MG/DL (ref 74–99)
METER GLUCOSE: 148 MG/DL (ref 74–99)
METER GLUCOSE: 149 MG/DL (ref 74–99)
METER GLUCOSE: 149 MG/DL (ref 74–99)
METER GLUCOSE: 151 MG/DL (ref 74–99)
METER GLUCOSE: 151 MG/DL (ref 74–99)
METER GLUCOSE: 152 MG/DL (ref 74–99)
METER GLUCOSE: 153 MG/DL (ref 74–99)
METER GLUCOSE: 154 MG/DL (ref 74–99)
METER GLUCOSE: 155 MG/DL (ref 74–99)
METER GLUCOSE: 158 MG/DL (ref 74–99)
METER GLUCOSE: 158 MG/DL (ref 74–99)
METER GLUCOSE: 160 MG/DL (ref 74–99)
METER GLUCOSE: 163 MG/DL (ref 74–99)
METER GLUCOSE: 163 MG/DL (ref 74–99)
METER GLUCOSE: 165 MG/DL (ref 74–99)
METER GLUCOSE: 169 MG/DL (ref 74–99)
METER GLUCOSE: 169 MG/DL (ref 74–99)
METER GLUCOSE: 170 MG/DL (ref 74–99)
METER GLUCOSE: 175 MG/DL (ref 74–99)
METER GLUCOSE: 179 MG/DL (ref 74–99)
METER GLUCOSE: 182 MG/DL (ref 74–99)
METER GLUCOSE: 185 MG/DL (ref 74–99)
METER GLUCOSE: 185 MG/DL (ref 74–99)
METER GLUCOSE: 194 MG/DL (ref 74–99)
METER GLUCOSE: 207 MG/DL (ref 74–99)
METER GLUCOSE: 214 MG/DL (ref 74–99)
METER GLUCOSE: 42 MG/DL (ref 74–99)
METER GLUCOSE: 58 MG/DL (ref 74–99)
METER GLUCOSE: 64 MG/DL (ref 74–99)
METER GLUCOSE: 65 MG/DL (ref 74–99)
METER GLUCOSE: 67 MG/DL (ref 74–99)
METER GLUCOSE: 76 MG/DL (ref 74–99)
METER GLUCOSE: 78 MG/DL (ref 74–99)
METER GLUCOSE: 83 MG/DL (ref 74–99)
METER GLUCOSE: 84 MG/DL (ref 74–99)
METER GLUCOSE: 84 MG/DL (ref 74–99)
METER GLUCOSE: 85 MG/DL (ref 74–99)
METER GLUCOSE: 88 MG/DL (ref 74–99)
METER GLUCOSE: 90 MG/DL (ref 74–99)
METER GLUCOSE: 91 MG/DL (ref 74–99)
METER GLUCOSE: 92 MG/DL (ref 74–99)
METER GLUCOSE: 93 MG/DL (ref 74–99)
METER GLUCOSE: 95 MG/DL (ref 74–99)
METER GLUCOSE: 95 MG/DL (ref 74–99)
METER GLUCOSE: 96 MG/DL (ref 74–99)
METER GLUCOSE: 97 MG/DL (ref 74–99)
METHB: 0.1 % (ref 0–1.5)
METHB: 0.2 % (ref 0–1.5)
METHB: 0.3 % (ref 0–1.5)
METHB: 0.4 % (ref 0–1.5)
METHB: 0.5 % (ref 0–1.5)
METHB: 0.6 % (ref 0–1.5)
METHB: 0.8 % (ref 0–1.5)
MODE: ABNORMAL
MODE: AC
MONOCYTE, FLUID: 56 %
MONOCYTE, FLUID: 82 %
MONOCYTES # BLD: 0.22 E9/L (ref 0.1–0.95)
MONOCYTES # BLD: 0.23 E9/L (ref 0.1–0.95)
MONOCYTES # BLD: 0.31 E9/L (ref 0.1–0.95)
MONOCYTES # BLD: 0.4 E9/L (ref 0.1–0.95)
MONOCYTES # BLD: 0.42 E9/L (ref 0.1–0.95)
MONOCYTES # BLD: 0.46 E9/L (ref 0.1–0.95)
MONOCYTES # BLD: 0.52 E9/L (ref 0.1–0.95)
MONOCYTES # BLD: 0.57 E9/L (ref 0.1–0.95)
MONOCYTES # BLD: 0.61 E9/L (ref 0.1–0.95)
MONOCYTES # BLD: 0.62 E9/L (ref 0.1–0.95)
MONOCYTES # BLD: 0.68 E9/L (ref 0.1–0.95)
MONOCYTES # BLD: 0.68 E9/L (ref 0.1–0.95)
MONOCYTES # BLD: 0.7 E9/L (ref 0.1–0.95)
MONOCYTES # BLD: 0.76 E9/L (ref 0.1–0.95)
MONOCYTES # BLD: 0.76 E9/L (ref 0.1–0.95)
MONOCYTES # BLD: 0.77 E9/L (ref 0.1–0.95)
MONOCYTES # BLD: 0.82 E9/L (ref 0.1–0.95)
MONOCYTES # BLD: 0.83 E9/L (ref 0.1–0.95)
MONOCYTES # BLD: 0.84 E9/L (ref 0.1–0.95)
MONOCYTES # BLD: 0.85 E9/L (ref 0.1–0.95)
MONOCYTES # BLD: 0.93 E9/L (ref 0.1–0.95)
MONOCYTES # BLD: 0.94 E9/L (ref 0.1–0.95)
MONOCYTES # BLD: 0.96 E9/L (ref 0.1–0.95)
MONOCYTES # BLD: 0.99 E9/L (ref 0.1–0.95)
MONOCYTES # BLD: 0.99 E9/L (ref 0.1–0.95)
MONOCYTES # BLD: 1.02 E9/L (ref 0.1–0.95)
MONOCYTES # BLD: 1.04 E9/L (ref 0.1–0.95)
MONOCYTES # BLD: 1.07 E9/L (ref 0.1–0.95)
MONOCYTES # BLD: 1.15 E9/L (ref 0.1–0.95)
MONOCYTES # BLD: 1.18 E9/L (ref 0.1–0.95)
MONOCYTES # BLD: 1.34 E9/L (ref 0.1–0.95)
MONOCYTES # BLD: 1.43 E9/L (ref 0.1–0.95)
MONOCYTES NFR BLD: 1.7 % (ref 2–12)
MONOCYTES NFR BLD: 1.7 % (ref 2–12)
MONOCYTES NFR BLD: 1.8 % (ref 2–12)
MONOCYTES NFR BLD: 10 % (ref 2–12)
MONOCYTES NFR BLD: 10.2 % (ref 2–12)
MONOCYTES NFR BLD: 10.8 % (ref 2–12)
MONOCYTES NFR BLD: 11.3 % (ref 2–12)
MONOCYTES NFR BLD: 12.3 % (ref 2–12)
MONOCYTES NFR BLD: 12.4 % (ref 2–12)
MONOCYTES NFR BLD: 2.6 % (ref 2–12)
MONOCYTES NFR BLD: 3.5 % (ref 2–12)
MONOCYTES NFR BLD: 4.3 % (ref 2–12)
MONOCYTES NFR BLD: 4.3 % (ref 2–12)
MONOCYTES NFR BLD: 5.4 % (ref 2–12)
MONOCYTES NFR BLD: 5.8 % (ref 2–12)
MONOCYTES NFR BLD: 5.9 % (ref 2–12)
MONOCYTES NFR BLD: 6.1 % (ref 2–12)
MONOCYTES NFR BLD: 6.1 % (ref 2–12)
MONOCYTES NFR BLD: 6.5 % (ref 2–12)
MONOCYTES NFR BLD: 6.9 % (ref 2–12)
MONOCYTES NFR BLD: 7 % (ref 2–12)
MONOCYTES NFR BLD: 7 % (ref 2–12)
MONOCYTES NFR BLD: 7.6 % (ref 2–12)
MONOCYTES NFR BLD: 8.3 % (ref 2–12)
MONOCYTES NFR BLD: 8.7 % (ref 2–12)
MONOCYTES NFR BLD: 8.8 % (ref 2–12)
MONOCYTES NFR BLD: 9.3 % (ref 2–12)
MONOCYTES NFR BLD: 9.4 % (ref 2–12)
MONOCYTES NFR BLD: 9.4 % (ref 2–12)
MONOCYTES NFR BLD: 9.7 % (ref 2–12)
MRSA SPEC QL CULT: NORMAL
MYELOCYTES NFR BLD MANUAL: 0.9 % (ref 0–0)
MYELOCYTES NFR BLD MANUAL: 1.7 % (ref 0–0)
MYELOCYTES NFR BLD MANUAL: 1.7 % (ref 0–0)
MYELOCYTES NFR BLD MANUAL: 1.8 % (ref 0–0)
NEUTROPHIL, FLUID: 18 %
NEUTROPHIL, FLUID: 44 %
NEUTROPHILS # BLD: 10.29 E9/L (ref 1.8–7.3)
NEUTROPHILS # BLD: 10.42 E9/L (ref 1.8–7.3)
NEUTROPHILS # BLD: 10.53 E9/L (ref 1.8–7.3)
NEUTROPHILS # BLD: 10.56 E9/L (ref 1.8–7.3)
NEUTROPHILS # BLD: 10.89 E9/L (ref 1.8–7.3)
NEUTROPHILS # BLD: 10.96 E9/L (ref 1.8–7.3)
NEUTROPHILS # BLD: 11.23 E9/L (ref 1.8–7.3)
NEUTROPHILS # BLD: 11.31 E9/L (ref 1.8–7.3)
NEUTROPHILS # BLD: 11.83 E9/L (ref 1.8–7.3)
NEUTROPHILS # BLD: 12.12 E9/L (ref 1.8–7.3)
NEUTROPHILS # BLD: 12.21 E9/L (ref 1.8–7.3)
NEUTROPHILS # BLD: 12.24 E9/L (ref 1.8–7.3)
NEUTROPHILS # BLD: 12.56 E9/L (ref 1.8–7.3)
NEUTROPHILS # BLD: 12.63 E9/L (ref 1.8–7.3)
NEUTROPHILS # BLD: 13.98 E9/L (ref 1.8–7.3)
NEUTROPHILS # BLD: 20.06 E9/L (ref 1.8–7.3)
NEUTROPHILS # BLD: 4.23 E9/L (ref 1.8–7.3)
NEUTROPHILS # BLD: 4.51 E9/L (ref 1.8–7.3)
NEUTROPHILS # BLD: 4.99 E9/L (ref 1.8–7.3)
NEUTROPHILS # BLD: 5.85 E9/L (ref 1.8–7.3)
NEUTROPHILS # BLD: 6.38 E9/L (ref 1.8–7.3)
NEUTROPHILS # BLD: 7.55 E9/L (ref 1.8–7.3)
NEUTROPHILS # BLD: 7.83 E9/L (ref 1.8–7.3)
NEUTROPHILS # BLD: 7.89 E9/L (ref 1.8–7.3)
NEUTROPHILS # BLD: 8.32 E9/L (ref 1.8–7.3)
NEUTROPHILS # BLD: 8.5 E9/L (ref 1.8–7.3)
NEUTROPHILS # BLD: 8.77 E9/L (ref 1.8–7.3)
NEUTROPHILS # BLD: 9.05 E9/L (ref 1.8–7.3)
NEUTROPHILS # BLD: 9.38 E9/L (ref 1.8–7.3)
NEUTROPHILS # BLD: 9.46 E9/L (ref 1.8–7.3)
NEUTROPHILS # BLD: 9.88 E9/L (ref 1.8–7.3)
NEUTROPHILS # BLD: 9.9 E9/L (ref 1.8–7.3)
NEUTS SEG NFR BLD: 73.9 % (ref 43–80)
NEUTS SEG NFR BLD: 75 % (ref 43–80)
NEUTS SEG NFR BLD: 78.4 % (ref 43–80)
NEUTS SEG NFR BLD: 78.7 % (ref 43–80)
NEUTS SEG NFR BLD: 79.7 % (ref 43–80)
NEUTS SEG NFR BLD: 80 % (ref 43–80)
NEUTS SEG NFR BLD: 80.5 % (ref 43–80)
NEUTS SEG NFR BLD: 80.7 % (ref 43–80)
NEUTS SEG NFR BLD: 80.7 % (ref 43–80)
NEUTS SEG NFR BLD: 80.8 % (ref 43–80)
NEUTS SEG NFR BLD: 80.9 % (ref 43–80)
NEUTS SEG NFR BLD: 80.9 % (ref 43–80)
NEUTS SEG NFR BLD: 82.3 % (ref 43–80)
NEUTS SEG NFR BLD: 83 % (ref 43–80)
NEUTS SEG NFR BLD: 83.1 % (ref 43–80)
NEUTS SEG NFR BLD: 83.5 % (ref 43–80)
NEUTS SEG NFR BLD: 84.6 % (ref 43–80)
NEUTS SEG NFR BLD: 85 % (ref 43–80)
NEUTS SEG NFR BLD: 85.2 % (ref 43–80)
NEUTS SEG NFR BLD: 86.9 % (ref 43–80)
NEUTS SEG NFR BLD: 87 % (ref 43–80)
NEUTS SEG NFR BLD: 87.4 % (ref 43–80)
NEUTS SEG NFR BLD: 89 % (ref 43–80)
NEUTS SEG NFR BLD: 89.6 % (ref 43–80)
NEUTS SEG NFR BLD: 90.4 % (ref 43–80)
NEUTS SEG NFR BLD: 93 % (ref 43–80)
NEUTS SEG NFR BLD: 95.6 % (ref 43–80)
NEUTS SEG NFR BLD: 95.7 % (ref 43–80)
NRBC BLD-RTO: 0.9 /100 WBC
NRBC BLD-RTO: 1.7 /100 WBC
NRBC BLD-RTO: 2.6 /100 WBC
NUCLEATED CELLS FLUID: 205 /UL
NUCLEATED CELLS FLUID: 247 /UL
O2 CONTENT: 10.5 ML/DL
O2 CONTENT: 10.6 ML/DL
O2 CONTENT: 11.1 ML/DL
O2 CONTENT: 11.5 ML/DL
O2 CONTENT: 11.5 ML/DL
O2 CONTENT: 11.6 ML/DL
O2 CONTENT: 11.9 ML/DL
O2 CONTENT: 12.2 ML/DL
O2 CONTENT: 12.2 ML/DL
O2 CONTENT: 12.5 ML/DL
O2 CONTENT: 12.7 ML/DL
O2 CONTENT: 12.7 ML/DL
O2 CONTENT: 12.9 ML/DL
O2 CONTENT: 13 ML/DL
O2 CONTENT: 13.2 ML/DL
O2 CONTENT: 13.2 ML/DL
O2 CONTENT: 13.3 ML/DL
O2 CONTENT: 13.5 ML/DL
O2 CONTENT: 13.8 ML/DL
O2 CONTENT: 14.1 ML/DL
O2 CONTENT: 14.3 ML/DL
O2 CONTENT: 14.6 ML/DL
O2 CONTENT: 14.7 ML/DL
O2 CONTENT: 14.7 ML/DL
O2 CONTENT: 14.8 ML/DL
O2 CONTENT: 14.8 ML/DL
O2 CONTENT: 15 ML/DL
O2 CONTENT: 15.2 ML/DL
O2 CONTENT: 15.5 ML/DL
O2 CONTENT: 8.3 ML/DL
O2 SATURATION: 66.6 % (ref 92–98.5)
O2 SATURATION: 77.8 % (ref 92–98.5)
O2 SATURATION: 91 % (ref 92–98.5)
O2 SATURATION: 92.1 % (ref 92–98.5)
O2 SATURATION: 92.1 % (ref 92–98.5)
O2 SATURATION: 92.6 % (ref 92–98.5)
O2 SATURATION: 94.3 % (ref 92–98.5)
O2 SATURATION: 96.1 % (ref 92–98.5)
O2 SATURATION: 96.6 % (ref 92–98.5)
O2 SATURATION: 96.7 % (ref 92–98.5)
O2 SATURATION: 96.8 % (ref 92–98.5)
O2 SATURATION: 97 % (ref 92–98.5)
O2 SATURATION: 97.2 % (ref 92–98.5)
O2 SATURATION: 97.3 % (ref 92–98.5)
O2 SATURATION: 97.5 % (ref 92–98.5)
O2 SATURATION: 97.6 % (ref 92–98.5)
O2 SATURATION: 97.7 % (ref 92–98.5)
O2 SATURATION: 98 % (ref 92–98.5)
O2 SATURATION: 98.1 % (ref 92–98.5)
O2 SATURATION: 98.4 % (ref 92–98.5)
O2 SATURATION: 98.6 % (ref 92–98.5)
O2 SATURATION: 98.6 % (ref 92–98.5)
O2 SATURATION: 98.7 % (ref 92–98.5)
O2 SATURATION: 98.7 % (ref 92–98.5)
O2 SATURATION: 98.8 % (ref 92–98.5)
O2 SATURATION: 98.9 % (ref 92–98.5)
O2 SATURATION: 98.9 % (ref 92–98.5)
O2 SATURATION: 99.1 % (ref 92–98.5)
O2 SATURATION: 99.2 % (ref 92–98.5)
O2 SATURATION: 99.6 % (ref 92–98.5)
O2HB: 65.6 % (ref 94–97)
O2HB: 76.8 % (ref 94–97)
O2HB: 89 % (ref 94–97)
O2HB: 89.3 % (ref 94–97)
O2HB: 90.9 % (ref 94–97)
O2HB: 91 % (ref 94–97)
O2HB: 92.7 % (ref 94–97)
O2HB: 93.4 % (ref 94–97)
O2HB: 94.5 % (ref 94–97)
O2HB: 95.1 % (ref 94–97)
O2HB: 95.6 % (ref 94–97)
O2HB: 95.7 % (ref 94–97)
O2HB: 95.8 % (ref 94–97)
O2HB: 95.8 % (ref 94–97)
O2HB: 95.9 % (ref 94–97)
O2HB: 96.1 % (ref 94–97)
O2HB: 96.5 % (ref 94–97)
O2HB: 96.5 % (ref 94–97)
O2HB: 96.8 % (ref 94–97)
O2HB: 96.8 % (ref 94–97)
O2HB: 97 % (ref 94–97)
O2HB: 97 % (ref 94–97)
O2HB: 97.1 % (ref 94–97)
O2HB: 97.2 % (ref 94–97)
O2HB: 97.3 % (ref 94–97)
O2HB: 97.3 % (ref 94–97)
O2HB: 97.7 % (ref 94–97)
O2HB: 97.7 % (ref 94–97)
O2HB: 97.8 % (ref 94–97)
OPERATOR ID: 1222
OPERATOR ID: 187
OPERATOR ID: 1893
OPERATOR ID: 2067
OPERATOR ID: 2593
OPERATOR ID: 2962
OPERATOR ID: 3342
OPERATOR ID: 366
OPERATOR ID: 7221
OPERATOR ID: 7221
OPERATOR ID: 7490
OPERATOR ID: 7490
OPERATOR ID: 8214
OPERATOR ID: 8217
OPERATOR ID: 882
OPERATOR ID: 882
OPERATOR ID: ABNORMAL
ORGANISM: ABNORMAL
ORGANISM: ABNORMAL
OVALOCYTES: ABNORMAL
PATHOLOGIST REVIEW: NORMAL
PATHOLOGIST REVIEW: NORMAL
PATIENT TEMP: 37 C
PCO2 37: 51.4 MMHG (ref 35–45)
PCO2: 23 MMHG (ref 35–45)
PCO2: 25 MMHG (ref 35–45)
PCO2: 28.9 MMHG (ref 35–45)
PCO2: 33.4 MMHG (ref 35–45)
PCO2: 33.5 MMHG (ref 35–45)
PCO2: 34.3 MMHG (ref 35–45)
PCO2: 38.3 MMHG (ref 35–45)
PCO2: 38.5 MMHG (ref 35–45)
PCO2: 39.1 MMHG (ref 35–45)
PCO2: 41.2 MMHG (ref 35–45)
PCO2: 41.5 MMHG (ref 35–45)
PCO2: 41.9 MMHG (ref 35–45)
PCO2: 43.6 MMHG (ref 35–45)
PCO2: 43.9 MMHG (ref 35–45)
PCO2: 44.8 MMHG (ref 35–45)
PCO2: 48.4 MMHG (ref 35–45)
PCO2: 49.4 MMHG (ref 35–45)
PCO2: 52 MMHG (ref 35–45)
PCO2: 52.8 MMHG (ref 35–45)
PCO2: 53.5 MMHG (ref 35–45)
PCO2: 56.5 MMHG (ref 35–45)
PCO2: 56.6 MMHG (ref 35–45)
PCO2: 60.8 MMHG (ref 35–45)
PCO2: 61.3 MMHG (ref 35–45)
PCO2: 65 MMHG (ref 35–45)
PCO2: 66.7 MMHG (ref 35–45)
PCO2: 67.1 MMHG (ref 35–45)
PCO2: 69.4 MMHG (ref 35–45)
PCO2: 70.4 MMHG (ref 35–45)
PCO2: 73.5 MMHG (ref 35–45)
PCO2: 74.7 MMHG (ref 35–45)
PCO2: 78.3 MMHG (ref 35–45)
PEEP/CPAP: 10 CMH2O
PEEP/CPAP: 6 CMH2O
PEEP/CPAP: 8 CMH2O
PEEP/CPAP: 8 CMH2O
PFO2: 0.51 MMHG/%
PFO2: 0.65 MMHG/%
PFO2: 1.49 MMHG/%
PFO2: 1.6 MMHG/%
PFO2: 2.1 MMHG/%
PFO2: 2.14 MMHG/%
PFO2: 2.35 MMHG/%
PFO2: 2.42 MMHG/%
PFO2: 2.46 MMHG/%
PFO2: 2.55 MMHG/%
PFO2: 2.77 MMHG/%
PFO2: 2.83 MMHG/%
PFO2: 2.84 MMHG/%
PFO2: 2.89 MMHG/%
PFO2: 3.11 MMHG/%
PFO2: 3.12 MMHG/%
PFO2: 3.25 MMHG/%
PFO2: 3.26 MMHG/%
PFO2: 3.39 MMHG/%
PFO2: 3.6 MMHG/%
PFO2: 3.67 MMHG/%
PFO2: 3.68 MMHG/%
PFO2: 3.69 MMHG/%
PFO2: 3.93 MMHG/%
PH 37: 7.44 (ref 7.35–7.45)
PH BLOOD GAS: 7.28 (ref 7.35–7.45)
PH BLOOD GAS: 7.3 (ref 7.35–7.45)
PH BLOOD GAS: 7.31 (ref 7.35–7.45)
PH BLOOD GAS: 7.31 (ref 7.35–7.45)
PH BLOOD GAS: 7.33 (ref 7.35–7.45)
PH BLOOD GAS: 7.34 (ref 7.35–7.45)
PH BLOOD GAS: 7.36 (ref 7.35–7.45)
PH BLOOD GAS: 7.37 (ref 7.35–7.45)
PH BLOOD GAS: 7.39 (ref 7.35–7.45)
PH BLOOD GAS: 7.39 (ref 7.35–7.45)
PH BLOOD GAS: 7.4 (ref 7.35–7.45)
PH BLOOD GAS: 7.41 (ref 7.35–7.45)
PH BLOOD GAS: 7.41 (ref 7.35–7.45)
PH BLOOD GAS: 7.42 (ref 7.35–7.45)
PH BLOOD GAS: 7.43 (ref 7.35–7.45)
PH BLOOD GAS: 7.45 (ref 7.35–7.45)
PH BLOOD GAS: 7.46 (ref 7.35–7.45)
PH BLOOD GAS: 7.47 (ref 7.35–7.45)
PH BLOOD GAS: 7.48 (ref 7.35–7.45)
PH BLOOD GAS: 7.51 (ref 7.35–7.45)
PH BLOOD GAS: 7.51 (ref 7.35–7.45)
PH BLOOD GAS: 7.52 (ref 7.35–7.45)
PH BLOOD GAS: 7.53 (ref 7.35–7.45)
PH BLOOD GAS: 7.56 (ref 7.35–7.45)
PH BLOOD GAS: 7.59 (ref 7.35–7.45)
PHOSPHATE SERPL-MCNC: 1.5 MG/DL (ref 2.5–4.5)
PHOSPHATE SERPL-MCNC: 1.7 MG/DL (ref 2.5–4.5)
PHOSPHATE SERPL-MCNC: 2 MG/DL (ref 2.5–4.5)
PHOSPHATE SERPL-MCNC: 2.1 MG/DL (ref 2.5–4.5)
PHOSPHATE SERPL-MCNC: 2.1 MG/DL (ref 2.5–4.5)
PHOSPHATE SERPL-MCNC: 2.2 MG/DL (ref 2.5–4.5)
PHOSPHATE SERPL-MCNC: 2.2 MG/DL (ref 2.5–4.5)
PHOSPHATE SERPL-MCNC: 2.3 MG/DL (ref 2.5–4.5)
PHOSPHATE SERPL-MCNC: 2.3 MG/DL (ref 2.5–4.5)
PHOSPHATE SERPL-MCNC: 2.4 MG/DL (ref 2.5–4.5)
PHOSPHATE SERPL-MCNC: 2.5 MG/DL (ref 2.5–4.5)
PHOSPHATE SERPL-MCNC: 2.6 MG/DL (ref 2.5–4.5)
PHOSPHATE SERPL-MCNC: 2.6 MG/DL (ref 2.5–4.5)
PHOSPHATE SERPL-MCNC: 2.7 MG/DL (ref 2.5–4.5)
PHOSPHATE SERPL-MCNC: 3.1 MG/DL (ref 2.5–4.5)
PHOSPHATE SERPL-MCNC: 3.2 MG/DL (ref 2.5–4.5)
PHOSPHATE SERPL-MCNC: 3.3 MG/DL (ref 2.5–4.5)
PHOSPHATE SERPL-MCNC: 3.4 MG/DL (ref 2.5–4.5)
PHOSPHATE SERPL-MCNC: 3.5 MG/DL (ref 2.5–4.5)
PHOSPHATE SERPL-MCNC: 3.7 MG/DL (ref 2.5–4.5)
PHOSPHATE SERPL-MCNC: 4 MG/DL (ref 2.5–4.5)
PHOSPHATE SERPL-MCNC: 4.1 MG/DL (ref 2.5–4.5)
PHOSPHATE SERPL-MCNC: 4.4 MG/DL (ref 2.5–4.5)
PIP: 14 CMH2O
PIP: 14 CMH2O
PIP: 15 CMH2O
PLATELET # BLD AUTO: 114 E9/L (ref 130–450)
PLATELET # BLD AUTO: 169 E9/L (ref 130–450)
PLATELET # BLD AUTO: 181 E9/L (ref 130–450)
PLATELET # BLD AUTO: 189 E9/L (ref 130–450)
PLATELET # BLD AUTO: 189 E9/L (ref 130–450)
PLATELET # BLD AUTO: 198 E9/L (ref 130–450)
PLATELET # BLD AUTO: 207 E9/L (ref 130–450)
PLATELET # BLD AUTO: 221 E9/L (ref 130–450)
PLATELET # BLD AUTO: 224 E9/L (ref 130–450)
PLATELET # BLD AUTO: 225 E9/L (ref 130–450)
PLATELET # BLD AUTO: 228 E9/L (ref 130–450)
PLATELET # BLD AUTO: 235 E9/L (ref 130–450)
PLATELET # BLD AUTO: 237 E9/L (ref 130–450)
PLATELET # BLD AUTO: 246 E9/L (ref 130–450)
PLATELET # BLD AUTO: 259 E9/L (ref 130–450)
PLATELET # BLD AUTO: 260 E9/L (ref 130–450)
PLATELET # BLD AUTO: 270 E9/L (ref 130–450)
PLATELET # BLD AUTO: 281 E9/L (ref 130–450)
PLATELET # BLD AUTO: 291 E9/L (ref 130–450)
PLATELET # BLD AUTO: 291 E9/L (ref 130–450)
PLATELET # BLD AUTO: 292 E9/L (ref 130–450)
PLATELET # BLD AUTO: 293 E9/L (ref 130–450)
PLATELET # BLD AUTO: 294 E9/L (ref 130–450)
PLATELET # BLD AUTO: 295 E9/L (ref 130–450)
PLATELET # BLD AUTO: 298 E9/L (ref 130–450)
PLATELET # BLD AUTO: 306 E9/L (ref 130–450)
PLATELET # BLD AUTO: 310 E9/L (ref 130–450)
PLATELET # BLD AUTO: 323 E9/L (ref 130–450)
PLATELET # BLD AUTO: 330 E9/L (ref 130–450)
PLATELET # BLD AUTO: 352 E9/L (ref 130–450)
PLATELET # BLD AUTO: 355 E9/L (ref 130–450)
PLATELET # BLD AUTO: 355 E9/L (ref 130–450)
PLATELET # BLD AUTO: 365 E9/L (ref 130–450)
PLATELET # BLD AUTO: 367 E9/L (ref 130–450)
PLATELET # BLD AUTO: 397 E9/L (ref 130–450)
PLATELET # BLD AUTO: 418 E9/L (ref 130–450)
PLATELET # BLD AUTO: 464 E9/L (ref 130–450)
PMV BLD AUTO: 10.3 FL (ref 7–12)
PMV BLD AUTO: 8.8 FL (ref 7–12)
PMV BLD AUTO: 8.8 FL (ref 7–12)
PMV BLD AUTO: 8.9 FL (ref 7–12)
PMV BLD AUTO: 8.9 FL (ref 7–12)
PMV BLD AUTO: 9 FL (ref 7–12)
PMV BLD AUTO: 9.1 FL (ref 7–12)
PMV BLD AUTO: 9.2 FL (ref 7–12)
PMV BLD AUTO: 9.3 FL (ref 7–12)
PMV BLD AUTO: 9.4 FL (ref 7–12)
PMV BLD AUTO: 9.5 FL (ref 7–12)
PMV BLD AUTO: 9.6 FL (ref 7–12)
PMV BLD AUTO: 9.7 FL (ref 7–12)
PMV BLD AUTO: 9.7 FL (ref 7–12)
PMV BLD AUTO: 9.8 FL (ref 7–12)
PMV BLD AUTO: 9.8 FL (ref 7–12)
PO2 37: 172.9 MMHG (ref 60–80)
PO2: 102 MMHG (ref 75–100)
PO2: 102.7 MMHG (ref 75–100)
PO2: 105.6 MMHG (ref 75–100)
PO2: 109.1 MMHG (ref 75–100)
PO2: 110.9 MMHG (ref 75–100)
PO2: 113.5 MMHG (ref 75–100)
PO2: 115.7 MMHG (ref 75–100)
PO2: 124.4 MMHG (ref 75–100)
PO2: 130.2 MMHG (ref 75–100)
PO2: 130.4 MMHG (ref 75–100)
PO2: 144.1 MMHG (ref 75–100)
PO2: 147 MMHG (ref 75–100)
PO2: 147.3 MMHG (ref 75–100)
PO2: 147.6 MMHG (ref 75–100)
PO2: 152.5 MMHG (ref 75–100)
PO2: 152.5 MMHG (ref 75–100)
PO2: 157.2 MMHG (ref 75–100)
PO2: 183.7 MMHG (ref 75–100)
PO2: 202.7 MMHG (ref 75–100)
PO2: 33.4 MMHG (ref 75–100)
PO2: 44.8 MMHG (ref 75–100)
PO2: 59.4 MMHG (ref 75–100)
PO2: 63.3 MMHG (ref 75–100)
PO2: 65 MMHG (ref 75–100)
PO2: 69.9 MMHG (ref 75–100)
PO2: 73.1 MMHG (ref 75–100)
PO2: 79.8 MMHG (ref 75–100)
PO2: 85.8 MMHG (ref 75–100)
PO2: 92.7 MMHG (ref 75–100)
PO2: 94.6 MMHG (ref 75–100)
PO2: 94.8 MMHG (ref 75–100)
PO2: 96.8 MMHG (ref 75–100)
POC SOURCE: ABNORMAL
POIKILOCYTES: ABNORMAL
POLYCHROMASIA: ABNORMAL
POTASSIUM SERPL-SCNC: 3.4 MMOL/L (ref 3.5–5)
POTASSIUM SERPL-SCNC: 3.4 MMOL/L (ref 3.5–5)
POTASSIUM SERPL-SCNC: 3.5 MMOL/L (ref 3.5–5)
POTASSIUM SERPL-SCNC: 3.6 MMOL/L (ref 3.5–5)
POTASSIUM SERPL-SCNC: 3.7 MMOL/L (ref 3.5–5)
POTASSIUM SERPL-SCNC: 3.8 MMOL/L (ref 3.5–5)
POTASSIUM SERPL-SCNC: 3.9 MMOL/L (ref 3.5–5)
POTASSIUM SERPL-SCNC: 4 MMOL/L (ref 3.5–5)
POTASSIUM SERPL-SCNC: 4.1 MMOL/L (ref 3.5–5)
POTASSIUM SERPL-SCNC: 4.2 MMOL/L (ref 3.5–5)
POTASSIUM SERPL-SCNC: 4.3 MMOL/L (ref 3.5–5)
POTASSIUM SERPL-SCNC: 4.4 MMOL/L (ref 3.5–5)
POTASSIUM SERPL-SCNC: 4.4 MMOL/L (ref 3.5–5)
POTASSIUM SERPL-SCNC: 4.5 MMOL/L (ref 3.5–5)
POTASSIUM SERPL-SCNC: 4.9 MMOL/L (ref 3.5–5)
POTASSIUM UR-SCNC: 59.6 MMOL/L
PROCALCITONIN: 0.66 NG/ML (ref 0–0.08)
PROMYELOCYTES NFR BLD MANUAL: 0.9 % (ref 0–0)
PROT FLD-MCNC: 3.2 G/DL
PROT FLD-MCNC: 3.3 G/DL
PROT SERPL-MCNC: 5.4 G/DL (ref 6.4–8.3)
PROT SERPL-MCNC: 5.5 G/DL (ref 6.4–8.3)
PROT SERPL-MCNC: 5.6 G/DL (ref 6.4–8.3)
PROT SERPL-MCNC: 5.7 G/DL (ref 6.4–8.3)
PROT SERPL-MCNC: 5.7 G/DL (ref 6.4–8.3)
PROT SERPL-MCNC: 5.8 G/DL (ref 6.4–8.3)
PROT SERPL-MCNC: 5.8 G/DL (ref 6.4–8.3)
PROT SERPL-MCNC: 5.9 G/DL (ref 6.4–8.3)
PROT SERPL-MCNC: 5.9 G/DL (ref 6.4–8.3)
PROT SERPL-MCNC: 6 G/DL (ref 6.4–8.3)
PROT SERPL-MCNC: 6.5 G/DL (ref 6.4–8.3)
PROT SERPL-MCNC: 6.6 G/DL (ref 6.4–8.3)
PROT SERPL-MCNC: 7.1 G/DL (ref 6.4–8.3)
PROTHROMBIN TIME: 14.7 SEC (ref 9.3–12.4)
PROTHROMBIN TIME: 17.1 SEC (ref 9.3–12.4)
PS: 15 CMH20
PS: 15 CMH20
PTH-INTACT SERPL-MCNC: 87 PG/ML (ref 15–65)
R (REACTION TIME): 6.2 MIN (ref 5–10)
R (REACTION TIME): 6.5 MIN (ref 5–10)
R (REACTION TIME): 7.2 MIN (ref 5–10)
R (REACTION TIME): 8 MIN (ref 5–10)
RBC # BLD AUTO: 1.98 E12/L (ref 3.8–5.8)
RBC # BLD AUTO: 2.07 E12/L (ref 3.8–5.8)
RBC # BLD AUTO: 2.19 E12/L (ref 3.8–5.8)
RBC # BLD AUTO: 2.21 E12/L (ref 3.8–5.8)
RBC # BLD AUTO: 2.25 E12/L (ref 3.8–5.8)
RBC # BLD AUTO: 2.28 E12/L (ref 3.8–5.8)
RBC # BLD AUTO: 2.32 E12/L (ref 3.8–5.8)
RBC # BLD AUTO: 2.34 E12/L (ref 3.8–5.8)
RBC # BLD AUTO: 2.41 E12/L (ref 3.8–5.8)
RBC # BLD AUTO: 2.42 E12/L (ref 3.8–5.8)
RBC # BLD AUTO: 2.43 E12/L (ref 3.8–5.8)
RBC # BLD AUTO: 2.44 E12/L (ref 3.8–5.8)
RBC # BLD AUTO: 2.45 E12/L (ref 3.8–5.8)
RBC # BLD AUTO: 2.48 E12/L (ref 3.8–5.8)
RBC # BLD AUTO: 2.5 E12/L (ref 3.8–5.8)
RBC # BLD AUTO: 2.52 E12/L (ref 3.8–5.8)
RBC # BLD AUTO: 2.55 E12/L (ref 3.8–5.8)
RBC # BLD AUTO: 2.56 E12/L (ref 3.8–5.8)
RBC # BLD AUTO: 2.6 E12/L (ref 3.8–5.8)
RBC # BLD AUTO: 2.68 E12/L (ref 3.8–5.8)
RBC # BLD AUTO: 2.69 E12/L (ref 3.8–5.8)
RBC # BLD AUTO: 2.69 E12/L (ref 3.8–5.8)
RBC # BLD AUTO: 2.71 E12/L (ref 3.8–5.8)
RBC # BLD AUTO: 2.78 E12/L (ref 3.8–5.8)
RBC # BLD AUTO: 2.81 E12/L (ref 3.8–5.8)
RBC # BLD AUTO: 2.85 E12/L (ref 3.8–5.8)
RBC # BLD AUTO: 2.88 E12/L (ref 3.8–5.8)
RBC # BLD AUTO: 3.02 E12/L (ref 3.8–5.8)
RBC # BLD AUTO: 3.1 E12/L (ref 3.8–5.8)
RBC # BLD AUTO: 3.13 E12/L (ref 3.8–5.8)
RBC # BLD AUTO: 3.17 E12/L (ref 3.8–5.8)
RBC # BLD AUTO: 3.42 E12/L (ref 3.8–5.8)
RBC # BLD AUTO: 3.46 E12/L (ref 3.8–5.8)
RBC # BLD AUTO: 3.46 E12/L (ref 3.8–5.8)
RBC # BLD AUTO: 3.81 E12/L (ref 3.8–5.8)
RBC FLUID: 7000 /UL
RBC FLUID: NORMAL /UL
RETIC HGB EQUIVALENT: 35.9 PG (ref 28.2–36.6)
RETICS/RBC NFR: 1.3 % (ref 0.4–1.9)
RETICULOCYTE ABSOLUTE COUNT: 0.03 E12/L
RI(T): 0.49
RI(T): 0.53
RI(T): 0.54
RI(T): 0.6
RI(T): 0.6
RI(T): 0.72
RI(T): 0.81
RI(T): 0.86
RI(T): 0.89
RI(T): 1
RI(T): 1
RI(T): 1.06
RI(T): 1.09
RI(T): 1.1
RI(T): 1.11
RI(T): 1.18
RI(T): 1.39
RI(T): 1.39
RI(T): 1.4
RI(T): 1.62
RI(T): 10.65
RI(T): 2.68
RI(T): 2.83
RI(T): 8.65
RR MECHANICAL: 14 B/MIN
RR MECHANICAL: 16 B/MIN
RR MECHANICAL: 18 B/MIN
RR MECHANICAL: 18 B/MIN
RSV RNA NPH QL NAA+NON-PROBE: NOT DETECTED
RV+EV RNA NPH QL NAA+NON-PROBE: NOT DETECTED
S PNEUM AG SPEC QL: NORMAL
SARS-COV-2 RDRP RESP QL NAA+PROBE: NOT DETECTED
SARS-COV-2 RNA NPH QL NAA+NON-PROBE: NOT DETECTED
SCHISTOCYTES: ABNORMAL
SMEAR, RESPIRATORY: ABNORMAL
SMEAR, RESPIRATORY: ABNORMAL
SODIUM SERPL-SCNC: 127 MMOL/L (ref 132–146)
SODIUM SERPL-SCNC: 128 MMOL/L (ref 132–146)
SODIUM SERPL-SCNC: 132 MMOL/L (ref 132–146)
SODIUM SERPL-SCNC: 133 MMOL/L (ref 132–146)
SODIUM SERPL-SCNC: 135 MMOL/L (ref 132–146)
SODIUM SERPL-SCNC: 135 MMOL/L (ref 132–146)
SODIUM SERPL-SCNC: 136 MMOL/L (ref 132–146)
SODIUM SERPL-SCNC: 137 MMOL/L (ref 132–146)
SODIUM SERPL-SCNC: 139 MMOL/L (ref 132–146)
SODIUM SERPL-SCNC: 139 MMOL/L (ref 132–146)
SODIUM SERPL-SCNC: 140 MMOL/L (ref 132–146)
SODIUM SERPL-SCNC: 140 MMOL/L (ref 132–146)
SODIUM SERPL-SCNC: 141 MMOL/L (ref 132–146)
SODIUM SERPL-SCNC: 142 MMOL/L (ref 132–146)
SODIUM SERPL-SCNC: 143 MMOL/L (ref 132–146)
SODIUM SERPL-SCNC: 143 MMOL/L (ref 132–146)
SODIUM SERPL-SCNC: 144 MMOL/L (ref 132–146)
SODIUM SERPL-SCNC: 145 MMOL/L (ref 132–146)
SODIUM SERPL-SCNC: 145 MMOL/L (ref 132–146)
SODIUM SERPL-SCNC: 146 MMOL/L (ref 132–146)
SODIUM SERPL-SCNC: 147 MMOL/L (ref 132–146)
SODIUM SERPL-SCNC: 148 MMOL/L (ref 132–146)
SODIUM SERPL-SCNC: 149 MMOL/L (ref 132–146)
SODIUM SERPL-SCNC: 149 MMOL/L (ref 132–146)
SODIUM SERPL-SCNC: 150 MMOL/L (ref 132–146)
SODIUM SERPL-SCNC: 152 MMOL/L (ref 132–146)
SODIUM UR-SCNC: <20 MMOL/L
SOURCE, BLOOD GAS: ABNORMAL
STOMATOCYTES: ABNORMAL
TARGET CELLS: ABNORMAL
TEAR DROP CELLS: ABNORMAL
THB: 10 G/DL (ref 11.5–16.5)
THB: 10.4 G/DL (ref 11.5–16.5)
THB: 10.6 G/DL (ref 11.5–16.5)
THB: 10.7 G/DL (ref 11.5–16.5)
THB: 10.7 G/DL (ref 11.5–16.5)
THB: 10.8 G/DL (ref 11.5–16.5)
THB: 10.8 G/DL (ref 11.5–16.5)
THB: 10.9 G/DL (ref 11.5–16.5)
THB: 10.9 G/DL (ref 11.5–16.5)
THB: 11 G/DL (ref 11.5–16.5)
THB: 11.5 G/DL (ref 11.5–16.5)
THB: 11.5 G/DL (ref 11.5–16.5)
THB: 11.6 G/DL (ref 11.5–16.5)
THB: 7.6 G/DL (ref 11.5–16.5)
THB: 8.1 G/DL (ref 11.5–16.5)
THB: 8.2 G/DL (ref 11.5–16.5)
THB: 8.3 G/DL (ref 11.5–16.5)
THB: 8.4 G/DL (ref 11.5–16.5)
THB: 8.8 G/DL (ref 11.5–16.5)
THB: 8.9 G/DL (ref 11.5–16.5)
THB: 9 G/DL (ref 11.5–16.5)
THB: 9 G/DL (ref 11.5–16.5)
THB: 9.1 G/DL (ref 11.5–16.5)
THB: 9.1 G/DL (ref 11.5–16.5)
THB: 9.3 G/DL (ref 11.5–16.5)
THB: 9.4 G/DL (ref 11.5–16.5)
THB: 9.4 G/DL (ref 11.5–16.5)
THB: 9.5 G/DL (ref 11.5–16.5)
THB: 9.9 G/DL (ref 11.5–16.5)
THB: 9.9 G/DL (ref 11.5–16.5)
TIBC SERPL-MCNC: 245 MCG/DL (ref 250–450)
TIME ANALYZED: 1016
TIME ANALYZED: 1058
TIME ANALYZED: 1121
TIME ANALYZED: 1246
TIME ANALYZED: 1453
TIME ANALYZED: 1548
TIME ANALYZED: 1807
TIME ANALYZED: 2143
TIME ANALYZED: 2149
TIME ANALYZED: 413
TIME ANALYZED: 420
TIME ANALYZED: 427
TIME ANALYZED: 432
TIME ANALYZED: 436
TIME ANALYZED: 438
TIME ANALYZED: 443
TIME ANALYZED: 444
TIME ANALYZED: 446
TIME ANALYZED: 454
TIME ANALYZED: 454
TIME ANALYZED: 456
TIME ANALYZED: 520
TIME ANALYZED: 544
TIME ANALYZED: 554
TIME ANALYZED: 559
TIME ANALYZED: 615
TIME ANALYZED: 616
TIME ANALYZED: 632
TIME ANALYZED: 828
TIME ANALYZED: 840
TIME ANALYZED: 932
TIME ANALYZED: 955
TOXIC GRANULATION: ABNORMAL
TOXIC GRANULATION: ABNORMAL
TROPONIN, HIGH SENSITIVITY: 64 NG/L (ref 0–11)
TROPONIN, HIGH SENSITIVITY: 67 NG/L (ref 0–11)
TSH SERPL-MCNC: 2.35 UIU/ML (ref 0.27–4.2)
UREA NITROGEN, UR: 471 MG/DL (ref 800–1666)
VACUOLATED NEUTROPHILS: ABNORMAL
VACUOLATED NEUTROPHILS: ABNORMAL
VANCOMYCIN SERPL-MCNC: 16.8 MCG/ML (ref 5–40)
VANCOMYCIN SERPL-MCNC: 17.2 MCG/ML (ref 5–40)
VANCOMYCIN SERPL-MCNC: 21.7 MCG/ML (ref 5–40)
VIT B12 SERPL-MCNC: 549 PG/ML (ref 211–946)
VITAMIN D 25-HYDROXY: 27 NG/ML (ref 30–100)
VT MECHANICAL: 400 ML
VT MECHANICAL: 430 ML
VT MECHANICAL: 450 ML
WBC # BLD: 10 E9/L (ref 4.5–11.5)
WBC # BLD: 10 E9/L (ref 4.5–11.5)
WBC # BLD: 10.4 E9/L (ref 4.5–11.5)
WBC # BLD: 10.7 E9/L (ref 4.5–11.5)
WBC # BLD: 10.9 E9/L (ref 4.5–11.5)
WBC # BLD: 11 E9/L (ref 4.5–11.5)
WBC # BLD: 11.2 E9/L (ref 4.5–11.5)
WBC # BLD: 11.3 E9/L (ref 4.5–11.5)
WBC # BLD: 11.6 E9/L (ref 4.5–11.5)
WBC # BLD: 11.7 E9/L (ref 4.5–11.5)
WBC # BLD: 11.9 E9/L (ref 4.5–11.5)
WBC # BLD: 12.1 E9/L (ref 4.5–11.5)
WBC # BLD: 12.7 E9/L (ref 4.5–11.5)
WBC # BLD: 13 E9/L (ref 4.5–11.5)
WBC # BLD: 13 E9/L (ref 4.5–11.5)
WBC # BLD: 13.2 E9/L (ref 4.5–11.5)
WBC # BLD: 14 E9/L (ref 4.5–11.5)
WBC # BLD: 14.1 E9/L (ref 4.5–11.5)
WBC # BLD: 14.2 E9/L (ref 4.5–11.5)
WBC # BLD: 14.6 E9/L (ref 4.5–11.5)
WBC # BLD: 15.2 E9/L (ref 4.5–11.5)
WBC # BLD: 15.5 E9/L (ref 4.5–11.5)
WBC # BLD: 16 E9/L (ref 4.5–11.5)
WBC # BLD: 20.9 E9/L (ref 4.5–11.5)
WBC # BLD: 5.5 E9/L (ref 4.5–11.5)
WBC # BLD: 5.6 E9/L (ref 4.5–11.5)
WBC # BLD: 5.9 E9/L (ref 4.5–11.5)
WBC # BLD: 6.1 E9/L (ref 4.5–11.5)
WBC # BLD: 6.1 E9/L (ref 4.5–11.5)
WBC # BLD: 6.9 E9/L (ref 4.5–11.5)
WBC # BLD: 7.5 E9/L (ref 4.5–11.5)
WBC # BLD: 7.6 E9/L (ref 4.5–11.5)
WBC # BLD: 8.8 E9/L (ref 4.5–11.5)
WBC # BLD: 9.4 E9/L (ref 4.5–11.5)
WBC # BLD: 9.6 E9/L (ref 4.5–11.5)
WBC # BLD: 9.7 E9/L (ref 4.5–11.5)
WBC # BLD: 9.9 E9/L (ref 4.5–11.5)

## 2023-01-01 PROCEDURE — 86900 BLOOD TYPING SEROLOGIC ABO: CPT

## 2023-01-01 PROCEDURE — 76700 US EXAM ABDOM COMPLETE: CPT

## 2023-01-01 PROCEDURE — 6360000002 HC RX W HCPCS: Performed by: STUDENT IN AN ORGANIZED HEALTH CARE EDUCATION/TRAINING PROGRAM

## 2023-01-01 PROCEDURE — 2500000003 HC RX 250 WO HCPCS: Performed by: CHIROPRACTOR

## 2023-01-01 PROCEDURE — 84100 ASSAY OF PHOSPHORUS: CPT

## 2023-01-01 PROCEDURE — 2580000003 HC RX 258: Performed by: HOSPITALIST

## 2023-01-01 PROCEDURE — 2500000003 HC RX 250 WO HCPCS: Performed by: STUDENT IN AN ORGANIZED HEALTH CARE EDUCATION/TRAINING PROGRAM

## 2023-01-01 PROCEDURE — 2580000003 HC RX 258: Performed by: STUDENT IN AN ORGANIZED HEALTH CARE EDUCATION/TRAINING PROGRAM

## 2023-01-01 PROCEDURE — 83605 ASSAY OF LACTIC ACID: CPT

## 2023-01-01 PROCEDURE — 82150 ASSAY OF AMYLASE: CPT

## 2023-01-01 PROCEDURE — 02HV33Z INSERTION OF INFUSION DEVICE INTO SUPERIOR VENA CAVA, PERCUTANEOUS APPROACH: ICD-10-PCS | Performed by: INTERNAL MEDICINE

## 2023-01-01 PROCEDURE — 82962 GLUCOSE BLOOD TEST: CPT

## 2023-01-01 PROCEDURE — 2140000000 HC CCU INTERMEDIATE R&B

## 2023-01-01 PROCEDURE — 6370000000 HC RX 637 (ALT 250 FOR IP): Performed by: INTERNAL MEDICINE

## 2023-01-01 PROCEDURE — 82607 VITAMIN B-12: CPT

## 2023-01-01 PROCEDURE — 85025 COMPLETE CBC W/AUTO DIFF WBC: CPT

## 2023-01-01 PROCEDURE — 99291 CRITICAL CARE FIRST HOUR: CPT | Performed by: INTERNAL MEDICINE

## 2023-01-01 PROCEDURE — C1751 CATH, INF, PER/CENT/MIDLINE: HCPCS

## 2023-01-01 PROCEDURE — 71045 X-RAY EXAM CHEST 1 VIEW: CPT

## 2023-01-01 PROCEDURE — 36415 COLL VENOUS BLD VENIPUNCTURE: CPT

## 2023-01-01 PROCEDURE — 82140 ASSAY OF AMMONIA: CPT

## 2023-01-01 PROCEDURE — 94640 AIRWAY INHALATION TREATMENT: CPT

## 2023-01-01 PROCEDURE — 94660 CPAP INITIATION&MGMT: CPT

## 2023-01-01 PROCEDURE — 87340 HEPATITIS B SURFACE AG IA: CPT

## 2023-01-01 PROCEDURE — 6370000000 HC RX 637 (ALT 250 FOR IP): Performed by: STUDENT IN AN ORGANIZED HEALTH CARE EDUCATION/TRAINING PROGRAM

## 2023-01-01 PROCEDURE — 6360000002 HC RX W HCPCS: Performed by: HOSPITALIST

## 2023-01-01 PROCEDURE — 80053 COMPREHEN METABOLIC PANEL: CPT

## 2023-01-01 PROCEDURE — 82805 BLOOD GASES W/O2 SATURATION: CPT

## 2023-01-01 PROCEDURE — 2500000003 HC RX 250 WO HCPCS: Performed by: INTERNAL MEDICINE

## 2023-01-01 PROCEDURE — 51798 US URINE CAPACITY MEASURE: CPT

## 2023-01-01 PROCEDURE — 82330 ASSAY OF CALCIUM: CPT

## 2023-01-01 PROCEDURE — 83735 ASSAY OF MAGNESIUM: CPT

## 2023-01-01 PROCEDURE — 31500 INSERT EMERGENCY AIRWAY: CPT

## 2023-01-01 PROCEDURE — 6370000000 HC RX 637 (ALT 250 FOR IP): Performed by: CHIROPRACTOR

## 2023-01-01 PROCEDURE — 86901 BLOOD TYPING SEROLOGIC RH(D): CPT

## 2023-01-01 PROCEDURE — 93306 TTE W/DOPPLER COMPLETE: CPT

## 2023-01-01 PROCEDURE — 94003 VENT MGMT INPAT SUBQ DAY: CPT

## 2023-01-01 PROCEDURE — 2700000000 HC OXYGEN THERAPY PER DAY

## 2023-01-01 PROCEDURE — 86706 HEP B SURFACE ANTIBODY: CPT

## 2023-01-01 PROCEDURE — 6360000002 HC RX W HCPCS: Performed by: INTERNAL MEDICINE

## 2023-01-01 PROCEDURE — 80048 BASIC METABOLIC PNL TOTAL CA: CPT

## 2023-01-01 PROCEDURE — 36620 INSERTION CATHETER ARTERY: CPT | Performed by: INTERNAL MEDICINE

## 2023-01-01 PROCEDURE — 36620 INSERTION CATHETER ARTERY: CPT

## 2023-01-01 PROCEDURE — 6360000002 HC RX W HCPCS: Performed by: CHIROPRACTOR

## 2023-01-01 PROCEDURE — 84484 ASSAY OF TROPONIN QUANT: CPT

## 2023-01-01 PROCEDURE — 6370000000 HC RX 637 (ALT 250 FOR IP): Performed by: SURGERY

## 2023-01-01 PROCEDURE — 85027 COMPLETE CBC AUTOMATED: CPT

## 2023-01-01 PROCEDURE — 2580000003 HC RX 258: Performed by: INTERNAL MEDICINE

## 2023-01-01 PROCEDURE — 85014 HEMATOCRIT: CPT

## 2023-01-01 PROCEDURE — 5A1945Z RESPIRATORY VENTILATION, 24-96 CONSECUTIVE HOURS: ICD-10-PCS | Performed by: INTERNAL MEDICINE

## 2023-01-01 PROCEDURE — 94002 VENT MGMT INPAT INIT DAY: CPT

## 2023-01-01 PROCEDURE — 6370000000 HC RX 637 (ALT 250 FOR IP): Performed by: HOSPITALIST

## 2023-01-01 PROCEDURE — 89051 BODY FLUID CELL COUNT: CPT

## 2023-01-01 PROCEDURE — C9113 INJ PANTOPRAZOLE SODIUM, VIA: HCPCS | Performed by: STUDENT IN AN ORGANIZED HEALTH CARE EDUCATION/TRAINING PROGRAM

## 2023-01-01 PROCEDURE — 84157 ASSAY OF PROTEIN OTHER: CPT

## 2023-01-01 PROCEDURE — 97535 SELF CARE MNGMENT TRAINING: CPT

## 2023-01-01 PROCEDURE — 74176 CT ABD & PELVIS W/O CONTRAST: CPT

## 2023-01-01 PROCEDURE — 87040 BLOOD CULTURE FOR BACTERIA: CPT

## 2023-01-01 PROCEDURE — 2000000000 HC ICU R&B

## 2023-01-01 PROCEDURE — 2580000003 HC RX 258

## 2023-01-01 PROCEDURE — 99233 SBSQ HOSP IP/OBS HIGH 50: CPT | Performed by: INTERNAL MEDICINE

## 2023-01-01 PROCEDURE — 90935 HEMODIALYSIS ONE EVALUATION: CPT

## 2023-01-01 PROCEDURE — 94664 DEMO&/EVAL PT USE INHALER: CPT

## 2023-01-01 PROCEDURE — 99232 SBSQ HOSP IP/OBS MODERATE 35: CPT | Performed by: CLINICAL NURSE SPECIALIST

## 2023-01-01 PROCEDURE — 90945 DIALYSIS ONE EVALUATION: CPT

## 2023-01-01 PROCEDURE — 97161 PT EVAL LOW COMPLEX 20 MIN: CPT

## 2023-01-01 PROCEDURE — 85018 HEMOGLOBIN: CPT

## 2023-01-01 PROCEDURE — 2500000003 HC RX 250 WO HCPCS

## 2023-01-01 PROCEDURE — 85610 PROTHROMBIN TIME: CPT

## 2023-01-01 PROCEDURE — 74018 RADEX ABDOMEN 1 VIEW: CPT

## 2023-01-01 PROCEDURE — 36600 WITHDRAWAL OF ARTERIAL BLOOD: CPT

## 2023-01-01 PROCEDURE — 85576 BLOOD PLATELET AGGREGATION: CPT

## 2023-01-01 PROCEDURE — 82746 ASSAY OF FOLIC ACID SERUM: CPT

## 2023-01-01 PROCEDURE — 6360000004 HC RX CONTRAST MEDICATION: Performed by: RADIOLOGY

## 2023-01-01 PROCEDURE — 87205 SMEAR GRAM STAIN: CPT

## 2023-01-01 PROCEDURE — 99255 IP/OBS CONSLTJ NEW/EST HI 80: CPT | Performed by: STUDENT IN AN ORGANIZED HEALTH CARE EDUCATION/TRAINING PROGRAM

## 2023-01-01 PROCEDURE — 97165 OT EVAL LOW COMPLEX 30 MIN: CPT

## 2023-01-01 PROCEDURE — 76937 US GUIDE VASCULAR ACCESS: CPT

## 2023-01-01 PROCEDURE — 87635 SARS-COV-2 COVID-19 AMP PRB: CPT

## 2023-01-01 PROCEDURE — 94644 CONT INHLJ TX 1ST HOUR: CPT

## 2023-01-01 PROCEDURE — 82436 ASSAY OF URINE CHLORIDE: CPT

## 2023-01-01 PROCEDURE — 36592 COLLECT BLOOD FROM PICC: CPT

## 2023-01-01 PROCEDURE — 87206 SMEAR FLUORESCENT/ACID STAI: CPT

## 2023-01-01 PROCEDURE — 99292 CRITICAL CARE ADDL 30 MIN: CPT | Performed by: INTERNAL MEDICINE

## 2023-01-01 PROCEDURE — 88112 CYTOPATH CELL ENHANCE TECH: CPT

## 2023-01-01 PROCEDURE — 36430 TRANSFUSION BLD/BLD COMPNT: CPT

## 2023-01-01 PROCEDURE — 0BH17EZ INSERTION OF ENDOTRACHEAL AIRWAY INTO TRACHEA, VIA NATURAL OR ARTIFICIAL OPENING: ICD-10-PCS | Performed by: INTERNAL MEDICINE

## 2023-01-01 PROCEDURE — 0202U NFCT DS 22 TRGT SARS-COV-2: CPT

## 2023-01-01 PROCEDURE — 85347 COAGULATION TIME ACTIVATED: CPT

## 2023-01-01 PROCEDURE — C9113 INJ PANTOPRAZOLE SODIUM, VIA: HCPCS | Performed by: CHIROPRACTOR

## 2023-01-01 PROCEDURE — 85384 FIBRINOGEN ACTIVITY: CPT

## 2023-01-01 PROCEDURE — 36556 INSERT NON-TUNNEL CV CATH: CPT

## 2023-01-01 PROCEDURE — 80202 ASSAY OF VANCOMYCIN: CPT

## 2023-01-01 PROCEDURE — 83550 IRON BINDING TEST: CPT

## 2023-01-01 PROCEDURE — 85730 THROMBOPLASTIN TIME PARTIAL: CPT

## 2023-01-01 PROCEDURE — S5553 INSULIN LONG ACTING 5 U: HCPCS | Performed by: HOSPITALIST

## 2023-01-01 PROCEDURE — 93010 ELECTROCARDIOGRAM REPORT: CPT | Performed by: INTERNAL MEDICINE

## 2023-01-01 PROCEDURE — 83970 ASSAY OF PARATHORMONE: CPT

## 2023-01-01 PROCEDURE — 2580000003 HC RX 258: Performed by: CHIROPRACTOR

## 2023-01-01 PROCEDURE — 87449 NOS EACH ORGANISM AG IA: CPT

## 2023-01-01 PROCEDURE — 93005 ELECTROCARDIOGRAM TRACING: CPT | Performed by: PHYSICIAN ASSISTANT

## 2023-01-01 PROCEDURE — 71046 X-RAY EXAM CHEST 2 VIEWS: CPT

## 2023-01-01 PROCEDURE — 99231 SBSQ HOSP IP/OBS SF/LOW 25: CPT

## 2023-01-01 PROCEDURE — 95822 EEG COMA OR SLEEP ONLY: CPT | Performed by: PSYCHIATRY & NEUROLOGY

## 2023-01-01 PROCEDURE — 5A1955Z RESPIRATORY VENTILATION, GREATER THAN 96 CONSECUTIVE HOURS: ICD-10-PCS | Performed by: INTERNAL MEDICINE

## 2023-01-01 PROCEDURE — 99233 SBSQ HOSP IP/OBS HIGH 50: CPT | Performed by: SURGERY

## 2023-01-01 PROCEDURE — 31500 INSERT EMERGENCY AIRWAY: CPT | Performed by: INTERNAL MEDICINE

## 2023-01-01 PROCEDURE — 82570 ASSAY OF URINE CREATININE: CPT

## 2023-01-01 PROCEDURE — 84540 ASSAY OF URINE/UREA-N: CPT

## 2023-01-01 PROCEDURE — 99255 IP/OBS CONSLTJ NEW/EST HI 80: CPT | Performed by: SURGERY

## 2023-01-01 PROCEDURE — 49083 ABD PARACENTESIS W/IMAGING: CPT | Performed by: INTERNAL MEDICINE

## 2023-01-01 PROCEDURE — 84443 ASSAY THYROID STIM HORMONE: CPT

## 2023-01-01 PROCEDURE — 99222 1ST HOSP IP/OBS MODERATE 55: CPT

## 2023-01-01 PROCEDURE — 83880 ASSAY OF NATRIURETIC PEPTIDE: CPT

## 2023-01-01 PROCEDURE — 99232 SBSQ HOSP IP/OBS MODERATE 35: CPT | Performed by: SURGERY

## 2023-01-01 PROCEDURE — 88305 TISSUE EXAM BY PATHOLOGIST: CPT

## 2023-01-01 PROCEDURE — 86850 RBC ANTIBODY SCREEN: CPT

## 2023-01-01 PROCEDURE — 83010 ASSAY OF HAPTOGLOBIN QUANT: CPT

## 2023-01-01 PROCEDURE — 87070 CULTURE OTHR SPECIMN AEROBIC: CPT

## 2023-01-01 PROCEDURE — 83615 LACTATE (LD) (LDH) ENZYME: CPT

## 2023-01-01 PROCEDURE — 87077 CULTURE AEROBIC IDENTIFY: CPT

## 2023-01-01 PROCEDURE — 99232 SBSQ HOSP IP/OBS MODERATE 35: CPT

## 2023-01-01 PROCEDURE — 80076 HEPATIC FUNCTION PANEL: CPT

## 2023-01-01 PROCEDURE — P9047 ALBUMIN (HUMAN), 25%, 50ML: HCPCS | Performed by: STUDENT IN AN ORGANIZED HEALTH CARE EDUCATION/TRAINING PROGRAM

## 2023-01-01 PROCEDURE — 97530 THERAPEUTIC ACTIVITIES: CPT

## 2023-01-01 PROCEDURE — 82306 VITAMIN D 25 HYDROXY: CPT

## 2023-01-01 PROCEDURE — 86923 COMPATIBILITY TEST ELECTRIC: CPT

## 2023-01-01 PROCEDURE — 6360000002 HC RX W HCPCS

## 2023-01-01 PROCEDURE — 36569 INSJ PICC 5 YR+ W/O IMAGING: CPT

## 2023-01-01 PROCEDURE — 84145 PROCALCITONIN (PCT): CPT

## 2023-01-01 PROCEDURE — 91200 LIVER ELASTOGRAPHY: CPT

## 2023-01-01 PROCEDURE — 70450 CT HEAD/BRAIN W/O DYE: CPT

## 2023-01-01 PROCEDURE — 82803 BLOOD GASES ANY COMBINATION: CPT

## 2023-01-01 PROCEDURE — 99232 SBSQ HOSP IP/OBS MODERATE 35: CPT | Performed by: PHYSICIAN ASSISTANT

## 2023-01-01 PROCEDURE — 85045 AUTOMATED RETICULOCYTE COUNT: CPT

## 2023-01-01 PROCEDURE — 0W9G3ZZ DRAINAGE OF PERITONEAL CAVITY, PERCUTANEOUS APPROACH: ICD-10-PCS | Performed by: STUDENT IN AN ORGANIZED HEALTH CARE EDUCATION/TRAINING PROGRAM

## 2023-01-01 PROCEDURE — 36591 DRAW BLOOD OFF VENOUS DEVICE: CPT

## 2023-01-01 PROCEDURE — 87088 URINE BACTERIA CULTURE: CPT

## 2023-01-01 PROCEDURE — 87081 CULTURE SCREEN ONLY: CPT

## 2023-01-01 PROCEDURE — 82947 ASSAY GLUCOSE BLOOD QUANT: CPT

## 2023-01-01 PROCEDURE — 87186 SC STD MICRODIL/AGAR DIL: CPT

## 2023-01-01 PROCEDURE — 0W9G3ZZ DRAINAGE OF PERITONEAL CAVITY, PERCUTANEOUS APPROACH: ICD-10-PCS | Performed by: CHIROPRACTOR

## 2023-01-01 PROCEDURE — 51701 INSERT BLADDER CATHETER: CPT

## 2023-01-01 PROCEDURE — APPSS45 APP SPLIT SHARED TIME 31-45 MINUTES: Performed by: PHYSICIAN ASSISTANT

## 2023-01-01 PROCEDURE — P9016 RBC LEUKOCYTES REDUCED: HCPCS

## 2023-01-01 PROCEDURE — 6360000002 HC RX W HCPCS: Performed by: PHYSICIAN ASSISTANT

## 2023-01-01 PROCEDURE — 82042 OTHER SOURCE ALBUMIN QUAN EA: CPT

## 2023-01-01 PROCEDURE — 84300 ASSAY OF URINE SODIUM: CPT

## 2023-01-01 PROCEDURE — 74174 CTA ABD&PLVS W/CONTRAST: CPT

## 2023-01-01 PROCEDURE — 99253 IP/OBS CNSLTJ NEW/EST LOW 45: CPT | Performed by: SURGERY

## 2023-01-01 PROCEDURE — 99232 SBSQ HOSP IP/OBS MODERATE 35: CPT | Performed by: INTERNAL MEDICINE

## 2023-01-01 PROCEDURE — 94667 MNPJ CHEST WALL 1ST: CPT

## 2023-01-01 PROCEDURE — 95819 EEG AWAKE AND ASLEEP: CPT

## 2023-01-01 PROCEDURE — 83540 ASSAY OF IRON: CPT

## 2023-01-01 PROCEDURE — 99223 1ST HOSP IP/OBS HIGH 75: CPT | Performed by: INTERNAL MEDICINE

## 2023-01-01 PROCEDURE — 99285 EMERGENCY DEPT VISIT HI MDM: CPT

## 2023-01-01 PROCEDURE — 5A1D70Z PERFORMANCE OF URINARY FILTRATION, INTERMITTENT, LESS THAN 6 HOURS PER DAY: ICD-10-PCS | Performed by: INTERNAL MEDICINE

## 2023-01-01 PROCEDURE — 51702 INSERT TEMP BLADDER CATH: CPT

## 2023-01-01 PROCEDURE — 36556 INSERT NON-TUNNEL CV CATH: CPT | Performed by: INTERNAL MEDICINE

## 2023-01-01 PROCEDURE — 84133 ASSAY OF URINE POTASSIUM: CPT

## 2023-01-01 PROCEDURE — 93005 ELECTROCARDIOGRAM TRACING: CPT

## 2023-01-01 PROCEDURE — 83036 HEMOGLOBIN GLYCOSYLATED A1C: CPT

## 2023-01-01 PROCEDURE — 82728 ASSAY OF FERRITIN: CPT

## 2023-01-01 PROCEDURE — 96374 THER/PROPH/DIAG INJ IV PUSH: CPT

## 2023-01-01 PROCEDURE — P9047 ALBUMIN (HUMAN), 25%, 50ML: HCPCS | Performed by: INTERNAL MEDICINE

## 2023-01-01 RX ORDER — POTASSIUM CHLORIDE 7.45 MG/ML
10 INJECTION INTRAVENOUS ONCE
Status: DISCONTINUED | OUTPATIENT
Start: 2023-01-01 | End: 2023-01-01

## 2023-01-01 RX ORDER — DIPHENHYDRAMINE HYDROCHLORIDE 50 MG/ML
50 INJECTION INTRAMUSCULAR; INTRAVENOUS ONCE
Status: COMPLETED | OUTPATIENT
Start: 2023-01-01 | End: 2023-01-01

## 2023-01-01 RX ORDER — SODIUM CHLORIDE 0.9 % (FLUSH) 0.9 %
10 SYRINGE (ML) INJECTION PRN
Status: DISCONTINUED | OUTPATIENT
Start: 2023-01-01 | End: 2023-01-01

## 2023-01-01 RX ORDER — ACETAMINOPHEN 325 MG/1
650 TABLET ORAL EVERY 6 HOURS PRN
Status: DISCONTINUED | OUTPATIENT
Start: 2023-01-01 | End: 2023-04-20 | Stop reason: HOSPADM

## 2023-01-01 RX ORDER — LORAZEPAM 2 MG/ML
1 INJECTION INTRAMUSCULAR
Status: DISCONTINUED | OUTPATIENT
Start: 2023-01-01 | End: 2023-04-20 | Stop reason: HOSPADM

## 2023-01-01 RX ORDER — HYDRALAZINE HYDROCHLORIDE 25 MG/1
25 TABLET, FILM COATED ORAL EVERY 8 HOURS SCHEDULED
Status: DISCONTINUED | OUTPATIENT
Start: 2023-01-01 | End: 2023-01-01

## 2023-01-01 RX ORDER — AMLODIPINE BESYLATE 5 MG/1
10 TABLET ORAL DAILY
Status: DISCONTINUED | OUTPATIENT
Start: 2023-01-01 | End: 2023-01-01

## 2023-01-01 RX ORDER — ALBUMIN (HUMAN) 12.5 G/50ML
25 SOLUTION INTRAVENOUS EVERY 6 HOURS
Status: COMPLETED | OUTPATIENT
Start: 2023-01-01 | End: 2023-01-01

## 2023-01-01 RX ORDER — FENTANYL CITRATE 50 UG/ML
25 INJECTION, SOLUTION INTRAMUSCULAR; INTRAVENOUS
Status: DISCONTINUED | OUTPATIENT
Start: 2023-01-01 | End: 2023-01-01

## 2023-01-01 RX ORDER — ASPIRIN 81 MG/1
81 TABLET ORAL DAILY
Status: DISCONTINUED | OUTPATIENT
Start: 2023-01-01 | End: 2023-01-01 | Stop reason: SDUPTHER

## 2023-01-01 RX ORDER — POLYVINYL ALCOHOL 14 MG/ML
1 SOLUTION/ DROPS OPHTHALMIC EVERY 6 HOURS PRN
Status: DISCONTINUED | OUTPATIENT
Start: 2023-01-01 | End: 2023-04-20 | Stop reason: HOSPADM

## 2023-01-01 RX ORDER — DEXTROSE MONOHYDRATE 50 MG/ML
INJECTION, SOLUTION INTRAVENOUS CONTINUOUS
Status: DISCONTINUED | OUTPATIENT
Start: 2023-01-01 | End: 2023-01-01

## 2023-01-01 RX ORDER — SODIUM CHLORIDE, SODIUM LACTATE, POTASSIUM CHLORIDE, AND CALCIUM CHLORIDE .6; .31; .03; .02 G/100ML; G/100ML; G/100ML; G/100ML
1000 INJECTION, SOLUTION INTRAVENOUS ONCE
Status: DISCONTINUED | OUTPATIENT
Start: 2023-01-01 | End: 2023-01-01

## 2023-01-01 RX ORDER — MORPHINE SULFATE 4 MG/ML
4 INJECTION, SOLUTION INTRAMUSCULAR; INTRAVENOUS
Status: DISCONTINUED | OUTPATIENT
Start: 2023-01-01 | End: 2023-01-01

## 2023-01-01 RX ORDER — FENTANYL CITRATE 50 UG/ML
50 INJECTION, SOLUTION INTRAMUSCULAR; INTRAVENOUS
Status: DISCONTINUED | OUTPATIENT
Start: 2023-01-01 | End: 2023-01-01

## 2023-01-01 RX ORDER — SUCCINYLCHOLINE CHLORIDE 20 MG/ML
INJECTION INTRAMUSCULAR; INTRAVENOUS
Status: COMPLETED
Start: 2023-01-01 | End: 2023-01-01

## 2023-01-01 RX ORDER — MIDODRINE HYDROCHLORIDE 10 MG/1
10 TABLET ORAL
Qty: 1 TABLET | Refills: 0
Start: 2023-01-01

## 2023-01-01 RX ORDER — FENTANYL CITRATE 50 UG/ML
25 INJECTION, SOLUTION INTRAMUSCULAR; INTRAVENOUS
Qty: 1 EACH | Refills: 0
Start: 2023-01-01 | End: 2023-04-22

## 2023-01-01 RX ORDER — PANTOPRAZOLE SODIUM 40 MG/1
40 TABLET, DELAYED RELEASE ORAL
Status: DISCONTINUED | OUTPATIENT
Start: 2023-01-01 | End: 2023-01-01

## 2023-01-01 RX ORDER — METOPROLOL SUCCINATE 50 MG/1
50 TABLET, EXTENDED RELEASE ORAL DAILY
Status: DISCONTINUED | OUTPATIENT
Start: 2023-01-01 | End: 2023-01-01

## 2023-01-01 RX ORDER — M-VIT,TX,IRON,MINS/CALC/FOLIC 27MG-0.4MG
1 TABLET ORAL DAILY
Status: DISCONTINUED | OUTPATIENT
Start: 2023-01-01 | End: 2023-01-01

## 2023-01-01 RX ORDER — MAGNESIUM SULFATE 1 G/100ML
1000 INJECTION INTRAVENOUS PRN
Status: DISCONTINUED | OUTPATIENT
Start: 2023-01-01 | End: 2023-01-01

## 2023-01-01 RX ORDER — ALBUMIN (HUMAN) 12.5 G/50ML
25 SOLUTION INTRAVENOUS ONCE
Status: COMPLETED | OUTPATIENT
Start: 2023-01-01 | End: 2023-01-01

## 2023-01-01 RX ORDER — DEXTROSE AND SODIUM CHLORIDE 5; .2 G/100ML; G/100ML
INJECTION, SOLUTION INTRAVENOUS CONTINUOUS
Status: DISCONTINUED | OUTPATIENT
Start: 2023-01-01 | End: 2023-01-01

## 2023-01-01 RX ORDER — PANTOPRAZOLE SODIUM 40 MG/10ML
40 INJECTION, POWDER, LYOPHILIZED, FOR SOLUTION INTRAVENOUS 2 TIMES DAILY
Status: DISCONTINUED | OUTPATIENT
Start: 2023-01-01 | End: 2023-01-01

## 2023-01-01 RX ORDER — BISACODYL 10 MG
10 SUPPOSITORY, RECTAL RECTAL DAILY
Status: DISCONTINUED | OUTPATIENT
Start: 2023-01-01 | End: 2023-01-01 | Stop reason: ALTCHOICE

## 2023-01-01 RX ORDER — POTASSIUM CHLORIDE 7.45 MG/ML
10 INJECTION INTRAVENOUS
Status: DISCONTINUED | OUTPATIENT
Start: 2023-01-01 | End: 2023-01-01

## 2023-01-01 RX ORDER — ALBUTEROL SULFATE 2.5 MG/3ML
2.5 SOLUTION RESPIRATORY (INHALATION) EVERY 4 HOURS PRN
Status: DISCONTINUED | OUTPATIENT
Start: 2023-01-01 | End: 2023-01-01

## 2023-01-01 RX ORDER — HEPARIN SODIUM 1000 [USP'U]/ML
2500 INJECTION, SOLUTION INTRAVENOUS; SUBCUTANEOUS PRN
Status: DISCONTINUED | OUTPATIENT
Start: 2023-01-01 | End: 2023-01-01

## 2023-01-01 RX ORDER — IPRATROPIUM BROMIDE AND ALBUTEROL SULFATE 2.5; .5 MG/3ML; MG/3ML
1 SOLUTION RESPIRATORY (INHALATION) 3 TIMES DAILY
Status: DISCONTINUED | OUTPATIENT
Start: 2023-01-01 | End: 2023-01-01

## 2023-01-01 RX ORDER — POLYETHYLENE GLYCOL 3350 17 G/17G
17 POWDER, FOR SOLUTION ORAL 2 TIMES DAILY
Status: DISCONTINUED | OUTPATIENT
Start: 2023-01-01 | End: 2023-01-01

## 2023-01-01 RX ORDER — DEXTROSE AND SODIUM CHLORIDE 5; .2 G/100ML; G/100ML
INJECTION, SOLUTION INTRAVENOUS CONTINUOUS
Status: ACTIVE | OUTPATIENT
Start: 2023-01-01 | End: 2023-01-01

## 2023-01-01 RX ORDER — DIGOXIN 0.25 MG/ML
250 INJECTION INTRAMUSCULAR; INTRAVENOUS EVERY 4 HOURS
Status: COMPLETED | OUTPATIENT
Start: 2023-01-01 | End: 2023-01-01

## 2023-01-01 RX ORDER — CHOLECALCIFEROL (VITAMIN D3) 50 MCG
6000 TABLET ORAL DAILY
Qty: 30 TABLET | Refills: 0
Start: 2023-04-20

## 2023-01-01 RX ORDER — HEPARIN SODIUM 1000 [USP'U]/ML
INJECTION, SOLUTION INTRAVENOUS; SUBCUTANEOUS
Status: DISPENSED
Start: 2023-01-01 | End: 2023-01-01

## 2023-01-01 RX ORDER — ETOMIDATE 2 MG/ML
20 INJECTION, SOLUTION INTRAVENOUS ONCE
Status: COMPLETED | OUTPATIENT
Start: 2023-01-01 | End: 2023-01-01

## 2023-01-01 RX ORDER — NOREPINEPHRINE BIT/0.9 % NACL 16MG/250ML
1-100 INFUSION BOTTLE (ML) INTRAVENOUS CONTINUOUS
Status: DISCONTINUED | OUTPATIENT
Start: 2023-01-01 | End: 2023-01-01

## 2023-01-01 RX ORDER — SODIUM CHLORIDE 0.9 % (FLUSH) 0.9 %
10 SYRINGE (ML) INJECTION EVERY 12 HOURS SCHEDULED
Status: DISCONTINUED | OUTPATIENT
Start: 2023-01-01 | End: 2023-01-01

## 2023-01-01 RX ORDER — POLYVINYL ALCOHOL 14 MG/ML
1 SOLUTION/ DROPS OPHTHALMIC EVERY 6 HOURS PRN
Qty: 1 EACH | Refills: 4
Start: 2023-01-01 | End: 2023-05-19

## 2023-01-01 RX ORDER — LACTULOSE 10 G/15ML
20 SOLUTION ORAL 3 TIMES DAILY
Qty: 1 EACH | Refills: 1
Start: 2023-01-01

## 2023-01-01 RX ORDER — CALCIUM GLUCONATE 10 MG/ML
1000 INJECTION, SOLUTION INTRAVENOUS PRN
Status: DISCONTINUED | OUTPATIENT
Start: 2023-01-01 | End: 2023-01-01

## 2023-01-01 RX ORDER — HEPARIN SODIUM (PORCINE) LOCK FLUSH IV SOLN 100 UNIT/ML 100 UNIT/ML
100 SOLUTION INTRAVENOUS PRN
Status: DISCONTINUED | OUTPATIENT
Start: 2023-01-01 | End: 2023-01-01

## 2023-01-01 RX ORDER — POTASSIUM CHLORIDE 29.8 MG/ML
40 INJECTION INTRAVENOUS ONCE
Status: COMPLETED | OUTPATIENT
Start: 2023-01-01 | End: 2023-01-01

## 2023-01-01 RX ORDER — FENTANYL CITRATE 50 UG/ML
INJECTION, SOLUTION INTRAMUSCULAR; INTRAVENOUS
Status: COMPLETED
Start: 2023-01-01 | End: 2023-01-01

## 2023-01-01 RX ORDER — CHLORHEXIDINE GLUCONATE 0.12 MG/ML
15 RINSE ORAL 2 TIMES DAILY
Status: DISCONTINUED | OUTPATIENT
Start: 2023-01-01 | End: 2023-01-01

## 2023-01-01 RX ORDER — MORPHINE SULFATE 2 MG/ML
2 INJECTION, SOLUTION INTRAMUSCULAR; INTRAVENOUS
Status: DISCONTINUED | OUTPATIENT
Start: 2023-01-01 | End: 2023-04-20 | Stop reason: HOSPADM

## 2023-01-01 RX ORDER — CHOLECALCIFEROL (VITAMIN D3) 50 MCG
6000 TABLET ORAL DAILY
Status: DISCONTINUED | OUTPATIENT
Start: 2023-01-01 | End: 2023-01-01

## 2023-01-01 RX ORDER — VALSARTAN 40 MG/1
20 TABLET ORAL DAILY
Qty: 30 TABLET | Refills: 3
Start: 2023-01-01

## 2023-01-01 RX ORDER — PHENYLEPHRINE HYDROCHLORIDE 10 MG/ML
INJECTION INTRAVENOUS
Status: DISPENSED
Start: 2023-01-01 | End: 2023-01-01

## 2023-01-01 RX ORDER — ASPIRIN 81 MG/1
81 TABLET, CHEWABLE ORAL DAILY
Status: DISCONTINUED | OUTPATIENT
Start: 2023-01-01 | End: 2023-01-01

## 2023-01-01 RX ORDER — MINERAL OIL AND WHITE PETROLATUM 150; 830 MG/G; MG/G
OINTMENT OPHTHALMIC PRN
Qty: 1 EACH | Refills: 0
Start: 2023-01-01

## 2023-01-01 RX ORDER — 0.9 % SODIUM CHLORIDE 0.9 %
250 INTRAVENOUS SOLUTION INTRAVENOUS ONCE
Status: COMPLETED | OUTPATIENT
Start: 2023-01-01 | End: 2023-01-01

## 2023-01-01 RX ORDER — ONDANSETRON 2 MG/ML
4 INJECTION INTRAMUSCULAR; INTRAVENOUS EVERY 6 HOURS PRN
Status: DISCONTINUED | OUTPATIENT
Start: 2023-01-01 | End: 2023-01-01

## 2023-01-01 RX ORDER — SODIUM CHLORIDE 9 MG/ML
INJECTION, SOLUTION INTRAVENOUS PRN
Status: DISCONTINUED | OUTPATIENT
Start: 2023-01-01 | End: 2023-01-01

## 2023-01-01 RX ORDER — LORAZEPAM 2 MG/ML
1 INJECTION INTRAMUSCULAR EVERY 6 HOURS PRN
Status: DISCONTINUED | OUTPATIENT
Start: 2023-01-01 | End: 2023-01-01

## 2023-01-01 RX ORDER — VECURONIUM BROMIDE 1 MG/ML
INJECTION, POWDER, LYOPHILIZED, FOR SOLUTION INTRAVENOUS
Status: DISCONTINUED
Start: 2023-01-01 | End: 2023-01-01 | Stop reason: WASHOUT

## 2023-01-01 RX ORDER — MIDAZOLAM HYDROCHLORIDE 2 MG/2ML
2 INJECTION, SOLUTION INTRAMUSCULAR; INTRAVENOUS ONCE
Status: COMPLETED | OUTPATIENT
Start: 2023-01-01 | End: 2023-01-01

## 2023-01-01 RX ORDER — BUDESONIDE 0.5 MG/2ML
500 INHALANT ORAL 2 TIMES DAILY
Qty: 60 EACH | Refills: 3
Start: 2023-01-01

## 2023-01-01 RX ORDER — GUAIFENESIN 200 MG/10ML
200 LIQUID ORAL EVERY 4 HOURS PRN
Status: DISCONTINUED | OUTPATIENT
Start: 2023-01-01 | End: 2023-01-01

## 2023-01-01 RX ORDER — INSULIN LISPRO 100 [IU]/ML
0-4 INJECTION, SOLUTION INTRAVENOUS; SUBCUTANEOUS EVERY 4 HOURS
Status: DISCONTINUED | OUTPATIENT
Start: 2023-01-01 | End: 2023-01-01

## 2023-01-01 RX ORDER — ROCURONIUM BROMIDE 10 MG/ML
100 INJECTION, SOLUTION INTRAVENOUS ONCE
Status: COMPLETED | OUTPATIENT
Start: 2023-01-01 | End: 2023-01-01

## 2023-01-01 RX ORDER — FUROSEMIDE 10 MG/ML
40 INJECTION INTRAMUSCULAR; INTRAVENOUS 2 TIMES DAILY
Status: DISCONTINUED | OUTPATIENT
Start: 2023-01-01 | End: 2023-01-01

## 2023-01-01 RX ORDER — IPRATROPIUM BROMIDE AND ALBUTEROL SULFATE 2.5; .5 MG/3ML; MG/3ML
1 SOLUTION RESPIRATORY (INHALATION) 3 TIMES DAILY PRN
Status: DISCONTINUED | OUTPATIENT
Start: 2023-01-01 | End: 2023-01-01

## 2023-01-01 RX ORDER — MORPHINE SULFATE 4 MG/ML
4 INJECTION, SOLUTION INTRAMUSCULAR; INTRAVENOUS
Status: DISCONTINUED | OUTPATIENT
Start: 2023-01-01 | End: 2023-04-20 | Stop reason: HOSPADM

## 2023-01-01 RX ORDER — SODIUM CHLORIDE 9 MG/ML
INJECTION, SOLUTION INTRAVENOUS PRN
Status: DISCONTINUED | OUTPATIENT
Start: 2023-01-01 | End: 2023-04-20 | Stop reason: HOSPADM

## 2023-01-01 RX ORDER — FENTANYL CITRATE 50 UG/ML
100 INJECTION, SOLUTION INTRAMUSCULAR; INTRAVENOUS ONCE
Status: COMPLETED | OUTPATIENT
Start: 2023-01-01 | End: 2023-01-01

## 2023-01-01 RX ORDER — MAGNESIUM SULFATE IN WATER 40 MG/ML
2000 INJECTION, SOLUTION INTRAVENOUS ONCE
Status: COMPLETED | OUTPATIENT
Start: 2023-01-01 | End: 2023-01-01

## 2023-01-01 RX ORDER — METOPROLOL TARTRATE 5 MG/5ML
2.5 INJECTION INTRAVENOUS ONCE
Status: COMPLETED | OUTPATIENT
Start: 2023-01-01 | End: 2023-01-01

## 2023-01-01 RX ORDER — SODIUM CHLORIDE FOR INHALATION 3 %
4 VIAL, NEBULIZER (ML) INHALATION PRN
Status: DISCONTINUED | OUTPATIENT
Start: 2023-01-01 | End: 2023-01-01

## 2023-01-01 RX ORDER — BUMETANIDE 0.25 MG/ML
2 INJECTION INTRAMUSCULAR; INTRAVENOUS 2 TIMES DAILY
Status: DISCONTINUED | OUTPATIENT
Start: 2023-01-01 | End: 2023-01-01

## 2023-01-01 RX ORDER — ATORVASTATIN CALCIUM 40 MG/1
40 TABLET, FILM COATED ORAL NIGHTLY
Status: DISCONTINUED | OUTPATIENT
Start: 2023-01-01 | End: 2023-01-01

## 2023-01-01 RX ORDER — VALSARTAN 40 MG/1
20 TABLET ORAL DAILY
Status: DISCONTINUED | OUTPATIENT
Start: 2023-01-01 | End: 2023-01-01

## 2023-01-01 RX ORDER — BISACODYL 10 MG
10 SUPPOSITORY, RECTAL RECTAL EVERY 12 HOURS
Qty: 60 SUPPOSITORY | Refills: 0
Start: 2023-01-01 | End: 2023-05-19

## 2023-01-01 RX ORDER — ACETAMINOPHEN 650 MG/1
650 SUPPOSITORY RECTAL EVERY 6 HOURS PRN
Status: DISCONTINUED | OUTPATIENT
Start: 2023-01-01 | End: 2023-04-20 | Stop reason: HOSPADM

## 2023-01-01 RX ORDER — PANTOPRAZOLE SODIUM 40 MG/10ML
40 INJECTION, POWDER, LYOPHILIZED, FOR SOLUTION INTRAVENOUS 2 TIMES DAILY
Qty: 1 EACH | Refills: 0
Start: 2023-01-01

## 2023-01-01 RX ORDER — POLYETHYLENE GLYCOL 3350 17 G/17G
17 POWDER, FOR SOLUTION ORAL DAILY
Status: DISCONTINUED | OUTPATIENT
Start: 2023-01-01 | End: 2023-01-01

## 2023-01-01 RX ORDER — ISOSORBIDE MONONITRATE 30 MG/1
30 TABLET, EXTENDED RELEASE ORAL DAILY
Status: DISCONTINUED | OUTPATIENT
Start: 2023-01-01 | End: 2023-01-01

## 2023-01-01 RX ORDER — PROPOFOL 10 MG/ML
INJECTION, EMULSION INTRAVENOUS
Status: DISPENSED
Start: 2023-01-01 | End: 2023-01-01

## 2023-01-01 RX ORDER — SODIUM CHLORIDE 450 MG/100ML
INJECTION, SOLUTION INTRAVENOUS CONTINUOUS
Status: DISCONTINUED | OUTPATIENT
Start: 2023-01-01 | End: 2023-01-01

## 2023-01-01 RX ORDER — ARFORMOTEROL TARTRATE 15 UG/2ML
15 SOLUTION RESPIRATORY (INHALATION) 2 TIMES DAILY
Qty: 120 ML | Refills: 3
Start: 2023-01-01

## 2023-01-01 RX ORDER — TAMSULOSIN HYDROCHLORIDE 0.4 MG/1
0.4 CAPSULE ORAL DAILY
Status: DISCONTINUED | OUTPATIENT
Start: 2023-01-01 | End: 2023-01-01

## 2023-01-01 RX ORDER — ANTICOAGULANT SODIUM CITRATE SOLUTION 4 G/100ML
SOLUTION INTRAVENOUS CONTINUOUS
Status: DISCONTINUED | OUTPATIENT
Start: 2023-01-01 | End: 2023-01-01

## 2023-01-01 RX ORDER — MINERAL OIL AND WHITE PETROLATUM 150; 830 MG/G; MG/G
OINTMENT OPHTHALMIC PRN
Status: DISCONTINUED | OUTPATIENT
Start: 2023-01-01 | End: 2023-01-01

## 2023-01-01 RX ORDER — GABAPENTIN 100 MG/1
200 CAPSULE ORAL 3 TIMES DAILY
Status: DISCONTINUED | OUTPATIENT
Start: 2023-01-01 | End: 2023-01-01

## 2023-01-01 RX ORDER — METOPROLOL TARTRATE 5 MG/5ML
5 INJECTION INTRAVENOUS ONCE
Status: COMPLETED | OUTPATIENT
Start: 2023-01-01 | End: 2023-01-01

## 2023-01-01 RX ORDER — ETOMIDATE 2 MG/ML
INJECTION INTRAVENOUS
Status: COMPLETED
Start: 2023-01-01 | End: 2023-01-01

## 2023-01-01 RX ORDER — ROCURONIUM BROMIDE 10 MG/ML
INJECTION, SOLUTION INTRAVENOUS
Status: DISPENSED
Start: 2023-01-01 | End: 2023-01-01

## 2023-01-01 RX ORDER — MINERAL OIL AND WHITE PETROLATUM 150; 830 MG/G; MG/G
OINTMENT OPHTHALMIC PRN
Status: DISCONTINUED | OUTPATIENT
Start: 2023-01-01 | End: 2023-01-01 | Stop reason: SDUPTHER

## 2023-01-01 RX ORDER — POLYETHYLENE GLYCOL 3350 17 G/17G
17 POWDER, FOR SOLUTION ORAL 2 TIMES DAILY
Qty: 527 G | Refills: 1
Start: 2023-01-01 | End: 2023-05-19

## 2023-01-01 RX ORDER — PROPOFOL 10 MG/ML
INJECTION, EMULSION INTRAVENOUS
Status: COMPLETED
Start: 2023-01-01 | End: 2023-01-01

## 2023-01-01 RX ORDER — NOREPINEPHRINE BIT/0.9 % NACL 16MG/250ML
INFUSION BOTTLE (ML) INTRAVENOUS
Status: DISPENSED
Start: 2023-01-01 | End: 2023-01-01

## 2023-01-01 RX ORDER — INSULIN LISPRO 100 [IU]/ML
0-4 INJECTION, SOLUTION INTRAVENOUS; SUBCUTANEOUS NIGHTLY
Status: DISCONTINUED | OUTPATIENT
Start: 2023-01-01 | End: 2023-01-01

## 2023-01-01 RX ORDER — 0.9 % SODIUM CHLORIDE 0.9 %
1000 INTRAVENOUS SOLUTION INTRAVENOUS
Status: ACTIVE | OUTPATIENT
Start: 2023-01-01 | End: 2023-01-01

## 2023-01-01 RX ORDER — FUROSEMIDE 10 MG/ML
40 INJECTION INTRAMUSCULAR; INTRAVENOUS ONCE
Status: COMPLETED | OUTPATIENT
Start: 2023-01-01 | End: 2023-01-01

## 2023-01-01 RX ORDER — PANTOPRAZOLE SODIUM 40 MG/10ML
40 INJECTION, POWDER, LYOPHILIZED, FOR SOLUTION INTRAVENOUS DAILY
Status: DISCONTINUED | OUTPATIENT
Start: 2023-01-01 | End: 2023-01-01

## 2023-01-01 RX ORDER — POLYETHYLENE GLYCOL 3350 17 G/17G
17 POWDER, FOR SOLUTION ORAL DAILY PRN
Status: DISCONTINUED | OUTPATIENT
Start: 2023-01-01 | End: 2023-01-01

## 2023-01-01 RX ORDER — DIMETHICONE, OXYBENZONE, AND PADIMATE O 2; 2.5; 6.6 G/100G; G/100G; G/100G
STICK TOPICAL PRN
Status: DISCONTINUED | OUTPATIENT
Start: 2023-01-01 | End: 2023-04-20 | Stop reason: HOSPADM

## 2023-01-01 RX ORDER — IPRATROPIUM BROMIDE AND ALBUTEROL SULFATE 2.5; .5 MG/3ML; MG/3ML
1 SOLUTION RESPIRATORY (INHALATION) 3 TIMES DAILY PRN
Qty: 360 ML | Refills: 0
Start: 2023-01-01

## 2023-01-01 RX ORDER — VALSARTAN 40 MG/1
40 TABLET ORAL 2 TIMES DAILY
Status: DISCONTINUED | OUTPATIENT
Start: 2023-01-01 | End: 2023-01-01

## 2023-01-01 RX ORDER — CALCIUM GLUCONATE 94 MG/ML
1000 INJECTION, SOLUTION INTRAVENOUS ONCE
Status: DISCONTINUED | OUTPATIENT
Start: 2023-01-01 | End: 2023-01-01 | Stop reason: SDUPTHER

## 2023-01-01 RX ORDER — MIDAZOLAM HYDROCHLORIDE 1 MG/ML
INJECTION INTRAMUSCULAR; INTRAVENOUS
Status: COMPLETED
Start: 2023-01-01 | End: 2023-01-01

## 2023-01-01 RX ORDER — MIDODRINE HYDROCHLORIDE 10 MG/1
10 TABLET ORAL
Status: DISCONTINUED | OUTPATIENT
Start: 2023-01-01 | End: 2023-01-01

## 2023-01-01 RX ORDER — ETOMIDATE 2 MG/ML
INJECTION INTRAVENOUS
Status: DISPENSED
Start: 2023-01-01 | End: 2023-01-01

## 2023-01-01 RX ORDER — MIDAZOLAM HYDROCHLORIDE 1 MG/ML
1-10 INJECTION, SOLUTION INTRAVENOUS CONTINUOUS
Status: DISCONTINUED | OUTPATIENT
Start: 2023-01-01 | End: 2023-01-01

## 2023-01-01 RX ORDER — LACTULOSE 10 G/15ML
20 SOLUTION ORAL 3 TIMES DAILY
Status: DISCONTINUED | OUTPATIENT
Start: 2023-01-01 | End: 2023-01-01

## 2023-01-01 RX ORDER — DEXTROSE MONOHYDRATE 100 MG/ML
INJECTION, SOLUTION INTRAVENOUS CONTINUOUS PRN
Status: DISCONTINUED | OUTPATIENT
Start: 2023-01-01 | End: 2023-01-01

## 2023-01-01 RX ORDER — BUDESONIDE 0.5 MG/2ML
500 INHALANT ORAL 2 TIMES DAILY
Status: DISCONTINUED | OUTPATIENT
Start: 2023-01-01 | End: 2023-01-01

## 2023-01-01 RX ORDER — ROCURONIUM BROMIDE 10 MG/ML
20 INJECTION, SOLUTION INTRAVENOUS ONCE
Status: DISCONTINUED | OUTPATIENT
Start: 2023-01-01 | End: 2023-01-01

## 2023-01-01 RX ORDER — LEVETIRACETAM 500 MG/5ML
250 INJECTION, SOLUTION, CONCENTRATE INTRAVENOUS EVERY 12 HOURS
Status: DISCONTINUED | OUTPATIENT
Start: 2023-01-01 | End: 2023-01-01

## 2023-01-01 RX ORDER — INSULIN GLARGINE-YFGN 100 [IU]/ML
25 INJECTION, SOLUTION SUBCUTANEOUS 2 TIMES DAILY
Status: DISCONTINUED | OUTPATIENT
Start: 2023-01-01 | End: 2023-01-01

## 2023-01-01 RX ORDER — DEXTROSE MONOHYDRATE 50 MG/ML
INJECTION, SOLUTION INTRAVENOUS CONTINUOUS
Status: DISCONTINUED | OUTPATIENT
Start: 2023-01-01 | End: 2023-01-01 | Stop reason: SDUPTHER

## 2023-01-01 RX ORDER — BISACODYL 10 MG
10 SUPPOSITORY, RECTAL RECTAL EVERY 12 HOURS
Status: DISCONTINUED | OUTPATIENT
Start: 2023-01-01 | End: 2023-01-01

## 2023-01-01 RX ORDER — MORPHINE SULFATE 2 MG/ML
2 INJECTION, SOLUTION INTRAMUSCULAR; INTRAVENOUS
Status: DISCONTINUED | OUTPATIENT
Start: 2023-01-01 | End: 2023-01-01

## 2023-01-01 RX ORDER — LEVETIRACETAM 500 MG/5ML
1000 INJECTION, SOLUTION, CONCENTRATE INTRAVENOUS EVERY 12 HOURS
Status: DISCONTINUED | OUTPATIENT
Start: 2023-01-01 | End: 2023-01-01

## 2023-01-01 RX ORDER — GUAIFENESIN 200 MG/10ML
200 LIQUID ORAL EVERY 4 HOURS PRN
Qty: 1 EACH | Refills: 0
Start: 2023-01-01

## 2023-01-01 RX ORDER — SODIUM CHLORIDE FOR INHALATION 3 %
4 VIAL, NEBULIZER (ML) INHALATION PRN
Qty: 1 ML | Refills: 0
Start: 2023-01-01

## 2023-01-01 RX ORDER — CALCIUM GLUCONATE 10 MG/ML
1000 INJECTION, SOLUTION INTRAVENOUS ONCE
Status: COMPLETED | OUTPATIENT
Start: 2023-01-01 | End: 2023-01-01

## 2023-01-01 RX ORDER — ARFORMOTEROL TARTRATE 15 UG/2ML
15 SOLUTION RESPIRATORY (INHALATION) 2 TIMES DAILY
Status: DISCONTINUED | OUTPATIENT
Start: 2023-01-01 | End: 2023-01-01

## 2023-01-01 RX ORDER — FUROSEMIDE 10 MG/ML
60 INJECTION INTRAMUSCULAR; INTRAVENOUS 2 TIMES DAILY
Status: DISCONTINUED | OUTPATIENT
Start: 2023-01-01 | End: 2023-01-01

## 2023-01-01 RX ORDER — DOXAZOSIN 2 MG/1
2 TABLET ORAL DAILY
Status: DISCONTINUED | OUTPATIENT
Start: 2023-01-01 | End: 2023-01-01

## 2023-01-01 RX ORDER — GLYCOPYRROLATE 0.2 MG/ML
0.2 INJECTION INTRAMUSCULAR; INTRAVENOUS EVERY 4 HOURS PRN
Status: DISCONTINUED | OUTPATIENT
Start: 2023-01-01 | End: 2023-04-20 | Stop reason: HOSPADM

## 2023-01-01 RX ORDER — INSULIN LISPRO 100 [IU]/ML
0-4 INJECTION, SOLUTION INTRAVENOUS; SUBCUTANEOUS
Status: DISCONTINUED | OUTPATIENT
Start: 2023-01-01 | End: 2023-01-01

## 2023-01-01 RX ORDER — CHLORHEXIDINE GLUCONATE 0.12 MG/ML
15 RINSE ORAL 2 TIMES DAILY
Qty: 420 ML | Refills: 0
Start: 2023-01-01 | End: 2023-05-03

## 2023-01-01 RX ORDER — PROPOFOL 10 MG/ML
5-50 INJECTION, EMULSION INTRAVENOUS CONTINUOUS
Status: DISCONTINUED | OUTPATIENT
Start: 2023-01-01 | End: 2023-01-01

## 2023-01-01 RX ORDER — PROMETHAZINE HYDROCHLORIDE 12.5 MG/1
12.5 TABLET ORAL EVERY 6 HOURS PRN
Status: DISCONTINUED | OUTPATIENT
Start: 2023-01-01 | End: 2023-01-01

## 2023-01-01 RX ORDER — POTASSIUM CHLORIDE 29.8 MG/ML
20 INJECTION INTRAVENOUS PRN
Status: DISCONTINUED | OUTPATIENT
Start: 2023-01-01 | End: 2023-01-01

## 2023-01-01 RX ADMIN — ANTICOAGULANT SODIUM CITRATE SOLUTION: 4 SOLUTION INTRAVENOUS at 21:00

## 2023-01-01 RX ADMIN — LACTULOSE 20 G: 20 SOLUTION ORAL at 15:43

## 2023-01-01 RX ADMIN — ARFORMOTEROL TARTRATE 15 MCG: 15 SOLUTION RESPIRATORY (INHALATION) at 20:20

## 2023-01-01 RX ADMIN — PANTOPRAZOLE SODIUM 40 MG: 40 TABLET, DELAYED RELEASE ORAL at 06:18

## 2023-01-01 RX ADMIN — IPRATROPIUM BROMIDE AND ALBUTEROL SULFATE 3 ML: .5; 2.5 SOLUTION RESPIRATORY (INHALATION) at 20:33

## 2023-01-01 RX ADMIN — LACTULOSE 20 G: 20 SOLUTION ORAL at 21:41

## 2023-01-01 RX ADMIN — GABAPENTIN 200 MG: 100 CAPSULE ORAL at 08:29

## 2023-01-01 RX ADMIN — CHLORHEXIDINE GLUCONATE 0.12% ORAL RINSE 15 ML: 1.2 LIQUID ORAL at 20:55

## 2023-01-01 RX ADMIN — Medication: at 20:08

## 2023-01-01 RX ADMIN — MULTIPLE VITAMINS W/ MINERALS TAB 1 TABLET: TAB at 08:31

## 2023-01-01 RX ADMIN — PETROLATUM: 420 OINTMENT TOPICAL at 08:34

## 2023-01-01 RX ADMIN — BUMETANIDE 2 MG/HR: 0.25 INJECTION INTRAMUSCULAR; INTRAVENOUS at 22:58

## 2023-01-01 RX ADMIN — DEXMEDETOMIDINE 1.5 MCG/KG/HR: 100 INJECTION, SOLUTION INTRAVENOUS at 08:31

## 2023-01-01 RX ADMIN — RIFAXIMIN 550 MG: 550 TABLET ORAL at 21:41

## 2023-01-01 RX ADMIN — ATORVASTATIN CALCIUM 40 MG: 40 TABLET, FILM COATED ORAL at 20:35

## 2023-01-01 RX ADMIN — BUMETANIDE 2 MG: 0.25 INJECTION, SOLUTION INTRAMUSCULAR; INTRAVENOUS at 22:03

## 2023-01-01 RX ADMIN — PANTOPRAZOLE SODIUM 40 MG: 40 INJECTION, POWDER, FOR SOLUTION INTRAVENOUS at 07:32

## 2023-01-01 RX ADMIN — ANTICOAGULANT SODIUM CITRATE SOLUTION: 4 SOLUTION INTRAVENOUS at 00:01

## 2023-01-01 RX ADMIN — BUDESONIDE 500 MCG: 0.5 SUSPENSION RESPIRATORY (INHALATION) at 20:13

## 2023-01-01 RX ADMIN — MIDODRINE HYDROCHLORIDE 10 MG: 10 TABLET ORAL at 17:56

## 2023-01-01 RX ADMIN — Medication 10 ML: at 08:54

## 2023-01-01 RX ADMIN — IPRATROPIUM BROMIDE AND ALBUTEROL SULFATE 3 ML: .5; 2.5 SOLUTION RESPIRATORY (INHALATION) at 13:40

## 2023-01-01 RX ADMIN — BUMETANIDE 1 MG/HR: 0.25 INJECTION INTRAMUSCULAR; INTRAVENOUS at 12:12

## 2023-01-01 RX ADMIN — IPRATROPIUM BROMIDE AND ALBUTEROL SULFATE 3 ML: .5; 2.5 SOLUTION RESPIRATORY (INHALATION) at 08:20

## 2023-01-01 RX ADMIN — BISACODYL 10 MG: 10 SUPPOSITORY RECTAL at 21:19

## 2023-01-01 RX ADMIN — DEXTROSE MONOHYDRATE 125 ML: 100 INJECTION, SOLUTION INTRAVENOUS at 15:18

## 2023-01-01 RX ADMIN — PANTOPRAZOLE SODIUM 40 MG: 40 INJECTION, POWDER, FOR SOLUTION INTRAVENOUS at 09:29

## 2023-01-01 RX ADMIN — PETROLATUM: 420 OINTMENT TOPICAL at 21:41

## 2023-01-01 RX ADMIN — ARFORMOTEROL TARTRATE 15 MCG: 15 SOLUTION RESPIRATORY (INHALATION) at 09:05

## 2023-01-01 RX ADMIN — PETROLATUM: 420 OINTMENT TOPICAL at 08:50

## 2023-01-01 RX ADMIN — CHLORHEXIDINE GLUCONATE 0.12% ORAL RINSE 15 ML: 1.2 LIQUID ORAL at 20:17

## 2023-01-01 RX ADMIN — BISACODYL 10 MG: 10 SUPPOSITORY RECTAL at 20:46

## 2023-01-01 RX ADMIN — Medication 10 ML: at 08:37

## 2023-01-01 RX ADMIN — BUDESONIDE 500 MCG: 0.5 SUSPENSION RESPIRATORY (INHALATION) at 08:30

## 2023-01-01 RX ADMIN — MULTIPLE VITAMINS W/ MINERALS TAB 1 TABLET: TAB at 08:36

## 2023-01-01 RX ADMIN — PANTOPRAZOLE SODIUM 40 MG: 40 INJECTION, POWDER, FOR SOLUTION INTRAVENOUS at 09:21

## 2023-01-01 RX ADMIN — BUDESONIDE 500 MCG: 0.5 SUSPENSION RESPIRATORY (INHALATION) at 08:19

## 2023-01-01 RX ADMIN — PIPERACILLIN AND TAZOBACTAM 4500 MG: 4; .5 INJECTION, POWDER, LYOPHILIZED, FOR SOLUTION INTRAVENOUS at 21:35

## 2023-01-01 RX ADMIN — APIXABAN 2.5 MG: 2.5 TABLET, FILM COATED ORAL at 21:55

## 2023-01-01 RX ADMIN — NYSTATIN 500000 UNITS: 100000 SUSPENSION ORAL at 20:17

## 2023-01-01 RX ADMIN — HYDRALAZINE HYDROCHLORIDE 25 MG: 25 TABLET, FILM COATED ORAL at 13:34

## 2023-01-01 RX ADMIN — Medication 10 ML: at 08:27

## 2023-01-01 RX ADMIN — BISACODYL 10 MG: 10 SUPPOSITORY RECTAL at 20:55

## 2023-01-01 RX ADMIN — DEXMEDETOMIDINE 0.2 MCG/KG/HR: 100 INJECTION, SOLUTION INTRAVENOUS at 10:30

## 2023-01-01 RX ADMIN — LACTULOSE 20 G: 20 SOLUTION ORAL at 08:11

## 2023-01-01 RX ADMIN — CHLORHEXIDINE GLUCONATE 0.12% ORAL RINSE 15 ML: 1.2 LIQUID ORAL at 20:27

## 2023-01-01 RX ADMIN — PETROLATUM: 420 OINTMENT TOPICAL at 07:38

## 2023-01-01 RX ADMIN — PHENYLEPHRINE HYDROCHLORIDE 50 MCG/MIN: 10 INJECTION, SOLUTION INTRAMUSCULAR; INTRAVENOUS; SUBCUTANEOUS at 00:21

## 2023-01-01 RX ADMIN — CHLORHEXIDINE GLUCONATE 0.12% ORAL RINSE 15 ML: 1.2 LIQUID ORAL at 20:42

## 2023-01-01 RX ADMIN — MULTIPLE VITAMINS W/ MINERALS TAB 1 TABLET: TAB at 08:04

## 2023-01-01 RX ADMIN — DEXTROSE AND SODIUM CHLORIDE: 5; 200 INJECTION, SOLUTION INTRAVENOUS at 06:51

## 2023-01-01 RX ADMIN — EPOETIN ALFA-EPBX 3000 UNITS: 3000 INJECTION, SOLUTION INTRAVENOUS; SUBCUTANEOUS at 08:38

## 2023-01-01 RX ADMIN — VALSARTAN 40 MG: 40 TABLET, FILM COATED ORAL at 21:55

## 2023-01-01 RX ADMIN — EPOETIN ALFA-EPBX 3000 UNITS: 3000 INJECTION, SOLUTION INTRAVENOUS; SUBCUTANEOUS at 10:12

## 2023-01-01 RX ADMIN — ANTICOAGULANT SODIUM CITRATE SOLUTION: 4 SOLUTION INTRAVENOUS at 09:43

## 2023-01-01 RX ADMIN — GABAPENTIN 200 MG: 100 CAPSULE ORAL at 14:26

## 2023-01-01 RX ADMIN — MULTIPLE VITAMINS W/ MINERALS TAB 1 TABLET: TAB at 08:46

## 2023-01-01 RX ADMIN — RIFAXIMIN 550 MG: 550 TABLET ORAL at 20:30

## 2023-01-01 RX ADMIN — BUDESONIDE 500 MCG: 0.5 SUSPENSION RESPIRATORY (INHALATION) at 07:34

## 2023-01-01 RX ADMIN — Medication 25 MCG/HR: at 08:37

## 2023-01-01 RX ADMIN — ASPIRIN 81 MG: 81 TABLET, COATED ORAL at 08:53

## 2023-01-01 RX ADMIN — BUDESONIDE 500 MCG: 0.5 SUSPENSION RESPIRATORY (INHALATION) at 09:06

## 2023-01-01 RX ADMIN — GABAPENTIN 200 MG: 100 CAPSULE ORAL at 08:46

## 2023-01-01 RX ADMIN — NYSTATIN 500000 UNITS: 100000 SUSPENSION ORAL at 16:40

## 2023-01-01 RX ADMIN — FENTANYL CITRATE 25 MCG: 50 INJECTION, SOLUTION INTRAMUSCULAR; INTRAVENOUS at 20:45

## 2023-01-01 RX ADMIN — Medication 10 ML: at 09:30

## 2023-01-01 RX ADMIN — Medication 50 MCG/HR: at 06:20

## 2023-01-01 RX ADMIN — NYSTATIN 500000 UNITS: 100000 SUSPENSION ORAL at 21:12

## 2023-01-01 RX ADMIN — APIXABAN 2.5 MG: 2.5 TABLET, FILM COATED ORAL at 08:24

## 2023-01-01 RX ADMIN — MIDODRINE HYDROCHLORIDE 10 MG: 10 TABLET ORAL at 08:49

## 2023-01-01 RX ADMIN — Medication 10 ML: at 20:55

## 2023-01-01 RX ADMIN — ACETAMINOPHEN 650 MG: 325 TABLET ORAL at 09:29

## 2023-01-01 RX ADMIN — NYSTATIN 500000 UNITS: 100000 SUSPENSION ORAL at 07:37

## 2023-01-01 RX ADMIN — DEXTROSE MONOHYDRATE 125 ML: 100 INJECTION, SOLUTION INTRAVENOUS at 00:08

## 2023-01-01 RX ADMIN — SODIUM PHOSPHATE, MONOBASIC, MONOHYDRATE AND SODIUM PHOSPHATE, DIBASIC, ANHYDROUS 12 MMOL: 276; 142 INJECTION, SOLUTION INTRAVENOUS at 14:18

## 2023-01-01 RX ADMIN — NYSTATIN 500000 UNITS: 100000 SUSPENSION ORAL at 12:28

## 2023-01-01 RX ADMIN — CHLORHEXIDINE GLUCONATE 0.12% ORAL RINSE 15 ML: 1.2 LIQUID ORAL at 21:37

## 2023-01-01 RX ADMIN — BUDESONIDE 500 MCG: 0.5 SUSPENSION RESPIRATORY (INHALATION) at 07:59

## 2023-01-01 RX ADMIN — ONDANSETRON 4 MG: 2 INJECTION INTRAMUSCULAR; INTRAVENOUS at 06:50

## 2023-01-01 RX ADMIN — RIFAXIMIN 550 MG: 550 TABLET ORAL at 07:37

## 2023-01-01 RX ADMIN — DEXMEDETOMIDINE 0.4 MCG/KG/HR: 100 INJECTION, SOLUTION INTRAVENOUS at 08:56

## 2023-01-01 RX ADMIN — Medication 10 ML: at 07:32

## 2023-01-01 RX ADMIN — BUDESONIDE 500 MCG: 0.5 SUSPENSION RESPIRATORY (INHALATION) at 07:46

## 2023-01-01 RX ADMIN — Medication 10 ML: at 20:56

## 2023-01-01 RX ADMIN — Medication 5 MCG/MIN: at 09:50

## 2023-01-01 RX ADMIN — Medication: at 04:55

## 2023-01-01 RX ADMIN — VANCOMYCIN HYDROCHLORIDE 2000 MG: 10 INJECTION, POWDER, LYOPHILIZED, FOR SOLUTION INTRAVENOUS at 14:29

## 2023-01-01 RX ADMIN — CHLORHEXIDINE GLUCONATE 0.12% ORAL RINSE 15 ML: 1.2 LIQUID ORAL at 20:40

## 2023-01-01 RX ADMIN — NYSTATIN 500000 UNITS: 100000 SUSPENSION ORAL at 08:22

## 2023-01-01 RX ADMIN — PANTOPRAZOLE SODIUM 40 MG: 40 INJECTION, POWDER, FOR SOLUTION INTRAVENOUS at 21:12

## 2023-01-01 RX ADMIN — HYDROCORTISONE SODIUM SUCCINATE 50 MG: 100 INJECTION, POWDER, FOR SOLUTION INTRAMUSCULAR; INTRAVENOUS at 22:29

## 2023-01-01 RX ADMIN — NYSTATIN 500000 UNITS: 100000 SUSPENSION ORAL at 08:49

## 2023-01-01 RX ADMIN — Medication 6000 UNITS: at 08:11

## 2023-01-01 RX ADMIN — ATORVASTATIN CALCIUM 40 MG: 40 TABLET, FILM COATED ORAL at 20:26

## 2023-01-01 RX ADMIN — ARFORMOTEROL TARTRATE 15 MCG: 15 SOLUTION RESPIRATORY (INHALATION) at 20:13

## 2023-01-01 RX ADMIN — IPRATROPIUM BROMIDE AND ALBUTEROL SULFATE 3 ML: .5; 2.5 SOLUTION RESPIRATORY (INHALATION) at 20:59

## 2023-01-01 RX ADMIN — ARFORMOTEROL TARTRATE 15 MCG: 15 SOLUTION RESPIRATORY (INHALATION) at 19:49

## 2023-01-01 RX ADMIN — MULTIPLE VITAMINS W/ MINERALS TAB 1 TABLET: TAB at 09:21

## 2023-01-01 RX ADMIN — CHLORHEXIDINE GLUCONATE 0.12% ORAL RINSE 15 ML: 1.2 LIQUID ORAL at 08:26

## 2023-01-01 RX ADMIN — BUDESONIDE 500 MCG: 0.5 SUSPENSION RESPIRATORY (INHALATION) at 19:33

## 2023-01-01 RX ADMIN — CALCIUM CHLORIDE INJECTION 8000 MG: 100 INJECTION, SOLUTION INTRAVENOUS at 12:10

## 2023-01-01 RX ADMIN — BUDESONIDE 500 MCG: 0.5 SUSPENSION RESPIRATORY (INHALATION) at 21:00

## 2023-01-01 RX ADMIN — IPRATROPIUM BROMIDE AND ALBUTEROL SULFATE 3 ML: .5; 2.5 SOLUTION RESPIRATORY (INHALATION) at 16:12

## 2023-01-01 RX ADMIN — NYSTATIN 500000 UNITS: 100000 SUSPENSION ORAL at 20:44

## 2023-01-01 RX ADMIN — IPRATROPIUM BROMIDE AND ALBUTEROL SULFATE 3 ML: .5; 2.5 SOLUTION RESPIRATORY (INHALATION) at 12:16

## 2023-01-01 RX ADMIN — IPRATROPIUM BROMIDE AND ALBUTEROL SULFATE 3 ML: 2.5; .5 SOLUTION RESPIRATORY (INHALATION) at 14:09

## 2023-01-01 RX ADMIN — RIFAXIMIN 550 MG: 550 TABLET ORAL at 08:33

## 2023-01-01 RX ADMIN — ASPIRIN 81 MG: 81 TABLET, COATED ORAL at 10:34

## 2023-01-01 RX ADMIN — ACETAMINOPHEN 650 MG: 325 TABLET ORAL at 20:05

## 2023-01-01 RX ADMIN — FENTANYL CITRATE 100 MCG: 50 INJECTION, SOLUTION INTRAMUSCULAR; INTRAVENOUS at 06:00

## 2023-01-01 RX ADMIN — ARFORMOTEROL TARTRATE 15 MCG: 15 SOLUTION RESPIRATORY (INHALATION) at 19:39

## 2023-01-01 RX ADMIN — APIXABAN 2.5 MG: 2.5 TABLET, FILM COATED ORAL at 08:46

## 2023-01-01 RX ADMIN — GABAPENTIN 200 MG: 100 CAPSULE ORAL at 14:42

## 2023-01-01 RX ADMIN — PETROLATUM: 420 OINTMENT TOPICAL at 08:36

## 2023-01-01 RX ADMIN — PIPERACILLIN AND TAZOBACTAM 4500 MG: 4; .5 INJECTION, POWDER, LYOPHILIZED, FOR SOLUTION INTRAVENOUS at 08:08

## 2023-01-01 RX ADMIN — MIDODRINE HYDROCHLORIDE 10 MG: 10 TABLET ORAL at 13:01

## 2023-01-01 RX ADMIN — MIDODRINE HYDROCHLORIDE 10 MG: 10 TABLET ORAL at 12:11

## 2023-01-01 RX ADMIN — PETROLATUM: 420 OINTMENT TOPICAL at 08:56

## 2023-01-01 RX ADMIN — APIXABAN 2.5 MG: 2.5 TABLET, FILM COATED ORAL at 20:53

## 2023-01-01 RX ADMIN — PIPERACILLIN AND TAZOBACTAM 4500 MG: 4; .5 INJECTION, POWDER, LYOPHILIZED, FOR SOLUTION INTRAVENOUS at 20:47

## 2023-01-01 RX ADMIN — Medication: at 05:00

## 2023-01-01 RX ADMIN — ACETAMINOPHEN 650 MG: 325 TABLET ORAL at 20:17

## 2023-01-01 RX ADMIN — ACETAMINOPHEN 650 MG: 325 TABLET ORAL at 20:16

## 2023-01-01 RX ADMIN — NYSTATIN 500000 UNITS: 100000 SUSPENSION ORAL at 12:00

## 2023-01-01 RX ADMIN — EPOETIN ALFA-EPBX 3000 UNITS: 3000 INJECTION, SOLUTION INTRAVENOUS; SUBCUTANEOUS at 09:29

## 2023-01-01 RX ADMIN — LACTULOSE 20 G: 20 SOLUTION ORAL at 20:17

## 2023-01-01 RX ADMIN — CHLORHEXIDINE GLUCONATE 0.12% ORAL RINSE 15 ML: 1.2 LIQUID ORAL at 08:35

## 2023-01-01 RX ADMIN — CHLORHEXIDINE GLUCONATE 0.12% ORAL RINSE 15 ML: 1.2 LIQUID ORAL at 20:57

## 2023-01-01 RX ADMIN — MIDODRINE HYDROCHLORIDE 10 MG: 10 TABLET ORAL at 08:11

## 2023-01-01 RX ADMIN — Medication: at 00:00

## 2023-01-01 RX ADMIN — IPRATROPIUM BROMIDE AND ALBUTEROL SULFATE 3 ML: .5; 2.5 SOLUTION RESPIRATORY (INHALATION) at 12:55

## 2023-01-01 RX ADMIN — EPOETIN ALFA-EPBX 3000 UNITS: 3000 INJECTION, SOLUTION INTRAVENOUS; SUBCUTANEOUS at 09:33

## 2023-01-01 RX ADMIN — POTASSIUM BICARBONATE 40 MEQ: 782 TABLET, EFFERVESCENT ORAL at 08:25

## 2023-01-01 RX ADMIN — PIPERACILLIN AND TAZOBACTAM 4500 MG: 4; .5 INJECTION, POWDER, LYOPHILIZED, FOR SOLUTION INTRAVENOUS at 08:40

## 2023-01-01 RX ADMIN — LACTULOSE 20 G: 20 SOLUTION ORAL at 13:17

## 2023-01-01 RX ADMIN — Medication 10 ML: at 08:47

## 2023-01-01 RX ADMIN — NYSTATIN 500000 UNITS: 100000 SUSPENSION ORAL at 20:12

## 2023-01-01 RX ADMIN — GUAIFENESIN 200 MG: 200 SOLUTION ORAL at 20:17

## 2023-01-01 RX ADMIN — NYSTATIN 500000 UNITS: 100000 SUSPENSION ORAL at 19:02

## 2023-01-01 RX ADMIN — APIXABAN 2.5 MG: 2.5 TABLET, FILM COATED ORAL at 08:40

## 2023-01-01 RX ADMIN — BUDESONIDE 500 MCG: 0.5 SUSPENSION RESPIRATORY (INHALATION) at 19:54

## 2023-01-01 RX ADMIN — DEXMEDETOMIDINE 1.2 MCG/KG/HR: 100 INJECTION, SOLUTION INTRAVENOUS at 12:32

## 2023-01-01 RX ADMIN — BUDESONIDE 500 MCG: 0.5 SUSPENSION RESPIRATORY (INHALATION) at 08:12

## 2023-01-01 RX ADMIN — IPRATROPIUM BROMIDE AND ALBUTEROL SULFATE 3 ML: .5; 2.5 SOLUTION RESPIRATORY (INHALATION) at 19:23

## 2023-01-01 RX ADMIN — BUMETANIDE 2 MG/HR: 0.25 INJECTION INTRAMUSCULAR; INTRAVENOUS at 19:18

## 2023-01-01 RX ADMIN — PIPERACILLIN AND TAZOBACTAM 4500 MG: 4; .5 INJECTION, POWDER, LYOPHILIZED, FOR SOLUTION INTRAVENOUS at 16:06

## 2023-01-01 RX ADMIN — SODIUM PHOSPHATE, MONOBASIC, MONOHYDRATE AND SODIUM PHOSPHATE, DIBASIC, ANHYDROUS 6 MMOL: 276; 142 INJECTION, SOLUTION INTRAVENOUS at 03:13

## 2023-01-01 RX ADMIN — GABAPENTIN 200 MG: 100 CAPSULE ORAL at 08:04

## 2023-01-01 RX ADMIN — CHLORHEXIDINE GLUCONATE 0.12% ORAL RINSE 15 ML: 1.2 LIQUID ORAL at 20:06

## 2023-01-01 RX ADMIN — BUMETANIDE 2 MG/HR: 0.25 INJECTION INTRAMUSCULAR; INTRAVENOUS at 20:20

## 2023-01-01 RX ADMIN — RIFAXIMIN 550 MG: 550 TABLET ORAL at 08:49

## 2023-01-01 RX ADMIN — GABAPENTIN 200 MG: 100 CAPSULE ORAL at 21:12

## 2023-01-01 RX ADMIN — APIXABAN 2.5 MG: 2.5 TABLET, FILM COATED ORAL at 08:38

## 2023-01-01 RX ADMIN — GABAPENTIN 200 MG: 100 CAPSULE ORAL at 11:12

## 2023-01-01 RX ADMIN — PANTOPRAZOLE SODIUM 40 MG: 40 INJECTION, POWDER, FOR SOLUTION INTRAVENOUS at 20:16

## 2023-01-01 RX ADMIN — CHLORHEXIDINE GLUCONATE 0.12% ORAL RINSE 15 ML: 1.2 LIQUID ORAL at 09:29

## 2023-01-01 RX ADMIN — PIPERACILLIN AND TAZOBACTAM 4500 MG: 4; .5 INJECTION, POWDER, LYOPHILIZED, FOR SOLUTION INTRAVENOUS at 22:02

## 2023-01-01 RX ADMIN — ARFORMOTEROL TARTRATE 15 MCG: 15 SOLUTION RESPIRATORY (INHALATION) at 19:37

## 2023-01-01 RX ADMIN — DEXMEDETOMIDINE 1.5 MCG/KG/HR: 100 INJECTION, SOLUTION INTRAVENOUS at 14:42

## 2023-01-01 RX ADMIN — TAMSULOSIN HYDROCHLORIDE 0.4 MG: 0.4 CAPSULE ORAL at 08:37

## 2023-01-01 RX ADMIN — LACTULOSE 20 G: 20 SOLUTION ORAL at 09:21

## 2023-01-01 RX ADMIN — CALCIUM GLUCONATE 1000 MG: 10 INJECTION, SOLUTION INTRAVENOUS at 06:45

## 2023-01-01 RX ADMIN — MIDODRINE HYDROCHLORIDE 10 MG: 10 TABLET ORAL at 11:55

## 2023-01-01 RX ADMIN — ISOSORBIDE MONONITRATE 30 MG: 30 TABLET, EXTENDED RELEASE ORAL at 10:34

## 2023-01-01 RX ADMIN — GABAPENTIN 200 MG: 100 CAPSULE ORAL at 15:28

## 2023-01-01 RX ADMIN — ASPIRIN 81 MG 81 MG: 81 TABLET ORAL at 09:41

## 2023-01-01 RX ADMIN — LACTULOSE 20 G: 20 SOLUTION ORAL at 12:43

## 2023-01-01 RX ADMIN — CHLORHEXIDINE GLUCONATE 0.12% ORAL RINSE 15 ML: 1.2 LIQUID ORAL at 20:45

## 2023-01-01 RX ADMIN — GABAPENTIN 200 MG: 100 CAPSULE ORAL at 08:30

## 2023-01-01 RX ADMIN — LEVETIRACETAM 250 MG: 100 INJECTION INTRAVENOUS at 08:56

## 2023-01-01 RX ADMIN — RIFAXIMIN 550 MG: 550 TABLET ORAL at 20:57

## 2023-01-01 RX ADMIN — PIPERACILLIN AND TAZOBACTAM 4500 MG: 4; .5 INJECTION, POWDER, LYOPHILIZED, FOR SOLUTION INTRAVENOUS at 12:48

## 2023-01-01 RX ADMIN — ARFORMOTEROL TARTRATE 15 MCG: 15 SOLUTION RESPIRATORY (INHALATION) at 19:59

## 2023-01-01 RX ADMIN — CHLORHEXIDINE GLUCONATE 0.12% ORAL RINSE 15 ML: 1.2 LIQUID ORAL at 21:29

## 2023-01-01 RX ADMIN — POLYETHYLENE GLYCOL 3350 17 G: 17 POWDER, FOR SOLUTION ORAL at 09:22

## 2023-01-01 RX ADMIN — ANTICOAGULANT SODIUM CITRATE SOLUTION: 4 SOLUTION INTRAVENOUS at 06:22

## 2023-01-01 RX ADMIN — FENTANYL CITRATE 25 MCG: 50 INJECTION, SOLUTION INTRAMUSCULAR; INTRAVENOUS at 02:58

## 2023-01-01 RX ADMIN — NYSTATIN 500000 UNITS: 100000 SUSPENSION ORAL at 20:16

## 2023-01-01 RX ADMIN — GABAPENTIN 200 MG: 100 CAPSULE ORAL at 20:40

## 2023-01-01 RX ADMIN — POLYETHYLENE GLYCOL 3350 17 G: 17 POWDER, FOR SOLUTION ORAL at 20:27

## 2023-01-01 RX ADMIN — IPRATROPIUM BROMIDE AND ALBUTEROL SULFATE 3 ML: 2.5; .5 SOLUTION RESPIRATORY (INHALATION) at 07:56

## 2023-01-01 RX ADMIN — GUAIFENESIN 200 MG: 200 SOLUTION ORAL at 20:44

## 2023-01-01 RX ADMIN — Medication 10 ML: at 22:04

## 2023-01-01 RX ADMIN — GUAIFENESIN 200 MG: 200 SOLUTION ORAL at 20:16

## 2023-01-01 RX ADMIN — Medication 10 ML: at 08:11

## 2023-01-01 RX ADMIN — MIDODRINE HYDROCHLORIDE 10 MG: 10 TABLET ORAL at 17:15

## 2023-01-01 RX ADMIN — NYSTATIN 500000 UNITS: 100000 SUSPENSION ORAL at 08:32

## 2023-01-01 RX ADMIN — MULTIPLE VITAMINS W/ MINERALS TAB 1 TABLET: TAB at 11:11

## 2023-01-01 RX ADMIN — NYSTATIN 500000 UNITS: 100000 SUSPENSION ORAL at 12:10

## 2023-01-01 RX ADMIN — BISACODYL 10 MG: 10 SUPPOSITORY RECTAL at 09:00

## 2023-01-01 RX ADMIN — INSULIN LISPRO 1 UNITS: 100 INJECTION, SOLUTION INTRAVENOUS; SUBCUTANEOUS at 01:00

## 2023-01-01 RX ADMIN — POLYETHYLENE GLYCOL 3350 17 G: 17 POWDER, FOR SOLUTION ORAL at 20:16

## 2023-01-01 RX ADMIN — BUDESONIDE 500 MCG: 0.5 SUSPENSION RESPIRATORY (INHALATION) at 19:49

## 2023-01-01 RX ADMIN — CHLORHEXIDINE GLUCONATE 0.12% ORAL RINSE 15 ML: 1.2 LIQUID ORAL at 19:52

## 2023-01-01 RX ADMIN — PETROLATUM: 420 OINTMENT TOPICAL at 09:22

## 2023-01-01 RX ADMIN — Medication: at 21:00

## 2023-01-01 RX ADMIN — PANTOPRAZOLE SODIUM 40 MG: 40 INJECTION, POWDER, FOR SOLUTION INTRAVENOUS at 07:37

## 2023-01-01 RX ADMIN — EPOETIN ALFA-EPBX 3000 UNITS: 3000 INJECTION, SOLUTION INTRAVENOUS; SUBCUTANEOUS at 10:00

## 2023-01-01 RX ADMIN — Medication 25 MCG/HR: at 11:45

## 2023-01-01 RX ADMIN — BUDESONIDE 500 MCG: 0.5 SUSPENSION RESPIRATORY (INHALATION) at 20:20

## 2023-01-01 RX ADMIN — LACTULOSE 20 G: 20 SOLUTION ORAL at 21:29

## 2023-01-01 RX ADMIN — ASPIRIN 81 MG: 81 TABLET, COATED ORAL at 08:24

## 2023-01-01 RX ADMIN — HYDRALAZINE HYDROCHLORIDE 25 MG: 25 TABLET, FILM COATED ORAL at 22:05

## 2023-01-01 RX ADMIN — ARFORMOTEROL TARTRATE 15 MCG: 15 SOLUTION RESPIRATORY (INHALATION) at 12:26

## 2023-01-01 RX ADMIN — IPRATROPIUM BROMIDE AND ALBUTEROL SULFATE 3 ML: .5; 2.5 SOLUTION RESPIRATORY (INHALATION) at 11:48

## 2023-01-01 RX ADMIN — PETROLATUM: 420 OINTMENT TOPICAL at 22:20

## 2023-01-01 RX ADMIN — PETROLATUM: 420 OINTMENT TOPICAL at 21:24

## 2023-01-01 RX ADMIN — FENTANYL CITRATE 25 MCG: 50 INJECTION, SOLUTION INTRAMUSCULAR; INTRAVENOUS at 12:40

## 2023-01-01 RX ADMIN — Medication 6000 UNITS: at 07:32

## 2023-01-01 RX ADMIN — PETROLATUM: 420 OINTMENT TOPICAL at 20:13

## 2023-01-01 RX ADMIN — LACTULOSE 20 G: 20 SOLUTION ORAL at 08:25

## 2023-01-01 RX ADMIN — Medication: at 11:07

## 2023-01-01 RX ADMIN — DOXAZOSIN 2 MG: 2 TABLET ORAL at 08:32

## 2023-01-01 RX ADMIN — MIDODRINE HYDROCHLORIDE 10 MG: 10 TABLET ORAL at 17:36

## 2023-01-01 RX ADMIN — METOPROLOL TARTRATE 5 MG: 1 INJECTION, SOLUTION INTRAVENOUS at 21:43

## 2023-01-01 RX ADMIN — CALCIUM CHLORIDE INJECTION 8000 MG: 100 INJECTION, SOLUTION INTRAVENOUS at 21:44

## 2023-01-01 RX ADMIN — IPRATROPIUM BROMIDE AND ALBUTEROL SULFATE 3 ML: .5; 2.5 SOLUTION RESPIRATORY (INHALATION) at 07:46

## 2023-01-01 RX ADMIN — DIPHENHYDRAMINE HYDROCHLORIDE 50 MG: 50 INJECTION, SOLUTION INTRAMUSCULAR; INTRAVENOUS at 12:20

## 2023-01-01 RX ADMIN — ACETAMINOPHEN 650 MG: 325 TABLET ORAL at 12:09

## 2023-01-01 RX ADMIN — POLYETHYLENE GLYCOL 3350 17 G: 17 POWDER, FOR SOLUTION ORAL at 08:33

## 2023-01-01 RX ADMIN — PHENYLEPHRINE HYDROCHLORIDE 125 MCG/MIN: 10 INJECTION, SOLUTION INTRAMUSCULAR; INTRAVENOUS; SUBCUTANEOUS at 06:58

## 2023-01-01 RX ADMIN — GABAPENTIN 200 MG: 100 CAPSULE ORAL at 08:53

## 2023-01-01 RX ADMIN — SENNOSIDES 15 ML: 8.8 SYRUP ORAL at 09:22

## 2023-01-01 RX ADMIN — Medication 50 MCG/HR: at 11:22

## 2023-01-01 RX ADMIN — FUROSEMIDE 40 MG: 10 INJECTION, SOLUTION INTRAMUSCULAR; INTRAVENOUS at 13:01

## 2023-01-01 RX ADMIN — Medication: at 09:56

## 2023-01-01 RX ADMIN — CHLORHEXIDINE GLUCONATE 0.12% ORAL RINSE 15 ML: 1.2 LIQUID ORAL at 08:32

## 2023-01-01 RX ADMIN — GLYCOPYRROLATE 0.2 MG: 0.2 INJECTION INTRAMUSCULAR; INTRAVENOUS at 14:22

## 2023-01-01 RX ADMIN — NYSTATIN 500000 UNITS: 100000 SUSPENSION ORAL at 20:27

## 2023-01-01 RX ADMIN — NYSTATIN 500000 UNITS: 100000 SUSPENSION ORAL at 21:38

## 2023-01-01 RX ADMIN — ROCURONIUM BROMIDE 100 MG: 10 INJECTION, SOLUTION INTRAVENOUS at 12:24

## 2023-01-01 RX ADMIN — ISOSORBIDE MONONITRATE 30 MG: 30 TABLET, EXTENDED RELEASE ORAL at 08:37

## 2023-01-01 RX ADMIN — VANCOMYCIN HYDROCHLORIDE 2000 MG: 10 INJECTION, POWDER, LYOPHILIZED, FOR SOLUTION INTRAVENOUS at 18:17

## 2023-01-01 RX ADMIN — APIXABAN 2.5 MG: 2.5 TABLET, FILM COATED ORAL at 08:31

## 2023-01-01 RX ADMIN — DEXMEDETOMIDINE 1.5 MCG/KG/HR: 100 INJECTION, SOLUTION INTRAVENOUS at 07:31

## 2023-01-01 RX ADMIN — IPRATROPIUM BROMIDE AND ALBUTEROL SULFATE 3 ML: .5; 2.5 SOLUTION RESPIRATORY (INHALATION) at 08:47

## 2023-01-01 RX ADMIN — POLYETHYLENE GLYCOL 3350 17 G: 17 POWDER, FOR SOLUTION ORAL at 09:43

## 2023-01-01 RX ADMIN — DEXMEDETOMIDINE 1.5 MCG/KG/HR: 100 INJECTION, SOLUTION INTRAVENOUS at 22:21

## 2023-01-01 RX ADMIN — PETROLATUM: 420 OINTMENT TOPICAL at 08:05

## 2023-01-01 RX ADMIN — FENTANYL CITRATE 50 MCG: 50 INJECTION, SOLUTION INTRAMUSCULAR; INTRAVENOUS at 08:27

## 2023-01-01 RX ADMIN — ACETAMINOPHEN 650 MG: 325 TABLET ORAL at 20:45

## 2023-01-01 RX ADMIN — PETROLATUM: 420 OINTMENT TOPICAL at 08:47

## 2023-01-01 RX ADMIN — ARFORMOTEROL TARTRATE 15 MCG: 15 SOLUTION RESPIRATORY (INHALATION) at 08:30

## 2023-01-01 RX ADMIN — BUDESONIDE 500 MCG: 0.5 SUSPENSION RESPIRATORY (INHALATION) at 19:37

## 2023-01-01 RX ADMIN — BUMETANIDE 2 MG/HR: 0.25 INJECTION INTRAMUSCULAR; INTRAVENOUS at 13:07

## 2023-01-01 RX ADMIN — NYSTATIN 500000 UNITS: 100000 SUSPENSION ORAL at 20:55

## 2023-01-01 RX ADMIN — NYSTATIN 500000 UNITS: 100000 SUSPENSION ORAL at 16:21

## 2023-01-01 RX ADMIN — GABAPENTIN 200 MG: 100 CAPSULE ORAL at 14:57

## 2023-01-01 RX ADMIN — ANTICOAGULANT SODIUM CITRATE SOLUTION: 4 SOLUTION INTRAVENOUS at 03:32

## 2023-01-01 RX ADMIN — RIFAXIMIN 550 MG: 550 TABLET ORAL at 08:35

## 2023-01-01 RX ADMIN — PANTOPRAZOLE SODIUM 40 MG: 40 INJECTION, POWDER, FOR SOLUTION INTRAVENOUS at 20:55

## 2023-01-01 RX ADMIN — BUDESONIDE 500 MCG: 0.5 SUSPENSION RESPIRATORY (INHALATION) at 08:22

## 2023-01-01 RX ADMIN — Medication 10 ML: at 10:35

## 2023-01-01 RX ADMIN — NYSTATIN 500000 UNITS: 100000 SUSPENSION ORAL at 08:07

## 2023-01-01 RX ADMIN — LACTULOSE 20 G: 20 SOLUTION ORAL at 12:50

## 2023-01-01 RX ADMIN — NYSTATIN 500000 UNITS: 100000 SUSPENSION ORAL at 00:29

## 2023-01-01 RX ADMIN — LACTULOSE 20 G: 20 SOLUTION ORAL at 08:35

## 2023-01-01 RX ADMIN — PIPERACILLIN AND TAZOBACTAM 4500 MG: 4; .5 INJECTION, POWDER, LYOPHILIZED, FOR SOLUTION INTRAVENOUS at 23:16

## 2023-01-01 RX ADMIN — BISACODYL 10 MG: 10 SUPPOSITORY RECTAL at 07:35

## 2023-01-01 RX ADMIN — ASPIRIN 81 MG 81 MG: 81 TABLET ORAL at 15:28

## 2023-01-01 RX ADMIN — HYDROCORTISONE SODIUM SUCCINATE 50 MG: 100 INJECTION, POWDER, FOR SOLUTION INTRAMUSCULAR; INTRAVENOUS at 18:36

## 2023-01-01 RX ADMIN — GABAPENTIN 200 MG: 100 CAPSULE ORAL at 22:14

## 2023-01-01 RX ADMIN — ARFORMOTEROL TARTRATE 15 MCG: 15 SOLUTION RESPIRATORY (INHALATION) at 10:16

## 2023-01-01 RX ADMIN — PIPERACILLIN AND TAZOBACTAM 4500 MG: 4; .5 INJECTION, POWDER, LYOPHILIZED, FOR SOLUTION INTRAVENOUS at 06:52

## 2023-01-01 RX ADMIN — ARFORMOTEROL TARTRATE 15 MCG: 15 SOLUTION RESPIRATORY (INHALATION) at 08:12

## 2023-01-01 RX ADMIN — PETROLATUM: 420 OINTMENT TOPICAL at 10:12

## 2023-01-01 RX ADMIN — MIDODRINE HYDROCHLORIDE 10 MG: 10 TABLET ORAL at 09:21

## 2023-01-01 RX ADMIN — GABAPENTIN 200 MG: 100 CAPSULE ORAL at 15:53

## 2023-01-01 RX ADMIN — CALCIUM GLUCONATE 1000 MG: 10 INJECTION, SOLUTION INTRAVENOUS at 11:01

## 2023-01-01 RX ADMIN — MINERAL OIL, WHITE PETROLATUM: .03; .94 OINTMENT OPHTHALMIC at 20:24

## 2023-01-01 RX ADMIN — GABAPENTIN 200 MG: 100 CAPSULE ORAL at 20:26

## 2023-01-01 RX ADMIN — PANTOPRAZOLE SODIUM 40 MG: 40 INJECTION, POWDER, FOR SOLUTION INTRAVENOUS at 08:30

## 2023-01-01 RX ADMIN — PETROLATUM: 420 OINTMENT TOPICAL at 20:56

## 2023-01-01 RX ADMIN — POTASSIUM BICARBONATE 40 MEQ: 782 TABLET, EFFERVESCENT ORAL at 08:31

## 2023-01-01 RX ADMIN — VALSARTAN 40 MG: 40 TABLET, FILM COATED ORAL at 09:29

## 2023-01-01 RX ADMIN — CHLORHEXIDINE GLUCONATE 0.12% ORAL RINSE 15 ML: 1.2 LIQUID ORAL at 11:27

## 2023-01-01 RX ADMIN — CALCIUM CHLORIDE INJECTION 8000 MG: 100 INJECTION, SOLUTION INTRAVENOUS at 00:42

## 2023-01-01 RX ADMIN — PANTOPRAZOLE SODIUM 40 MG: 40 INJECTION, POWDER, FOR SOLUTION INTRAVENOUS at 08:31

## 2023-01-01 RX ADMIN — Medication 10 ML: at 08:38

## 2023-01-01 RX ADMIN — DEXTROSE MONOHYDRATE 125 ML: 100 INJECTION, SOLUTION INTRAVENOUS at 12:43

## 2023-01-01 RX ADMIN — APIXABAN 2.5 MG: 2.5 TABLET, FILM COATED ORAL at 21:08

## 2023-01-01 RX ADMIN — DEXMEDETOMIDINE 1.5 MCG/KG/HR: 100 INJECTION, SOLUTION INTRAVENOUS at 07:36

## 2023-01-01 RX ADMIN — BUDESONIDE 500 MCG: 0.5 SUSPENSION RESPIRATORY (INHALATION) at 19:46

## 2023-01-01 RX ADMIN — IPRATROPIUM BROMIDE AND ALBUTEROL SULFATE 3 ML: .5; 2.5 SOLUTION RESPIRATORY (INHALATION) at 13:19

## 2023-01-01 RX ADMIN — INSULIN GLARGINE-YFGN 25 UNITS: 100 INJECTION, SOLUTION SUBCUTANEOUS at 10:45

## 2023-01-01 RX ADMIN — PHENYLEPHRINE HYDROCHLORIDE 55 MCG/MIN: 10 INJECTION, SOLUTION INTRAMUSCULAR; INTRAVENOUS; SUBCUTANEOUS at 12:20

## 2023-01-01 RX ADMIN — Medication 50 MCG/HR: at 13:01

## 2023-01-01 RX ADMIN — ARFORMOTEROL TARTRATE 15 MCG: 15 SOLUTION RESPIRATORY (INHALATION) at 07:34

## 2023-01-01 RX ADMIN — METOPROLOL TARTRATE 2.5 MG: 1 INJECTION, SOLUTION INTRAVENOUS at 00:15

## 2023-01-01 RX ADMIN — GABAPENTIN 200 MG: 100 CAPSULE ORAL at 08:40

## 2023-01-01 RX ADMIN — CHLORHEXIDINE GLUCONATE 0.12% ORAL RINSE 15 ML: 1.2 LIQUID ORAL at 08:30

## 2023-01-01 RX ADMIN — IPRATROPIUM BROMIDE AND ALBUTEROL SULFATE 3 ML: 2.5; .5 SOLUTION RESPIRATORY (INHALATION) at 12:59

## 2023-01-01 RX ADMIN — PETROLATUM: 420 OINTMENT TOPICAL at 09:00

## 2023-01-01 RX ADMIN — PANTOPRAZOLE SODIUM 40 MG: 40 TABLET, DELAYED RELEASE ORAL at 06:30

## 2023-01-01 RX ADMIN — NYSTATIN 500000 UNITS: 100000 SUSPENSION ORAL at 16:11

## 2023-01-01 RX ADMIN — ARFORMOTEROL TARTRATE 15 MCG: 15 SOLUTION RESPIRATORY (INHALATION) at 08:05

## 2023-01-01 RX ADMIN — LACTULOSE 20 G: 20 SOLUTION ORAL at 20:35

## 2023-01-01 RX ADMIN — CHLORHEXIDINE GLUCONATE 0.12% ORAL RINSE 15 ML: 1.2 LIQUID ORAL at 21:12

## 2023-01-01 RX ADMIN — ISOSORBIDE MONONITRATE 30 MG: 30 TABLET, EXTENDED RELEASE ORAL at 08:53

## 2023-01-01 RX ADMIN — POLYETHYLENE GLYCOL 3350 17 G: 17 POWDER, FOR SOLUTION ORAL at 20:57

## 2023-01-01 RX ADMIN — LACTULOSE 20 G: 20 SOLUTION ORAL at 21:18

## 2023-01-01 RX ADMIN — PANTOPRAZOLE SODIUM 40 MG: 40 TABLET, DELAYED RELEASE ORAL at 05:48

## 2023-01-01 RX ADMIN — EPOETIN ALFA-EPBX 3000 UNITS: 3000 INJECTION, SOLUTION INTRAVENOUS; SUBCUTANEOUS at 08:09

## 2023-01-01 RX ADMIN — CHLORHEXIDINE GLUCONATE 0.12% ORAL RINSE 15 ML: 1.2 LIQUID ORAL at 08:54

## 2023-01-01 RX ADMIN — TAMSULOSIN HYDROCHLORIDE 0.4 MG: 0.4 CAPSULE ORAL at 10:34

## 2023-01-01 RX ADMIN — IPRATROPIUM BROMIDE AND ALBUTEROL SULFATE 3 ML: .5; 2.5 SOLUTION RESPIRATORY (INHALATION) at 12:21

## 2023-01-01 RX ADMIN — ANTICOAGULANT SODIUM CITRATE SOLUTION: 4 SOLUTION INTRAVENOUS at 15:38

## 2023-01-01 RX ADMIN — MIDAZOLAM 4 MG: 1 INJECTION INTRAMUSCULAR; INTRAVENOUS at 10:10

## 2023-01-01 RX ADMIN — ARFORMOTEROL TARTRATE 15 MCG: 15 SOLUTION RESPIRATORY (INHALATION) at 08:22

## 2023-01-01 RX ADMIN — PIPERACILLIN AND TAZOBACTAM 4500 MG: 4; .5 INJECTION, POWDER, LYOPHILIZED, FOR SOLUTION INTRAVENOUS at 06:47

## 2023-01-01 RX ADMIN — APIXABAN 2.5 MG: 2.5 TABLET, FILM COATED ORAL at 22:14

## 2023-01-01 RX ADMIN — EMPAGLIFLOZIN 10 MG: 10 TABLET, FILM COATED ORAL at 06:19

## 2023-01-01 RX ADMIN — ARFORMOTEROL TARTRATE 15 MCG: 15 SOLUTION RESPIRATORY (INHALATION) at 19:54

## 2023-01-01 RX ADMIN — METOPROLOL SUCCINATE 50 MG: 50 TABLET, EXTENDED RELEASE ORAL at 08:40

## 2023-01-01 RX ADMIN — PETROLATUM: 420 OINTMENT TOPICAL at 08:26

## 2023-01-01 RX ADMIN — RIFAXIMIN 550 MG: 550 TABLET ORAL at 08:29

## 2023-01-01 RX ADMIN — GABAPENTIN 200 MG: 100 CAPSULE ORAL at 21:20

## 2023-01-01 RX ADMIN — PETROLATUM: 420 OINTMENT TOPICAL at 21:17

## 2023-01-01 RX ADMIN — ARFORMOTEROL TARTRATE 15 MCG: 15 SOLUTION RESPIRATORY (INHALATION) at 19:46

## 2023-01-01 RX ADMIN — PETROLATUM: 420 OINTMENT TOPICAL at 20:19

## 2023-01-01 RX ADMIN — PIPERACILLIN AND TAZOBACTAM 4500 MG: 4; .5 INJECTION, POWDER, LYOPHILIZED, FOR SOLUTION INTRAVENOUS at 20:18

## 2023-01-01 RX ADMIN — PANTOPRAZOLE SODIUM 40 MG: 40 TABLET, DELAYED RELEASE ORAL at 05:46

## 2023-01-01 RX ADMIN — APIXABAN 2.5 MG: 2.5 TABLET, FILM COATED ORAL at 08:04

## 2023-01-01 RX ADMIN — LACTULOSE 20 G: 20 SOLUTION ORAL at 14:55

## 2023-01-01 RX ADMIN — MIDODRINE HYDROCHLORIDE 10 MG: 10 TABLET ORAL at 12:09

## 2023-01-01 RX ADMIN — PETROLATUM: 420 OINTMENT TOPICAL at 10:34

## 2023-01-01 RX ADMIN — MORPHINE SULFATE 4 MG: 4 INJECTION, SOLUTION INTRAMUSCULAR; INTRAVENOUS at 15:10

## 2023-01-01 RX ADMIN — ANTICOAGULANT SODIUM CITRATE SOLUTION: 4 SOLUTION INTRAVENOUS at 12:45

## 2023-01-01 RX ADMIN — PANTOPRAZOLE SODIUM 40 MG: 40 INJECTION, POWDER, FOR SOLUTION INTRAVENOUS at 20:27

## 2023-01-01 RX ADMIN — CHLORHEXIDINE GLUCONATE 0.12% ORAL RINSE 15 ML: 1.2 LIQUID ORAL at 08:46

## 2023-01-01 RX ADMIN — ARFORMOTEROL TARTRATE 15 MCG: 15 SOLUTION RESPIRATORY (INHALATION) at 20:07

## 2023-01-01 RX ADMIN — RIFAXIMIN 550 MG: 550 TABLET ORAL at 09:29

## 2023-01-01 RX ADMIN — ANTICOAGULANT SODIUM CITRATE SOLUTION: 4 SOLUTION INTRAVENOUS at 01:10

## 2023-01-01 RX ADMIN — ARFORMOTEROL TARTRATE 15 MCG: 15 SOLUTION RESPIRATORY (INHALATION) at 19:58

## 2023-01-01 RX ADMIN — PHENYLEPHRINE HYDROCHLORIDE 120 MCG/MIN: 10 INJECTION, SOLUTION INTRAMUSCULAR; INTRAVENOUS; SUBCUTANEOUS at 21:27

## 2023-01-01 RX ADMIN — ARFORMOTEROL TARTRATE 15 MCG: 15 SOLUTION RESPIRATORY (INHALATION) at 21:00

## 2023-01-01 RX ADMIN — ARFORMOTEROL TARTRATE 15 MCG: 15 SOLUTION RESPIRATORY (INHALATION) at 19:33

## 2023-01-01 RX ADMIN — PIPERACILLIN AND TAZOBACTAM 4500 MG: 4; .5 INJECTION, POWDER, LYOPHILIZED, FOR SOLUTION INTRAVENOUS at 08:59

## 2023-01-01 RX ADMIN — RIFAXIMIN 550 MG: 550 TABLET ORAL at 15:59

## 2023-01-01 RX ADMIN — NYSTATIN 500000 UNITS: 100000 SUSPENSION ORAL at 21:19

## 2023-01-01 RX ADMIN — IPRATROPIUM BROMIDE AND ALBUTEROL SULFATE 3 ML: .5; 2.5 SOLUTION RESPIRATORY (INHALATION) at 19:39

## 2023-01-01 RX ADMIN — CALCIUM GLUCONATE 1000 MG: 10 INJECTION, SOLUTION INTRAVENOUS at 02:25

## 2023-01-01 RX ADMIN — LACTULOSE 20 G: 20 SOLUTION ORAL at 20:44

## 2023-01-01 RX ADMIN — PIPERACILLIN AND TAZOBACTAM 4500 MG: 4; .5 INJECTION, POWDER, LYOPHILIZED, FOR SOLUTION INTRAVENOUS at 11:29

## 2023-01-01 RX ADMIN — POLYETHYLENE GLYCOL 3350 17 G: 17 POWDER, FOR SOLUTION ORAL at 09:30

## 2023-01-01 RX ADMIN — PETROLATUM: 420 OINTMENT TOPICAL at 20:41

## 2023-01-01 RX ADMIN — IPRATROPIUM BROMIDE AND ALBUTEROL SULFATE 3 ML: .5; 2.5 SOLUTION RESPIRATORY (INHALATION) at 12:38

## 2023-01-01 RX ADMIN — PETROLATUM: 420 OINTMENT TOPICAL at 08:39

## 2023-01-01 RX ADMIN — BUDESONIDE 500 MCG: 0.5 SUSPENSION RESPIRATORY (INHALATION) at 19:58

## 2023-01-01 RX ADMIN — GUAIFENESIN 200 MG: 200 SOLUTION ORAL at 21:38

## 2023-01-01 RX ADMIN — GABAPENTIN 200 MG: 100 CAPSULE ORAL at 14:30

## 2023-01-01 RX ADMIN — GABAPENTIN 200 MG: 100 CAPSULE ORAL at 08:25

## 2023-01-01 RX ADMIN — ARFORMOTEROL TARTRATE 15 MCG: 15 SOLUTION RESPIRATORY (INHALATION) at 07:54

## 2023-01-01 RX ADMIN — Medication: at 18:18

## 2023-01-01 RX ADMIN — POLYETHYLENE GLYCOL 3350 17 G: 17 POWDER, FOR SOLUTION ORAL at 07:37

## 2023-01-01 RX ADMIN — MIDODRINE HYDROCHLORIDE 10 MG: 10 TABLET ORAL at 16:40

## 2023-01-01 RX ADMIN — NYSTATIN 500000 UNITS: 100000 SUSPENSION ORAL at 08:40

## 2023-01-01 RX ADMIN — GABAPENTIN 200 MG: 100 CAPSULE ORAL at 20:46

## 2023-01-01 RX ADMIN — PHENYLEPHRINE HYDROCHLORIDE 110 MCG/MIN: 10 INJECTION, SOLUTION INTRAMUSCULAR; INTRAVENOUS; SUBCUTANEOUS at 16:03

## 2023-01-01 RX ADMIN — CHLORHEXIDINE GLUCONATE 0.12% ORAL RINSE 15 ML: 1.2 LIQUID ORAL at 21:21

## 2023-01-01 RX ADMIN — NYSTATIN 500000 UNITS: 100000 SUSPENSION ORAL at 12:48

## 2023-01-01 RX ADMIN — LACTULOSE 20 G: 20 SOLUTION ORAL at 08:03

## 2023-01-01 RX ADMIN — PANTOPRAZOLE SODIUM 40 MG: 40 INJECTION, POWDER, FOR SOLUTION INTRAVENOUS at 08:24

## 2023-01-01 RX ADMIN — RIFAXIMIN 550 MG: 550 TABLET ORAL at 11:35

## 2023-01-01 RX ADMIN — LACTULOSE 20 G: 20 SOLUTION ORAL at 20:57

## 2023-01-01 RX ADMIN — HYDRALAZINE HYDROCHLORIDE 25 MG: 25 TABLET, FILM COATED ORAL at 13:45

## 2023-01-01 RX ADMIN — MIDODRINE HYDROCHLORIDE 10 MG: 10 TABLET ORAL at 16:53

## 2023-01-01 RX ADMIN — VANCOMYCIN HYDROCHLORIDE 1250 MG: 10 INJECTION, POWDER, LYOPHILIZED, FOR SOLUTION INTRAVENOUS at 14:29

## 2023-01-01 RX ADMIN — EMPAGLIFLOZIN 10 MG: 10 TABLET, FILM COATED ORAL at 05:39

## 2023-01-01 RX ADMIN — MIDAZOLAM 2 MG: 1 INJECTION INTRAMUSCULAR; INTRAVENOUS at 04:15

## 2023-01-01 RX ADMIN — Medication 10 ML: at 21:12

## 2023-01-01 RX ADMIN — PETROLATUM: 420 OINTMENT TOPICAL at 22:13

## 2023-01-01 RX ADMIN — MIDAZOLAM 2 MG: 1 INJECTION INTRAMUSCULAR; INTRAVENOUS at 20:13

## 2023-01-01 RX ADMIN — ASPIRIN 81 MG: 81 TABLET, COATED ORAL at 08:37

## 2023-01-01 RX ADMIN — Medication 10 ML: at 22:14

## 2023-01-01 RX ADMIN — PHENYLEPHRINE HYDROCHLORIDE 80 MCG/MIN: 10 INJECTION, SOLUTION INTRAMUSCULAR; INTRAVENOUS; SUBCUTANEOUS at 00:34

## 2023-01-01 RX ADMIN — LACTULOSE 20 G: 20 SOLUTION ORAL at 08:45

## 2023-01-01 RX ADMIN — LACTULOSE 20 G: 20 SOLUTION ORAL at 12:55

## 2023-01-01 RX ADMIN — DEXMEDETOMIDINE 1.5 MCG/KG/HR: 100 INJECTION, SOLUTION INTRAVENOUS at 16:14

## 2023-01-01 RX ADMIN — Medication 10 ML: at 09:21

## 2023-01-01 RX ADMIN — DEXMEDETOMIDINE 1.5 MCG/KG/HR: 100 INJECTION, SOLUTION INTRAVENOUS at 15:13

## 2023-01-01 RX ADMIN — LACTULOSE 20 G: 20 SOLUTION ORAL at 20:54

## 2023-01-01 RX ADMIN — MIDODRINE HYDROCHLORIDE 10 MG: 10 TABLET ORAL at 11:27

## 2023-01-01 RX ADMIN — MIDODRINE HYDROCHLORIDE 10 MG: 10 TABLET ORAL at 16:15

## 2023-01-01 RX ADMIN — IPRATROPIUM BROMIDE AND ALBUTEROL SULFATE 3 ML: .5; 2.5 SOLUTION RESPIRATORY (INHALATION) at 09:02

## 2023-01-01 RX ADMIN — PETROLATUM: 420 OINTMENT TOPICAL at 11:28

## 2023-01-01 RX ADMIN — MULTIPLE VITAMINS W/ MINERALS TAB 1 TABLET: TAB at 08:38

## 2023-01-01 RX ADMIN — PANTOPRAZOLE SODIUM 40 MG: 40 INJECTION, POWDER, FOR SOLUTION INTRAVENOUS at 08:32

## 2023-01-01 RX ADMIN — RIFAXIMIN 550 MG: 550 TABLET ORAL at 08:04

## 2023-01-01 RX ADMIN — ARFORMOTEROL TARTRATE 15 MCG: 15 SOLUTION RESPIRATORY (INHALATION) at 08:25

## 2023-01-01 RX ADMIN — LACTULOSE 20 G: 20 SOLUTION ORAL at 09:29

## 2023-01-01 RX ADMIN — PROPOFOL 1000 MG: 10 INJECTION, EMULSION INTRAVENOUS at 06:20

## 2023-01-01 RX ADMIN — BISACODYL 10 MG: 10 SUPPOSITORY RECTAL at 20:16

## 2023-01-01 RX ADMIN — BUMETANIDE 2 MG: 0.25 INJECTION, SOLUTION INTRAMUSCULAR; INTRAVENOUS at 22:14

## 2023-01-01 RX ADMIN — Medication 10 ML: at 20:35

## 2023-01-01 RX ADMIN — PETROLATUM: 420 OINTMENT TOPICAL at 20:54

## 2023-01-01 RX ADMIN — MAGNESIUM SULFATE HEPTAHYDRATE 1000 MG: 1 INJECTION, SOLUTION INTRAVENOUS at 02:08

## 2023-01-01 RX ADMIN — ANTICOAGULANT SODIUM CITRATE SOLUTION: 4 SOLUTION INTRAVENOUS at 09:11

## 2023-01-01 RX ADMIN — PANTOPRAZOLE SODIUM 40 MG: 40 TABLET, DELAYED RELEASE ORAL at 05:10

## 2023-01-01 RX ADMIN — MULTIPLE VITAMINS W/ MINERALS TAB 1 TABLET: TAB at 08:27

## 2023-01-01 RX ADMIN — ISOSORBIDE MONONITRATE 30 MG: 30 TABLET, EXTENDED RELEASE ORAL at 08:46

## 2023-01-01 RX ADMIN — PIPERACILLIN AND TAZOBACTAM 4500 MG: 4; .5 INJECTION, POWDER, LYOPHILIZED, FOR SOLUTION INTRAVENOUS at 05:53

## 2023-01-01 RX ADMIN — MULTIPLE VITAMINS W/ MINERALS TAB 1 TABLET: TAB at 08:40

## 2023-01-01 RX ADMIN — IPRATROPIUM BROMIDE AND ALBUTEROL SULFATE 3 ML: .5; 2.5 SOLUTION RESPIRATORY (INHALATION) at 21:19

## 2023-01-01 RX ADMIN — BUDESONIDE 500 MCG: 0.5 SUSPENSION RESPIRATORY (INHALATION) at 08:04

## 2023-01-01 RX ADMIN — APIXABAN 2.5 MG: 2.5 TABLET, FILM COATED ORAL at 20:40

## 2023-01-01 RX ADMIN — GABAPENTIN 200 MG: 100 CAPSULE ORAL at 16:03

## 2023-01-01 RX ADMIN — ISOSORBIDE MONONITRATE 30 MG: 30 TABLET, EXTENDED RELEASE ORAL at 08:23

## 2023-01-01 RX ADMIN — HYDROCORTISONE SODIUM SUCCINATE 50 MG: 100 INJECTION, POWDER, FOR SOLUTION INTRAMUSCULAR; INTRAVENOUS at 04:00

## 2023-01-01 RX ADMIN — ASPIRIN 81 MG 81 MG: 81 TABLET ORAL at 08:29

## 2023-01-01 RX ADMIN — Medication 10 ML: at 08:24

## 2023-01-01 RX ADMIN — ANTICOAGULANT SODIUM CITRATE SOLUTION: 4 SOLUTION INTRAVENOUS at 09:27

## 2023-01-01 RX ADMIN — LACTULOSE 20 G: 20 SOLUTION ORAL at 21:19

## 2023-01-01 RX ADMIN — SENNOSIDES 15 ML: 8.8 SYRUP ORAL at 10:31

## 2023-01-01 RX ADMIN — Medication 10 ML: at 21:25

## 2023-01-01 RX ADMIN — POTASSIUM BICARBONATE 40 MEQ: 782 TABLET, EFFERVESCENT ORAL at 08:29

## 2023-01-01 RX ADMIN — RIFAXIMIN 550 MG: 550 TABLET ORAL at 20:40

## 2023-01-01 RX ADMIN — PIPERACILLIN AND TAZOBACTAM 4500 MG: 4; .5 INJECTION, POWDER, LYOPHILIZED, FOR SOLUTION INTRAVENOUS at 22:29

## 2023-01-01 RX ADMIN — BUDESONIDE 500 MCG: 0.5 SUSPENSION RESPIRATORY (INHALATION) at 08:25

## 2023-01-01 RX ADMIN — Medication: at 14:54

## 2023-01-01 RX ADMIN — ARFORMOTEROL TARTRATE 15 MCG: 15 SOLUTION RESPIRATORY (INHALATION) at 08:08

## 2023-01-01 RX ADMIN — EPOETIN ALFA-EPBX 3000 UNITS: 3000 INJECTION, SOLUTION INTRAVENOUS; SUBCUTANEOUS at 12:09

## 2023-01-01 RX ADMIN — Medication 10 ML: at 08:49

## 2023-01-01 RX ADMIN — HEPARIN 100 UNITS: 100 SYRINGE at 02:06

## 2023-01-01 RX ADMIN — RIFAXIMIN 550 MG: 550 TABLET ORAL at 20:35

## 2023-01-01 RX ADMIN — Medication 10 ML: at 20:17

## 2023-01-01 RX ADMIN — Medication 75 MCG/HR: at 06:46

## 2023-01-01 RX ADMIN — MORPHINE SULFATE 4 MG: 4 INJECTION, SOLUTION INTRAMUSCULAR; INTRAVENOUS at 16:16

## 2023-01-01 RX ADMIN — Medication 10 ML: at 20:40

## 2023-01-01 RX ADMIN — PETROLATUM: 420 OINTMENT TOPICAL at 20:17

## 2023-01-01 RX ADMIN — CHLORHEXIDINE GLUCONATE 0.12% ORAL RINSE 15 ML: 1.2 LIQUID ORAL at 20:16

## 2023-01-01 RX ADMIN — EPOETIN ALFA-EPBX 3000 UNITS: 3000 INJECTION, SOLUTION INTRAVENOUS; SUBCUTANEOUS at 08:55

## 2023-01-01 RX ADMIN — Medication 100 MCG/HR: at 00:43

## 2023-01-01 RX ADMIN — LACTULOSE 20 G: 20 SOLUTION ORAL at 07:35

## 2023-01-01 RX ADMIN — EMPAGLIFLOZIN 10 MG: 10 TABLET, FILM COATED ORAL at 07:05

## 2023-01-01 RX ADMIN — APIXABAN 2.5 MG: 2.5 TABLET, FILM COATED ORAL at 08:53

## 2023-01-01 RX ADMIN — PHENYLEPHRINE HYDROCHLORIDE 30 MCG/MIN: 10 INJECTION, SOLUTION INTRAMUSCULAR; INTRAVENOUS; SUBCUTANEOUS at 09:13

## 2023-01-01 RX ADMIN — PANTOPRAZOLE SODIUM 40 MG: 40 INJECTION, POWDER, FOR SOLUTION INTRAVENOUS at 20:17

## 2023-01-01 RX ADMIN — LACTULOSE 20 G: 20 SOLUTION ORAL at 08:38

## 2023-01-01 RX ADMIN — MIDODRINE HYDROCHLORIDE 10 MG: 10 TABLET ORAL at 15:50

## 2023-01-01 RX ADMIN — PETROLATUM: 420 OINTMENT TOPICAL at 20:55

## 2023-01-01 RX ADMIN — PIPERACILLIN AND TAZOBACTAM 4500 MG: 4; .5 INJECTION, POWDER, LYOPHILIZED, FOR SOLUTION INTRAVENOUS at 16:15

## 2023-01-01 RX ADMIN — PETROLATUM: 420 OINTMENT TOPICAL at 08:31

## 2023-01-01 RX ADMIN — FUROSEMIDE 60 MG: 10 INJECTION, SOLUTION INTRAMUSCULAR; INTRAVENOUS at 08:45

## 2023-01-01 RX ADMIN — IPRATROPIUM BROMIDE AND ALBUTEROL SULFATE 3 ML: .5; 2.5 SOLUTION RESPIRATORY (INHALATION) at 12:01

## 2023-01-01 RX ADMIN — BUMETANIDE 1 MG/HR: 0.25 INJECTION INTRAMUSCULAR; INTRAVENOUS at 10:11

## 2023-01-01 RX ADMIN — FENTANYL CITRATE 25 MCG: 50 INJECTION, SOLUTION INTRAMUSCULAR; INTRAVENOUS at 14:49

## 2023-01-01 RX ADMIN — DIGOXIN 250 MCG: 0.25 INJECTION INTRAMUSCULAR; INTRAVENOUS at 02:19

## 2023-01-01 RX ADMIN — LACTULOSE 20 G: 20 SOLUTION ORAL at 11:11

## 2023-01-01 RX ADMIN — FENTANYL CITRATE 25 MCG: 50 INJECTION, SOLUTION INTRAMUSCULAR; INTRAVENOUS at 11:35

## 2023-01-01 RX ADMIN — IPRATROPIUM BROMIDE AND ALBUTEROL SULFATE 3 ML: .5; 2.5 SOLUTION RESPIRATORY (INHALATION) at 08:44

## 2023-01-01 RX ADMIN — PETROLATUM: 420 OINTMENT TOPICAL at 09:10

## 2023-01-01 RX ADMIN — NYSTATIN 500000 UNITS: 100000 SUSPENSION ORAL at 09:29

## 2023-01-01 RX ADMIN — NYSTATIN 500000 UNITS: 100000 SUSPENSION ORAL at 16:39

## 2023-01-01 RX ADMIN — PHENYLEPHRINE HYDROCHLORIDE 30 MCG/MIN: 10 INJECTION INTRAVENOUS at 13:13

## 2023-01-01 RX ADMIN — Medication 10 ML: at 08:25

## 2023-01-01 RX ADMIN — PANTOPRAZOLE SODIUM 40 MG: 40 INJECTION, POWDER, FOR SOLUTION INTRAVENOUS at 08:11

## 2023-01-01 RX ADMIN — GABAPENTIN 200 MG: 100 CAPSULE ORAL at 08:21

## 2023-01-01 RX ADMIN — ARFORMOTEROL TARTRATE 15 MCG: 15 SOLUTION RESPIRATORY (INHALATION) at 20:54

## 2023-01-01 RX ADMIN — LACTULOSE 20 G: 20 SOLUTION ORAL at 11:27

## 2023-01-01 RX ADMIN — ATORVASTATIN CALCIUM 40 MG: 40 TABLET, FILM COATED ORAL at 22:14

## 2023-01-01 RX ADMIN — SENNOSIDES 15 ML: 8.8 SYRUP ORAL at 07:39

## 2023-01-01 RX ADMIN — LACTULOSE 20 G: 20 SOLUTION ORAL at 08:33

## 2023-01-01 RX ADMIN — Medication 50 MCG/HR: at 23:42

## 2023-01-01 RX ADMIN — PHENYLEPHRINE HYDROCHLORIDE 120 MCG/MIN: 10 INJECTION, SOLUTION INTRAMUSCULAR; INTRAVENOUS; SUBCUTANEOUS at 04:31

## 2023-01-01 RX ADMIN — GABAPENTIN 200 MG: 100 CAPSULE ORAL at 21:13

## 2023-01-01 RX ADMIN — MAGNESIUM SULFATE HEPTAHYDRATE 2000 MG: 40 INJECTION, SOLUTION INTRAVENOUS at 10:55

## 2023-01-01 RX ADMIN — BUMETANIDE 2 MG/HR: 0.25 INJECTION INTRAMUSCULAR; INTRAVENOUS at 02:53

## 2023-01-01 RX ADMIN — Medication 10 ML: at 07:38

## 2023-01-01 RX ADMIN — PANTOPRAZOLE SODIUM 40 MG: 40 INJECTION, POWDER, FOR SOLUTION INTRAVENOUS at 19:54

## 2023-01-01 RX ADMIN — BUMETANIDE 2 MG/HR: 0.25 INJECTION INTRAMUSCULAR; INTRAVENOUS at 14:58

## 2023-01-01 RX ADMIN — NYSTATIN 500000 UNITS: 100000 SUSPENSION ORAL at 12:19

## 2023-01-01 RX ADMIN — PHENYLEPHRINE HYDROCHLORIDE 140 MCG/MIN: 10 INJECTION, SOLUTION INTRAMUSCULAR; INTRAVENOUS; SUBCUTANEOUS at 09:28

## 2023-01-01 RX ADMIN — LACTULOSE 20 G: 20 SOLUTION ORAL at 12:10

## 2023-01-01 RX ADMIN — PANTOPRAZOLE SODIUM 40 MG: 40 INJECTION, POWDER, FOR SOLUTION INTRAVENOUS at 21:39

## 2023-01-01 RX ADMIN — BUDESONIDE 500 MCG: 0.5 SUSPENSION RESPIRATORY (INHALATION) at 10:16

## 2023-01-01 RX ADMIN — PIPERACILLIN AND TAZOBACTAM 4500 MG: 4; .5 INJECTION, POWDER, LYOPHILIZED, FOR SOLUTION INTRAVENOUS at 20:15

## 2023-01-01 RX ADMIN — Medication 75 MCG/HR: at 07:15

## 2023-01-01 RX ADMIN — APIXABAN 2.5 MG: 2.5 TABLET, FILM COATED ORAL at 21:13

## 2023-01-01 RX ADMIN — PIPERACILLIN AND TAZOBACTAM 4500 MG: 4; .5 INJECTION, POWDER, LYOPHILIZED, FOR SOLUTION INTRAVENOUS at 08:43

## 2023-01-01 RX ADMIN — PANTOPRAZOLE SODIUM 40 MG: 40 TABLET, DELAYED RELEASE ORAL at 05:15

## 2023-01-01 RX ADMIN — Medication: at 16:12

## 2023-01-01 RX ADMIN — ARFORMOTEROL TARTRATE 15 MCG: 15 SOLUTION RESPIRATORY (INHALATION) at 08:00

## 2023-01-01 RX ADMIN — RIFAXIMIN 550 MG: 550 TABLET ORAL at 08:11

## 2023-01-01 RX ADMIN — RIFAXIMIN 550 MG: 550 TABLET ORAL at 08:38

## 2023-01-01 RX ADMIN — PANTOPRAZOLE SODIUM 40 MG: 40 INJECTION, POWDER, FOR SOLUTION INTRAVENOUS at 08:49

## 2023-01-01 RX ADMIN — Medication 10 ML: at 20:50

## 2023-01-01 RX ADMIN — METOPROLOL SUCCINATE 50 MG: 50 TABLET, EXTENDED RELEASE ORAL at 08:46

## 2023-01-01 RX ADMIN — MULTIPLE VITAMINS W/ MINERALS TAB 1 TABLET: TAB at 08:49

## 2023-01-01 RX ADMIN — Medication 100 MCG/HR: at 04:59

## 2023-01-01 RX ADMIN — BUDESONIDE 500 MCG: 0.5 SUSPENSION RESPIRATORY (INHALATION) at 08:00

## 2023-01-01 RX ADMIN — LACTULOSE 20 G: 20 SOLUTION ORAL at 08:21

## 2023-01-01 RX ADMIN — ANTICOAGULANT SODIUM CITRATE SOLUTION: 4 SOLUTION INTRAVENOUS at 03:11

## 2023-01-01 RX ADMIN — Medication 10 ML: at 21:29

## 2023-01-01 RX ADMIN — RIFAXIMIN 550 MG: 550 TABLET ORAL at 20:55

## 2023-01-01 RX ADMIN — PIPERACILLIN AND TAZOBACTAM 4500 MG: 4; .5 INJECTION, POWDER, LYOPHILIZED, FOR SOLUTION INTRAVENOUS at 08:34

## 2023-01-01 RX ADMIN — LEVETIRACETAM 250 MG: 100 INJECTION INTRAVENOUS at 22:17

## 2023-01-01 RX ADMIN — PETROLATUM: 420 OINTMENT TOPICAL at 21:12

## 2023-01-01 RX ADMIN — MIDODRINE HYDROCHLORIDE 10 MG: 10 TABLET ORAL at 17:05

## 2023-01-01 RX ADMIN — ATORVASTATIN CALCIUM 40 MG: 40 TABLET, FILM COATED ORAL at 21:12

## 2023-01-01 RX ADMIN — Medication 6000 UNITS: at 08:32

## 2023-01-01 RX ADMIN — MAGNESIUM SULFATE HEPTAHYDRATE 1000 MG: 1 INJECTION, SOLUTION INTRAVENOUS at 01:07

## 2023-01-01 RX ADMIN — RIFAXIMIN 550 MG: 550 TABLET ORAL at 21:00

## 2023-01-01 RX ADMIN — BUMETANIDE 2 MG/HR: 0.25 INJECTION INTRAMUSCULAR; INTRAVENOUS at 01:35

## 2023-01-01 RX ADMIN — RIFAXIMIN 550 MG: 550 TABLET ORAL at 08:30

## 2023-01-01 RX ADMIN — NYSTATIN 500000 UNITS: 100000 SUSPENSION ORAL at 12:03

## 2023-01-01 RX ADMIN — ARFORMOTEROL TARTRATE 15 MCG: 15 SOLUTION RESPIRATORY (INHALATION) at 19:38

## 2023-01-01 RX ADMIN — Medication 10 ML: at 08:32

## 2023-01-01 RX ADMIN — POLYETHYLENE GLYCOL 3350 17 G: 17 POWDER, FOR SOLUTION ORAL at 11:26

## 2023-01-01 RX ADMIN — BUMETANIDE 2 MG/HR: 0.25 INJECTION INTRAMUSCULAR; INTRAVENOUS at 12:49

## 2023-01-01 RX ADMIN — PETROLATUM: 420 OINTMENT TOPICAL at 08:29

## 2023-01-01 RX ADMIN — LACTULOSE 20 G: 20 SOLUTION ORAL at 20:16

## 2023-01-01 RX ADMIN — BUDESONIDE 500 MCG: 0.5 SUSPENSION RESPIRATORY (INHALATION) at 19:27

## 2023-01-01 RX ADMIN — APIXABAN 2.5 MG: 2.5 TABLET, FILM COATED ORAL at 22:03

## 2023-01-01 RX ADMIN — NYSTATIN 500000 UNITS: 100000 SUSPENSION ORAL at 12:09

## 2023-01-01 RX ADMIN — NYSTATIN 500000 UNITS: 100000 SUSPENSION ORAL at 16:49

## 2023-01-01 RX ADMIN — PETROLATUM: 420 OINTMENT TOPICAL at 20:35

## 2023-01-01 RX ADMIN — MIDODRINE HYDROCHLORIDE 10 MG: 10 TABLET ORAL at 12:07

## 2023-01-01 RX ADMIN — DOXAZOSIN 2 MG: 2 TABLET ORAL at 08:40

## 2023-01-01 RX ADMIN — GABAPENTIN 200 MG: 100 CAPSULE ORAL at 13:33

## 2023-01-01 RX ADMIN — Medication 25 MCG/HR: at 07:11

## 2023-01-01 RX ADMIN — PETROLATUM: 420 OINTMENT TOPICAL at 19:52

## 2023-01-01 RX ADMIN — BUDESONIDE 500 MCG: 0.5 SUSPENSION RESPIRATORY (INHALATION) at 19:39

## 2023-01-01 RX ADMIN — APIXABAN 2.5 MG: 2.5 TABLET, FILM COATED ORAL at 20:46

## 2023-01-01 RX ADMIN — IPRATROPIUM BROMIDE AND ALBUTEROL SULFATE 3 ML: .5; 2.5 SOLUTION RESPIRATORY (INHALATION) at 19:45

## 2023-01-01 RX ADMIN — MIDODRINE HYDROCHLORIDE 10 MG: 10 TABLET ORAL at 16:39

## 2023-01-01 RX ADMIN — LACTULOSE 20 G: 20 SOLUTION ORAL at 19:52

## 2023-01-01 RX ADMIN — CHLORHEXIDINE GLUCONATE 0.12% ORAL RINSE 15 ML: 1.2 LIQUID ORAL at 09:41

## 2023-01-01 RX ADMIN — PIPERACILLIN AND TAZOBACTAM 4500 MG: 4; .5 INJECTION, POWDER, LYOPHILIZED, FOR SOLUTION INTRAVENOUS at 20:26

## 2023-01-01 RX ADMIN — PANTOPRAZOLE SODIUM 40 MG: 40 INJECTION, POWDER, FOR SOLUTION INTRAVENOUS at 21:21

## 2023-01-01 RX ADMIN — RIFAXIMIN 550 MG: 550 TABLET ORAL at 08:26

## 2023-01-01 RX ADMIN — GABAPENTIN 200 MG: 100 CAPSULE ORAL at 08:35

## 2023-01-01 RX ADMIN — PETROLATUM: 420 OINTMENT TOPICAL at 11:13

## 2023-01-01 RX ADMIN — BUDESONIDE 500 MCG: 0.5 SUSPENSION RESPIRATORY (INHALATION) at 08:05

## 2023-01-01 RX ADMIN — PIPERACILLIN AND TAZOBACTAM 4500 MG: 4; .5 INJECTION, POWDER, LYOPHILIZED, FOR SOLUTION INTRAVENOUS at 21:37

## 2023-01-01 RX ADMIN — ANTICOAGULANT SODIUM CITRATE SOLUTION: 4 SOLUTION INTRAVENOUS at 04:00

## 2023-01-01 RX ADMIN — HYDRALAZINE HYDROCHLORIDE 25 MG: 25 TABLET, FILM COATED ORAL at 06:19

## 2023-01-01 RX ADMIN — IPRATROPIUM BROMIDE AND ALBUTEROL SULFATE 3 ML: .5; 2.5 SOLUTION RESPIRATORY (INHALATION) at 09:45

## 2023-01-01 RX ADMIN — LACTULOSE 20 G: 20 SOLUTION ORAL at 20:27

## 2023-01-01 RX ADMIN — TAMSULOSIN HYDROCHLORIDE 0.4 MG: 0.4 CAPSULE ORAL at 08:53

## 2023-01-01 RX ADMIN — PETROLATUM: 420 OINTMENT TOPICAL at 20:28

## 2023-01-01 RX ADMIN — BUDESONIDE 500 MCG: 0.5 SUSPENSION RESPIRATORY (INHALATION) at 21:04

## 2023-01-01 RX ADMIN — GABAPENTIN 200 MG: 100 CAPSULE ORAL at 20:35

## 2023-01-01 RX ADMIN — HYDROCORTISONE SODIUM SUCCINATE 100 MG: 100 INJECTION, POWDER, FOR SOLUTION INTRAMUSCULAR; INTRAVENOUS at 12:20

## 2023-01-01 RX ADMIN — IPRATROPIUM BROMIDE AND ALBUTEROL SULFATE 3 ML: .5; 2.5 SOLUTION RESPIRATORY (INHALATION) at 12:26

## 2023-01-01 RX ADMIN — LACTULOSE 20 G: 20 SOLUTION ORAL at 20:40

## 2023-01-01 RX ADMIN — PIPERACILLIN AND TAZOBACTAM 3375 MG: 3; .375 INJECTION, POWDER, FOR SOLUTION INTRAVENOUS at 21:38

## 2023-01-01 RX ADMIN — SODIUM CHLORIDE 250 ML: 9 INJECTION, SOLUTION INTRAVENOUS at 21:42

## 2023-01-01 RX ADMIN — IPRATROPIUM BROMIDE AND ALBUTEROL SULFATE 3 ML: .5; 2.5 SOLUTION RESPIRATORY (INHALATION) at 16:05

## 2023-01-01 RX ADMIN — Medication 10 ML: at 21:08

## 2023-01-01 RX ADMIN — RIFAXIMIN 550 MG: 550 TABLET ORAL at 09:21

## 2023-01-01 RX ADMIN — ALBUMIN (HUMAN) 25 G: 0.25 INJECTION, SOLUTION INTRAVENOUS at 12:04

## 2023-01-01 RX ADMIN — POTASSIUM CHLORIDE 40 MEQ: 29.8 INJECTION, SOLUTION INTRAVENOUS at 17:57

## 2023-01-01 RX ADMIN — IPRATROPIUM BROMIDE AND ALBUTEROL SULFATE 3 ML: .5; 2.5 SOLUTION RESPIRATORY (INHALATION) at 20:07

## 2023-01-01 RX ADMIN — Medication 10 ML: at 21:20

## 2023-01-01 RX ADMIN — MIDODRINE HYDROCHLORIDE 10 MG: 10 TABLET ORAL at 12:04

## 2023-01-01 RX ADMIN — RIFAXIMIN 550 MG: 550 TABLET ORAL at 20:54

## 2023-01-01 RX ADMIN — MULTIPLE VITAMINS W/ MINERALS TAB 1 TABLET: TAB at 10:34

## 2023-01-01 RX ADMIN — BUDESONIDE 500 MCG: 0.5 SUSPENSION RESPIRATORY (INHALATION) at 07:54

## 2023-01-01 RX ADMIN — POLYETHYLENE GLYCOL 3350 17 G: 17 POWDER, FOR SOLUTION ORAL at 07:33

## 2023-01-01 RX ADMIN — ANTICOAGULANT SODIUM CITRATE SOLUTION: 4 SOLUTION INTRAVENOUS at 08:40

## 2023-01-01 RX ADMIN — LACTULOSE 20 G: 20 SOLUTION ORAL at 15:29

## 2023-01-01 RX ADMIN — ANTICOAGULANT SODIUM CITRATE SOLUTION: 4 SOLUTION INTRAVENOUS at 17:06

## 2023-01-01 RX ADMIN — LACTULOSE 20 G: 20 SOLUTION ORAL at 20:55

## 2023-01-01 RX ADMIN — DOXAZOSIN 2 MG: 2 TABLET ORAL at 09:29

## 2023-01-01 RX ADMIN — Medication 10 ML: at 21:17

## 2023-01-01 RX ADMIN — CHLORHEXIDINE GLUCONATE 0.12% ORAL RINSE 15 ML: 1.2 LIQUID ORAL at 07:35

## 2023-01-01 RX ADMIN — BUDESONIDE 500 MCG: 0.5 SUSPENSION RESPIRATORY (INHALATION) at 07:55

## 2023-01-01 RX ADMIN — ARFORMOTEROL TARTRATE 15 MCG: 15 SOLUTION RESPIRATORY (INHALATION) at 08:03

## 2023-01-01 RX ADMIN — PHENYLEPHRINE HYDROCHLORIDE 115 MCG/MIN: 10 INJECTION, SOLUTION INTRAMUSCULAR; INTRAVENOUS; SUBCUTANEOUS at 11:57

## 2023-01-01 RX ADMIN — PETROLATUM: 420 OINTMENT TOPICAL at 20:48

## 2023-01-01 RX ADMIN — CHLORHEXIDINE GLUCONATE 0.12% ORAL RINSE 15 ML: 1.2 LIQUID ORAL at 09:21

## 2023-01-01 RX ADMIN — ARFORMOTEROL TARTRATE 15 MCG: 15 SOLUTION RESPIRATORY (INHALATION) at 19:47

## 2023-01-01 RX ADMIN — Medication 25 MCG/HR: at 12:05

## 2023-01-01 RX ADMIN — MIDODRINE HYDROCHLORIDE 10 MG: 10 TABLET ORAL at 16:11

## 2023-01-01 RX ADMIN — PANTOPRAZOLE SODIUM 40 MG: 40 INJECTION, POWDER, FOR SOLUTION INTRAVENOUS at 08:36

## 2023-01-01 RX ADMIN — PHENYLEPHRINE HYDROCHLORIDE 75 MCG/MIN: 10 INJECTION, SOLUTION INTRAMUSCULAR; INTRAVENOUS; SUBCUTANEOUS at 12:35

## 2023-01-01 RX ADMIN — FENTANYL CITRATE 25 MCG: 50 INJECTION, SOLUTION INTRAMUSCULAR; INTRAVENOUS at 05:26

## 2023-01-01 RX ADMIN — ARFORMOTEROL TARTRATE 15 MCG: 15 SOLUTION RESPIRATORY (INHALATION) at 08:18

## 2023-01-01 RX ADMIN — IPRATROPIUM BROMIDE AND ALBUTEROL SULFATE 3 ML: .5; 2.5 SOLUTION RESPIRATORY (INHALATION) at 20:54

## 2023-01-01 RX ADMIN — APIXABAN 2.5 MG: 2.5 TABLET, FILM COATED ORAL at 21:40

## 2023-01-01 RX ADMIN — CALCIUM GLUCONATE 1000 MG: 10 INJECTION, SOLUTION INTRAVENOUS at 00:47

## 2023-01-01 RX ADMIN — GABAPENTIN 200 MG: 100 CAPSULE ORAL at 10:35

## 2023-01-01 RX ADMIN — CHLORHEXIDINE GLUCONATE 0.12% ORAL RINSE 15 ML: 1.2 LIQUID ORAL at 21:41

## 2023-01-01 RX ADMIN — PANTOPRAZOLE SODIUM 40 MG: 40 INJECTION, POWDER, FOR SOLUTION INTRAVENOUS at 08:25

## 2023-01-01 RX ADMIN — ANTICOAGULANT SODIUM CITRATE SOLUTION: 4 SOLUTION INTRAVENOUS at 22:56

## 2023-01-01 RX ADMIN — ACETAMINOPHEN 650 MG: 325 TABLET ORAL at 21:37

## 2023-01-01 RX ADMIN — POLYVINYL ALCOHOL 1 DROP: 14 SOLUTION/ DROPS OPHTHALMIC at 20:27

## 2023-01-01 RX ADMIN — LACTULOSE 20 G: 20 SOLUTION ORAL at 20:06

## 2023-01-01 RX ADMIN — ARFORMOTEROL TARTRATE 15 MCG: 15 SOLUTION RESPIRATORY (INHALATION) at 07:46

## 2023-01-01 RX ADMIN — HYDRALAZINE HYDROCHLORIDE 25 MG: 25 TABLET, FILM COATED ORAL at 21:41

## 2023-01-01 RX ADMIN — DEXTROSE MONOHYDRATE: 50 INJECTION, SOLUTION INTRAVENOUS at 10:59

## 2023-01-01 RX ADMIN — Medication: at 09:55

## 2023-01-01 RX ADMIN — RIFAXIMIN 550 MG: 550 TABLET ORAL at 08:25

## 2023-01-01 RX ADMIN — Medication 10 ML: at 08:20

## 2023-01-01 RX ADMIN — APIXABAN 2.5 MG: 2.5 TABLET, FILM COATED ORAL at 20:26

## 2023-01-01 RX ADMIN — LACTULOSE 20 G: 20 SOLUTION ORAL at 15:05

## 2023-01-01 RX ADMIN — POLYETHYLENE GLYCOL 3350 17 G: 17 POWDER, FOR SOLUTION ORAL at 21:20

## 2023-01-01 RX ADMIN — PIPERACILLIN AND TAZOBACTAM 4500 MG: 4; .5 INJECTION, POWDER, LYOPHILIZED, FOR SOLUTION INTRAVENOUS at 17:02

## 2023-01-01 RX ADMIN — ETOMIDATE 20 MG: 2 INJECTION INTRAVENOUS at 12:34

## 2023-01-01 RX ADMIN — Medication 6000 UNITS: at 11:26

## 2023-01-01 RX ADMIN — POLYETHYLENE GLYCOL 3350 17 G: 17 POWDER, FOR SOLUTION ORAL at 21:12

## 2023-01-01 RX ADMIN — PETROLATUM: 420 OINTMENT TOPICAL at 07:33

## 2023-01-01 RX ADMIN — SENNOSIDES 15 ML: 8.8 SYRUP ORAL at 21:17

## 2023-01-01 RX ADMIN — Medication 10 ML: at 11:26

## 2023-01-01 RX ADMIN — MIDODRINE HYDROCHLORIDE 10 MG: 10 TABLET ORAL at 12:48

## 2023-01-01 RX ADMIN — MULTIPLE VITAMINS W/ MINERALS TAB 1 TABLET: TAB at 08:25

## 2023-01-01 RX ADMIN — NYSTATIN 500000 UNITS: 100000 SUSPENSION ORAL at 13:44

## 2023-01-01 RX ADMIN — APIXABAN 2.5 MG: 2.5 TABLET, FILM COATED ORAL at 10:35

## 2023-01-01 RX ADMIN — SODIUM CHLORIDE 125 MG: 9 INJECTION, SOLUTION INTRAVENOUS at 08:38

## 2023-01-01 RX ADMIN — CALCIUM CHLORIDE INJECTION 8000 MG: 100 INJECTION, SOLUTION INTRAVENOUS at 07:14

## 2023-01-01 RX ADMIN — NYSTATIN 500000 UNITS: 100000 SUSPENSION ORAL at 15:50

## 2023-01-01 RX ADMIN — Medication 10 ML: at 08:36

## 2023-01-01 RX ADMIN — ANTICOAGULANT SODIUM CITRATE SOLUTION: 4 SOLUTION INTRAVENOUS at 12:14

## 2023-01-01 RX ADMIN — LACTULOSE 20 G: 20 SOLUTION ORAL at 14:26

## 2023-01-01 RX ADMIN — LACTULOSE 20 G: 20 SOLUTION ORAL at 07:37

## 2023-01-01 RX ADMIN — CALCIUM GLUCONATE 1000 MG: 10 INJECTION, SOLUTION INTRAVENOUS at 19:28

## 2023-01-01 RX ADMIN — PETROLATUM: 420 OINTMENT TOPICAL at 08:12

## 2023-01-01 RX ADMIN — IPRATROPIUM BROMIDE AND ALBUTEROL SULFATE 3 ML: .5; 2.5 SOLUTION RESPIRATORY (INHALATION) at 20:14

## 2023-01-01 RX ADMIN — ANTICOAGULANT SODIUM CITRATE SOLUTION: 4 SOLUTION INTRAVENOUS at 11:35

## 2023-01-01 RX ADMIN — POLYETHYLENE GLYCOL 3350 17 G: 17 POWDER, FOR SOLUTION ORAL at 20:44

## 2023-01-01 RX ADMIN — HYDRALAZINE HYDROCHLORIDE 25 MG: 25 TABLET, FILM COATED ORAL at 06:46

## 2023-01-01 RX ADMIN — GABAPENTIN 200 MG: 100 CAPSULE ORAL at 21:08

## 2023-01-01 RX ADMIN — ATORVASTATIN CALCIUM 40 MG: 40 TABLET, FILM COATED ORAL at 21:40

## 2023-01-01 RX ADMIN — LACTULOSE 20 G: 20 SOLUTION ORAL at 20:47

## 2023-01-01 RX ADMIN — BUMETANIDE 2 MG/HR: 0.25 INJECTION INTRAMUSCULAR; INTRAVENOUS at 05:45

## 2023-01-01 RX ADMIN — PANTOPRAZOLE SODIUM 40 MG: 40 INJECTION, POWDER, FOR SOLUTION INTRAVENOUS at 21:29

## 2023-01-01 RX ADMIN — CALCIUM CHLORIDE INJECTION 8000 MG: 100 INJECTION, SOLUTION INTRAVENOUS at 20:25

## 2023-01-01 RX ADMIN — METOPROLOL SUCCINATE 50 MG: 50 TABLET, EXTENDED RELEASE ORAL at 10:34

## 2023-01-01 RX ADMIN — Medication 10 ML: at 20:13

## 2023-01-01 RX ADMIN — MIDODRINE HYDROCHLORIDE 10 MG: 10 TABLET ORAL at 15:59

## 2023-01-01 RX ADMIN — ALBUMIN (HUMAN) 25 G: 0.25 INJECTION, SOLUTION INTRAVENOUS at 00:15

## 2023-01-01 RX ADMIN — CHLORHEXIDINE GLUCONATE 0.12% ORAL RINSE 15 ML: 1.2 LIQUID ORAL at 08:25

## 2023-01-01 RX ADMIN — NYSTATIN 500000 UNITS: 100000 SUSPENSION ORAL at 08:46

## 2023-01-01 RX ADMIN — PETROLATUM: 420 OINTMENT TOPICAL at 20:50

## 2023-01-01 RX ADMIN — NYSTATIN 500000 UNITS: 100000 SUSPENSION ORAL at 13:05

## 2023-01-01 RX ADMIN — RIFAXIMIN 550 MG: 550 TABLET ORAL at 08:46

## 2023-01-01 RX ADMIN — BUDESONIDE 500 MCG: 0.5 SUSPENSION RESPIRATORY (INHALATION) at 19:55

## 2023-01-01 RX ADMIN — RIFAXIMIN 550 MG: 550 TABLET ORAL at 20:53

## 2023-01-01 RX ADMIN — BUDESONIDE 500 MCG: 0.5 SUSPENSION RESPIRATORY (INHALATION) at 08:15

## 2023-01-01 RX ADMIN — Medication 2 MG/HR: at 11:38

## 2023-01-01 RX ADMIN — PANTOPRAZOLE SODIUM 40 MG: 40 TABLET, DELAYED RELEASE ORAL at 05:38

## 2023-01-01 RX ADMIN — ARFORMOTEROL TARTRATE 15 MCG: 15 SOLUTION RESPIRATORY (INHALATION) at 07:55

## 2023-01-01 RX ADMIN — MIDODRINE HYDROCHLORIDE 10 MG: 10 TABLET ORAL at 08:26

## 2023-01-01 RX ADMIN — MIDODRINE HYDROCHLORIDE 10 MG: 10 TABLET ORAL at 12:03

## 2023-01-01 RX ADMIN — LEVETIRACETAM 250 MG: 100 INJECTION INTRAVENOUS at 22:45

## 2023-01-01 RX ADMIN — Medication 10 ML: at 21:41

## 2023-01-01 RX ADMIN — PETROLATUM: 420 OINTMENT TOPICAL at 22:05

## 2023-01-01 RX ADMIN — DEXMEDETOMIDINE 0.8 MCG/KG/HR: 100 INJECTION, SOLUTION INTRAVENOUS at 05:10

## 2023-01-01 RX ADMIN — HYDRALAZINE HYDROCHLORIDE 25 MG: 25 TABLET, FILM COATED ORAL at 14:47

## 2023-01-01 RX ADMIN — BISACODYL 10 MG: 10 SUPPOSITORY RECTAL at 20:57

## 2023-01-01 RX ADMIN — BUMETANIDE 2 MG: 0.25 INJECTION, SOLUTION INTRAMUSCULAR; INTRAVENOUS at 21:08

## 2023-01-01 RX ADMIN — NYSTATIN 500000 UNITS: 100000 SUSPENSION ORAL at 16:53

## 2023-01-01 RX ADMIN — PANTOPRAZOLE SODIUM 40 MG: 40 INJECTION, POWDER, FOR SOLUTION INTRAVENOUS at 21:19

## 2023-01-01 RX ADMIN — PANTOPRAZOLE SODIUM 40 MG: 40 INJECTION, POWDER, FOR SOLUTION INTRAVENOUS at 09:41

## 2023-01-01 RX ADMIN — LORAZEPAM 1 MG: 2 INJECTION INTRAMUSCULAR; INTRAVENOUS at 14:21

## 2023-01-01 RX ADMIN — PHENYLEPHRINE HYDROCHLORIDE 90 MCG/MIN: 10 INJECTION, SOLUTION INTRAMUSCULAR; INTRAVENOUS; SUBCUTANEOUS at 00:43

## 2023-01-01 RX ADMIN — Medication: at 20:07

## 2023-01-01 RX ADMIN — BUDESONIDE 500 MCG: 0.5 SUSPENSION RESPIRATORY (INHALATION) at 19:59

## 2023-01-01 RX ADMIN — ARFORMOTEROL TARTRATE 15 MCG: 15 SOLUTION RESPIRATORY (INHALATION) at 07:56

## 2023-01-01 RX ADMIN — HYDROCORTISONE SODIUM SUCCINATE 50 MG: 100 INJECTION, POWDER, FOR SOLUTION INTRAMUSCULAR; INTRAVENOUS at 17:45

## 2023-01-01 RX ADMIN — MIDODRINE HYDROCHLORIDE 10 MG: 10 TABLET ORAL at 11:46

## 2023-01-01 RX ADMIN — LACTULOSE 20 G: 20 SOLUTION ORAL at 08:26

## 2023-01-01 RX ADMIN — HYDROCORTISONE SODIUM SUCCINATE 50 MG: 100 INJECTION, POWDER, FOR SOLUTION INTRAMUSCULAR; INTRAVENOUS at 04:55

## 2023-01-01 RX ADMIN — BUMETANIDE 1 MG/HR: 0.25 INJECTION INTRAMUSCULAR; INTRAVENOUS at 21:46

## 2023-01-01 RX ADMIN — MULTIPLE VITAMINS W/ MINERALS TAB 1 TABLET: TAB at 07:37

## 2023-01-01 RX ADMIN — APIXABAN 2.5 MG: 2.5 TABLET, FILM COATED ORAL at 09:28

## 2023-01-01 RX ADMIN — BUDESONIDE 500 MCG: 0.5 SUSPENSION RESPIRATORY (INHALATION) at 08:18

## 2023-01-01 RX ADMIN — HYDRALAZINE HYDROCHLORIDE 25 MG: 25 TABLET, FILM COATED ORAL at 07:05

## 2023-01-01 RX ADMIN — IPRATROPIUM BROMIDE AND ALBUTEROL SULFATE 3 ML: .5; 2.5 SOLUTION RESPIRATORY (INHALATION) at 08:16

## 2023-01-01 RX ADMIN — PHENYLEPHRINE HYDROCHLORIDE 150 MCG/MIN: 10 INJECTION, SOLUTION INTRAMUSCULAR; INTRAVENOUS; SUBCUTANEOUS at 04:04

## 2023-01-01 RX ADMIN — ANTICOAGULANT SODIUM CITRATE SOLUTION: 4 SOLUTION INTRAVENOUS at 20:30

## 2023-01-01 RX ADMIN — PHENYLEPHRINE HYDROCHLORIDE 50 MCG/MIN: 10 INJECTION INTRAVENOUS at 09:03

## 2023-01-01 RX ADMIN — ACETAMINOPHEN 650 MG: 325 TABLET ORAL at 04:40

## 2023-01-01 RX ADMIN — Medication 10 ML: at 20:27

## 2023-01-01 RX ADMIN — ANTICOAGULANT SODIUM CITRATE SOLUTION: 4 SOLUTION INTRAVENOUS at 00:56

## 2023-01-01 RX ADMIN — IPRATROPIUM BROMIDE AND ALBUTEROL SULFATE 3 ML: .5; 2.5 SOLUTION RESPIRATORY (INHALATION) at 08:18

## 2023-01-01 RX ADMIN — CHLORHEXIDINE GLUCONATE 0.12% ORAL RINSE 15 ML: 1.2 LIQUID ORAL at 21:19

## 2023-01-01 RX ADMIN — GABAPENTIN 200 MG: 100 CAPSULE ORAL at 08:38

## 2023-01-01 RX ADMIN — POTASSIUM BICARBONATE 40 MEQ: 782 TABLET, EFFERVESCENT ORAL at 11:00

## 2023-01-01 RX ADMIN — CALCIUM CHLORIDE INJECTION 8000 MG: 100 INJECTION, SOLUTION INTRAVENOUS at 12:29

## 2023-01-01 RX ADMIN — NYSTATIN 500000 UNITS: 100000 SUSPENSION ORAL at 11:27

## 2023-01-01 RX ADMIN — SODIUM PHOSPHATE, MONOBASIC, MONOHYDRATE AND SODIUM PHOSPHATE, DIBASIC, ANHYDROUS 6 MMOL: 276; 142 INJECTION, SOLUTION INTRAVENOUS at 20:40

## 2023-01-01 RX ADMIN — CHLORHEXIDINE GLUCONATE 0.12% ORAL RINSE 15 ML: 1.2 LIQUID ORAL at 08:17

## 2023-01-01 RX ADMIN — ALBUMIN (HUMAN) 25 G: 0.25 INJECTION, SOLUTION INTRAVENOUS at 12:55

## 2023-01-01 RX ADMIN — MULTIPLE VITAMINS W/ MINERALS TAB 1 TABLET: TAB at 09:28

## 2023-01-01 RX ADMIN — PIPERACILLIN AND TAZOBACTAM 4500 MG: 4; .5 INJECTION, POWDER, LYOPHILIZED, FOR SOLUTION INTRAVENOUS at 06:15

## 2023-01-01 RX ADMIN — CHLORHEXIDINE GLUCONATE 0.12% ORAL RINSE 15 ML: 1.2 LIQUID ORAL at 08:11

## 2023-01-01 RX ADMIN — Medication: at 13:18

## 2023-01-01 RX ADMIN — NYSTATIN 500000 UNITS: 100000 SUSPENSION ORAL at 13:17

## 2023-01-01 RX ADMIN — BUMETANIDE 2 MG/HR: 0.25 INJECTION INTRAMUSCULAR; INTRAVENOUS at 17:02

## 2023-01-01 RX ADMIN — GABAPENTIN 200 MG: 100 CAPSULE ORAL at 22:03

## 2023-01-01 RX ADMIN — Medication: at 11:05

## 2023-01-01 RX ADMIN — CHLORHEXIDINE GLUCONATE 0.12% ORAL RINSE 15 ML: 1.2 LIQUID ORAL at 07:37

## 2023-01-01 RX ADMIN — Medication: at 16:13

## 2023-01-01 RX ADMIN — RIFAXIMIN 550 MG: 550 TABLET ORAL at 21:12

## 2023-01-01 RX ADMIN — SENNOSIDES 15 ML: 8.8 SYRUP ORAL at 09:29

## 2023-01-01 RX ADMIN — APIXABAN 2.5 MG: 2.5 TABLET, FILM COATED ORAL at 08:36

## 2023-01-01 RX ADMIN — IPRATROPIUM BROMIDE AND ALBUTEROL SULFATE 3 ML: .5; 2.5 SOLUTION RESPIRATORY (INHALATION) at 13:55

## 2023-01-01 RX ADMIN — ANTICOAGULANT SODIUM CITRATE SOLUTION: 4 SOLUTION INTRAVENOUS at 14:54

## 2023-01-01 RX ADMIN — FUROSEMIDE 60 MG: 10 INJECTION, SOLUTION INTRAMUSCULAR; INTRAVENOUS at 18:32

## 2023-01-01 RX ADMIN — ANTICOAGULANT SODIUM CITRATE SOLUTION: 4 SOLUTION INTRAVENOUS at 06:43

## 2023-01-01 RX ADMIN — PIPERACILLIN AND TAZOBACTAM 4500 MG: 4; .5 INJECTION, POWDER, LYOPHILIZED, FOR SOLUTION INTRAVENOUS at 05:45

## 2023-01-01 RX ADMIN — NYSTATIN 500000 UNITS: 100000 SUSPENSION ORAL at 17:56

## 2023-01-01 RX ADMIN — ARFORMOTEROL TARTRATE 15 MCG: 15 SOLUTION RESPIRATORY (INHALATION) at 08:15

## 2023-01-01 RX ADMIN — PANTOPRAZOLE SODIUM 40 MG: 40 INJECTION, POWDER, FOR SOLUTION INTRAVENOUS at 08:46

## 2023-01-01 RX ADMIN — METOPROLOL SUCCINATE 50 MG: 50 TABLET, EXTENDED RELEASE ORAL at 08:37

## 2023-01-01 RX ADMIN — GUAIFENESIN 200 MG: 200 SOLUTION ORAL at 20:06

## 2023-01-01 RX ADMIN — NYSTATIN 500000 UNITS: 100000 SUSPENSION ORAL at 09:21

## 2023-01-01 RX ADMIN — HYDRALAZINE HYDROCHLORIDE 25 MG: 25 TABLET, FILM COATED ORAL at 05:10

## 2023-01-01 RX ADMIN — DEXTROSE AND SODIUM CHLORIDE: 5; 200 INJECTION, SOLUTION INTRAVENOUS at 23:12

## 2023-01-01 RX ADMIN — NYSTATIN 500000 UNITS: 100000 SUSPENSION ORAL at 17:27

## 2023-01-01 RX ADMIN — PIPERACILLIN AND TAZOBACTAM 4500 MG: 4; .5 INJECTION, POWDER, LYOPHILIZED, FOR SOLUTION INTRAVENOUS at 11:55

## 2023-01-01 RX ADMIN — HYDRALAZINE HYDROCHLORIDE 25 MG: 25 TABLET, FILM COATED ORAL at 22:14

## 2023-01-01 RX ADMIN — CALCIUM CHLORIDE INJECTION 8000 MG: 100 INJECTION, SOLUTION INTRAVENOUS at 17:31

## 2023-01-01 RX ADMIN — HYDROCORTISONE SODIUM SUCCINATE 50 MG: 100 INJECTION, POWDER, FOR SOLUTION INTRAMUSCULAR; INTRAVENOUS at 21:59

## 2023-01-01 RX ADMIN — MIDODRINE HYDROCHLORIDE 10 MG: 10 TABLET ORAL at 07:37

## 2023-01-01 RX ADMIN — LACTULOSE 20 G: 20 SOLUTION ORAL at 14:50

## 2023-01-01 RX ADMIN — BUDESONIDE 500 MCG: 0.5 SUSPENSION RESPIRATORY (INHALATION) at 20:54

## 2023-01-01 RX ADMIN — CHLORHEXIDINE GLUCONATE 0.12% ORAL RINSE 15 ML: 1.2 LIQUID ORAL at 08:38

## 2023-01-01 RX ADMIN — LACTULOSE 20 G: 20 SOLUTION ORAL at 21:12

## 2023-01-01 RX ADMIN — LEVETIRACETAM 1000 MG: 100 INJECTION INTRAVENOUS at 10:14

## 2023-01-01 RX ADMIN — DEXMEDETOMIDINE 1.2 MCG/KG/HR: 100 INJECTION, SOLUTION INTRAVENOUS at 22:06

## 2023-01-01 RX ADMIN — GLYCOPYRROLATE 0.2 MG: 0.2 INJECTION INTRAMUSCULAR; INTRAVENOUS at 19:44

## 2023-01-01 RX ADMIN — BUDESONIDE 500 MCG: 0.5 SUSPENSION RESPIRATORY (INHALATION) at 08:47

## 2023-01-01 RX ADMIN — ARFORMOTEROL TARTRATE 15 MCG: 15 SOLUTION RESPIRATORY (INHALATION) at 21:04

## 2023-01-01 RX ADMIN — NYSTATIN 500000 UNITS: 100000 SUSPENSION ORAL at 20:57

## 2023-01-01 RX ADMIN — INSULIN GLARGINE-YFGN 25 UNITS: 100 INJECTION, SOLUTION SUBCUTANEOUS at 20:29

## 2023-01-01 RX ADMIN — IPRATROPIUM BROMIDE AND ALBUTEROL SULFATE 3 ML: .5; 2.5 SOLUTION RESPIRATORY (INHALATION) at 12:46

## 2023-01-01 RX ADMIN — HYDRALAZINE HYDROCHLORIDE 25 MG: 25 TABLET, FILM COATED ORAL at 22:03

## 2023-01-01 RX ADMIN — Medication 10 ML: at 09:28

## 2023-01-01 RX ADMIN — RIFAXIMIN 550 MG: 550 TABLET ORAL at 19:52

## 2023-01-01 RX ADMIN — DEXMEDETOMIDINE 1.5 MCG/KG/HR: 100 INJECTION, SOLUTION INTRAVENOUS at 06:10

## 2023-01-01 RX ADMIN — BUDESONIDE 500 MCG: 0.5 SUSPENSION RESPIRATORY (INHALATION) at 07:56

## 2023-01-01 RX ADMIN — LACTULOSE 20 G: 20 SOLUTION ORAL at 14:27

## 2023-01-01 RX ADMIN — ALBUMIN (HUMAN) 25 G: 0.25 INJECTION, SOLUTION INTRAVENOUS at 06:14

## 2023-01-01 RX ADMIN — NYSTATIN 500000 UNITS: 100000 SUSPENSION ORAL at 11:55

## 2023-01-01 RX ADMIN — BISACODYL 10 MG: 10 SUPPOSITORY RECTAL at 10:57

## 2023-01-01 RX ADMIN — ARFORMOTEROL TARTRATE 15 MCG: 15 SOLUTION RESPIRATORY (INHALATION) at 19:27

## 2023-01-01 RX ADMIN — LACTULOSE 20 G: 20 SOLUTION ORAL at 08:29

## 2023-01-01 RX ADMIN — RIFAXIMIN 550 MG: 550 TABLET ORAL at 20:46

## 2023-01-01 RX ADMIN — PIPERACILLIN AND TAZOBACTAM 4500 MG: 4; .5 INJECTION, POWDER, LYOPHILIZED, FOR SOLUTION INTRAVENOUS at 21:00

## 2023-01-01 RX ADMIN — PETROLATUM: 420 OINTMENT TOPICAL at 08:30

## 2023-01-01 RX ADMIN — RIFAXIMIN 550 MG: 550 TABLET ORAL at 22:13

## 2023-01-01 RX ADMIN — Medication 6 MCG/MIN: at 14:30

## 2023-01-01 RX ADMIN — DEXMEDETOMIDINE 1.5 MCG/KG/HR: 100 INJECTION, SOLUTION INTRAVENOUS at 00:20

## 2023-01-01 RX ADMIN — MIDODRINE HYDROCHLORIDE 10 MG: 10 TABLET ORAL at 09:29

## 2023-01-01 RX ADMIN — BUMETANIDE 1 MG/HR: 0.25 INJECTION INTRAMUSCULAR; INTRAVENOUS at 00:29

## 2023-01-01 RX ADMIN — FENTANYL CITRATE 25 MCG: 50 INJECTION, SOLUTION INTRAMUSCULAR; INTRAVENOUS at 06:52

## 2023-01-01 RX ADMIN — ARFORMOTEROL TARTRATE 15 MCG: 15 SOLUTION RESPIRATORY (INHALATION) at 19:57

## 2023-01-01 RX ADMIN — GABAPENTIN 200 MG: 100 CAPSULE ORAL at 08:37

## 2023-01-01 RX ADMIN — IPRATROPIUM BROMIDE AND ALBUTEROL SULFATE 3 ML: .5; 2.5 SOLUTION RESPIRATORY (INHALATION) at 09:06

## 2023-01-01 RX ADMIN — BUDESONIDE 500 MCG: 0.5 SUSPENSION RESPIRATORY (INHALATION) at 08:08

## 2023-01-01 RX ADMIN — Medication 4 MG/HR: at 12:59

## 2023-01-01 RX ADMIN — ATORVASTATIN CALCIUM 40 MG: 40 TABLET, FILM COATED ORAL at 20:46

## 2023-01-01 RX ADMIN — DEXMEDETOMIDINE 1.5 MCG/KG/HR: 100 INJECTION, SOLUTION INTRAVENOUS at 06:08

## 2023-01-01 RX ADMIN — NYSTATIN 500000 UNITS: 100000 SUSPENSION ORAL at 16:15

## 2023-01-01 RX ADMIN — IPRATROPIUM BROMIDE AND ALBUTEROL SULFATE 3 ML: 2.5; .5 SOLUTION RESPIRATORY (INHALATION) at 08:30

## 2023-01-01 RX ADMIN — LEVETIRACETAM 250 MG: 100 INJECTION INTRAVENOUS at 09:47

## 2023-01-01 RX ADMIN — ONDANSETRON 4 MG: 2 INJECTION INTRAMUSCULAR; INTRAVENOUS at 05:37

## 2023-01-01 RX ADMIN — BUMETANIDE 2 MG: 0.25 INJECTION, SOLUTION INTRAMUSCULAR; INTRAVENOUS at 08:30

## 2023-01-01 RX ADMIN — ALBUTEROL SULFATE 2.5 MG: 2.5 SOLUTION RESPIRATORY (INHALATION) at 19:46

## 2023-01-01 RX ADMIN — NYSTATIN 500000 UNITS: 100000 SUSPENSION ORAL at 17:36

## 2023-01-01 RX ADMIN — IRON SUCROSE 100 MG: 20 INJECTION, SOLUTION INTRAVENOUS at 13:43

## 2023-01-01 RX ADMIN — BUDESONIDE 500 MCG: 0.5 SUSPENSION RESPIRATORY (INHALATION) at 12:26

## 2023-01-01 RX ADMIN — SENNOSIDES 15 ML: 8.8 SYRUP ORAL at 20:28

## 2023-01-01 RX ADMIN — CALCIUM CHLORIDE INJECTION 8000 MG: 100 INJECTION, SOLUTION INTRAVENOUS at 10:18

## 2023-01-01 RX ADMIN — DEXMEDETOMIDINE 1 MCG/KG/HR: 100 INJECTION, SOLUTION INTRAVENOUS at 03:19

## 2023-01-01 RX ADMIN — ACETAMINOPHEN 650 MG: 325 TABLET ORAL at 00:01

## 2023-01-01 RX ADMIN — NYSTATIN 500000 UNITS: 100000 SUSPENSION ORAL at 08:11

## 2023-01-01 RX ADMIN — NYSTATIN 500000 UNITS: 100000 SUSPENSION ORAL at 08:26

## 2023-01-01 RX ADMIN — MULTIPLE VITAMINS W/ MINERALS TAB 1 TABLET: TAB at 08:24

## 2023-01-01 RX ADMIN — ASPIRIN 81 MG 81 MG: 81 TABLET ORAL at 08:30

## 2023-01-01 RX ADMIN — FENTANYL CITRATE 25 MCG: 50 INJECTION, SOLUTION INTRAMUSCULAR; INTRAVENOUS at 21:15

## 2023-01-01 RX ADMIN — ISOSORBIDE MONONITRATE 30 MG: 30 TABLET, EXTENDED RELEASE ORAL at 08:31

## 2023-01-01 RX ADMIN — EPOETIN ALFA-EPBX 3000 UNITS: 3000 INJECTION, SOLUTION INTRAVENOUS; SUBCUTANEOUS at 08:35

## 2023-01-01 RX ADMIN — DEXMEDETOMIDINE 0.2 MCG/KG/HR: 100 INJECTION, SOLUTION INTRAVENOUS at 06:50

## 2023-01-01 RX ADMIN — Medication 6000 UNITS: at 11:07

## 2023-01-01 RX ADMIN — ARFORMOTEROL TARTRATE 15 MCG: 15 SOLUTION RESPIRATORY (INHALATION) at 08:47

## 2023-01-01 RX ADMIN — PETROLATUM: 420 OINTMENT TOPICAL at 21:43

## 2023-01-01 RX ADMIN — PANTOPRAZOLE SODIUM 40 MG: 40 INJECTION, POWDER, FOR SOLUTION INTRAVENOUS at 09:28

## 2023-01-01 RX ADMIN — PETROLATUM: 420 OINTMENT TOPICAL at 20:26

## 2023-01-01 RX ADMIN — PANTOPRAZOLE SODIUM 40 MG: 40 INJECTION, POWDER, FOR SOLUTION INTRAVENOUS at 08:27

## 2023-01-01 RX ADMIN — LACTULOSE 20 G: 20 SOLUTION ORAL at 13:39

## 2023-01-01 RX ADMIN — ARFORMOTEROL TARTRATE 15 MCG: 15 SOLUTION RESPIRATORY (INHALATION) at 19:55

## 2023-01-01 RX ADMIN — IPRATROPIUM BROMIDE AND ALBUTEROL SULFATE 3 ML: .5; 2.5 SOLUTION RESPIRATORY (INHALATION) at 20:13

## 2023-01-01 RX ADMIN — PHENYLEPHRINE HYDROCHLORIDE 125 MCG/MIN: 10 INJECTION, SOLUTION INTRAMUSCULAR; INTRAVENOUS; SUBCUTANEOUS at 14:23

## 2023-01-01 RX ADMIN — MULTIPLE VITAMINS W/ MINERALS TAB 1 TABLET: TAB at 08:35

## 2023-01-01 RX ADMIN — BUDESONIDE 500 MCG: 0.5 SUSPENSION RESPIRATORY (INHALATION) at 20:02

## 2023-01-01 RX ADMIN — PIPERACILLIN AND TAZOBACTAM 4500 MG: 4; .5 INJECTION, POWDER, LYOPHILIZED, FOR SOLUTION INTRAVENOUS at 14:58

## 2023-01-01 RX ADMIN — ISOSORBIDE MONONITRATE 30 MG: 30 TABLET, EXTENDED RELEASE ORAL at 08:40

## 2023-01-01 RX ADMIN — APIXABAN 2.5 MG: 2.5 TABLET, FILM COATED ORAL at 08:37

## 2023-01-01 RX ADMIN — GABAPENTIN 200 MG: 100 CAPSULE ORAL at 14:55

## 2023-01-01 RX ADMIN — CHLORHEXIDINE GLUCONATE 0.12% ORAL RINSE 15 ML: 1.2 LIQUID ORAL at 20:54

## 2023-01-01 RX ADMIN — ANTICOAGULANT SODIUM CITRATE SOLUTION: 4 SOLUTION INTRAVENOUS at 18:08

## 2023-01-01 RX ADMIN — PIPERACILLIN AND TAZOBACTAM 3375 MG: 3; .375 INJECTION, POWDER, FOR SOLUTION INTRAVENOUS at 07:52

## 2023-01-01 RX ADMIN — PANTOPRAZOLE SODIUM 40 MG: 40 TABLET, DELAYED RELEASE ORAL at 06:46

## 2023-01-01 RX ADMIN — LACTULOSE 20 G: 20 SOLUTION ORAL at 12:28

## 2023-01-01 RX ADMIN — PANTOPRAZOLE SODIUM 40 MG: 40 INJECTION, POWDER, FOR SOLUTION INTRAVENOUS at 20:06

## 2023-01-01 RX ADMIN — MULTIPLE VITAMINS W/ MINERALS TAB 1 TABLET: TAB at 08:53

## 2023-01-01 RX ADMIN — Medication 10 ML: at 21:37

## 2023-01-01 RX ADMIN — IPRATROPIUM BROMIDE AND ALBUTEROL SULFATE 3 ML: .5; 2.5 SOLUTION RESPIRATORY (INHALATION) at 08:15

## 2023-01-01 RX ADMIN — SODIUM PHOSPHATE, MONOBASIC, MONOHYDRATE AND SODIUM PHOSPHATE, DIBASIC, ANHYDROUS 6 MMOL: 276; 142 INJECTION, SOLUTION INTRAVENOUS at 06:19

## 2023-01-01 RX ADMIN — METOPROLOL SUCCINATE 50 MG: 50 TABLET, EXTENDED RELEASE ORAL at 08:53

## 2023-01-01 RX ADMIN — PANTOPRAZOLE SODIUM 40 MG: 40 INJECTION, POWDER, FOR SOLUTION INTRAVENOUS at 20:57

## 2023-01-01 RX ADMIN — GABAPENTIN 200 MG: 100 CAPSULE ORAL at 20:53

## 2023-01-01 RX ADMIN — METOPROLOL SUCCINATE 50 MG: 50 TABLET, EXTENDED RELEASE ORAL at 08:22

## 2023-01-01 RX ADMIN — ARFORMOTEROL TARTRATE 15 MCG: 15 SOLUTION RESPIRATORY (INHALATION) at 07:59

## 2023-01-01 RX ADMIN — DOXAZOSIN 2 MG: 2 TABLET ORAL at 08:58

## 2023-01-01 RX ADMIN — ACETAMINOPHEN 650 MG: 325 TABLET ORAL at 00:44

## 2023-01-01 RX ADMIN — PETROLATUM: 420 OINTMENT TOPICAL at 00:31

## 2023-01-01 RX ADMIN — IPRATROPIUM BROMIDE AND ALBUTEROL SULFATE 3 ML: 2.5; .5 SOLUTION RESPIRATORY (INHALATION) at 08:05

## 2023-01-01 RX ADMIN — PETROLATUM: 420 OINTMENT TOPICAL at 08:40

## 2023-01-01 RX ADMIN — SODIUM PHOSPHATE, MONOBASIC, MONOHYDRATE AND SODIUM PHOSPHATE, DIBASIC, ANHYDROUS 6 MMOL: 276; 142 INJECTION, SOLUTION INTRAVENOUS at 14:12

## 2023-01-01 RX ADMIN — PETROLATUM: 420 OINTMENT TOPICAL at 09:29

## 2023-01-01 RX ADMIN — Medication 10 ML: at 20:58

## 2023-01-01 RX ADMIN — MORPHINE SULFATE 4 MG: 4 INJECTION, SOLUTION INTRAMUSCULAR; INTRAVENOUS at 14:21

## 2023-01-01 RX ADMIN — FENTANYL CITRATE 25 MCG: 50 INJECTION, SOLUTION INTRAMUSCULAR; INTRAVENOUS at 22:07

## 2023-01-01 RX ADMIN — BUDESONIDE 500 MCG: 0.5 SUSPENSION RESPIRATORY (INHALATION) at 20:07

## 2023-01-01 RX ADMIN — ARFORMOTEROL TARTRATE 15 MCG: 15 SOLUTION RESPIRATORY (INHALATION) at 20:02

## 2023-01-01 RX ADMIN — MIDODRINE HYDROCHLORIDE 10 MG: 10 TABLET ORAL at 08:32

## 2023-01-01 RX ADMIN — HYDRALAZINE HYDROCHLORIDE 25 MG: 25 TABLET, FILM COATED ORAL at 20:53

## 2023-01-01 RX ADMIN — PANTOPRAZOLE SODIUM 40 MG: 40 INJECTION, POWDER, FOR SOLUTION INTRAVENOUS at 20:45

## 2023-01-01 RX ADMIN — EMPAGLIFLOZIN 10 MG: 10 TABLET, FILM COATED ORAL at 05:48

## 2023-01-01 RX ADMIN — NYSTATIN 500000 UNITS: 100000 SUSPENSION ORAL at 17:15

## 2023-01-01 RX ADMIN — RIFAXIMIN 550 MG: 550 TABLET ORAL at 20:05

## 2023-01-01 RX ADMIN — SUCCINYLCHOLINE CHLORIDE 200 MG: 20 INJECTION, SOLUTION INTRAMUSCULAR; INTRAVENOUS at 06:20

## 2023-01-01 RX ADMIN — ALBUMIN (HUMAN) 25 G: 0.25 INJECTION, SOLUTION INTRAVENOUS at 12:12

## 2023-01-01 RX ADMIN — RIFAXIMIN 550 MG: 550 TABLET ORAL at 21:17

## 2023-01-01 RX ADMIN — Medication 10 ML: at 08:48

## 2023-01-01 RX ADMIN — RIFAXIMIN 550 MG: 550 TABLET ORAL at 11:12

## 2023-01-01 RX ADMIN — Medication 10 ML: at 09:00

## 2023-01-01 RX ADMIN — POTASSIUM BICARBONATE 40 MEQ: 782 TABLET, EFFERVESCENT ORAL at 23:14

## 2023-01-01 RX ADMIN — SODIUM PHOSPHATE, MONOBASIC, MONOHYDRATE AND SODIUM PHOSPHATE, DIBASIC, ANHYDROUS 6 MMOL: 276; 142 INJECTION, SOLUTION INTRAVENOUS at 15:26

## 2023-01-01 RX ADMIN — Medication 125 MCG/HR: at 16:39

## 2023-01-01 RX ADMIN — PIPERACILLIN AND TAZOBACTAM 4500 MG: 4; .5 INJECTION, POWDER, LYOPHILIZED, FOR SOLUTION INTRAVENOUS at 14:45

## 2023-01-01 RX ADMIN — SODIUM PHOSPHATE, MONOBASIC, MONOHYDRATE AND SODIUM PHOSPHATE, DIBASIC, ANHYDROUS 6 MMOL: 276; 142 INJECTION, SOLUTION INTRAVENOUS at 08:40

## 2023-01-01 RX ADMIN — POTASSIUM PHOSPHATE, MONOBASIC AND POTASSIUM PHOSPHATE, DIBASIC 10 MMOL: 224; 236 INJECTION, SOLUTION, CONCENTRATE INTRAVENOUS at 14:27

## 2023-01-01 RX ADMIN — ARFORMOTEROL TARTRATE 15 MCG: 15 SOLUTION RESPIRATORY (INHALATION) at 08:19

## 2023-01-01 RX ADMIN — IPRATROPIUM BROMIDE AND ALBUTEROL SULFATE 3 ML: .5; 2.5 SOLUTION RESPIRATORY (INHALATION) at 07:55

## 2023-01-01 RX ADMIN — MULTIPLE VITAMINS W/ MINERALS TAB 1 TABLET: TAB at 08:30

## 2023-01-01 RX ADMIN — GABAPENTIN 200 MG: 100 CAPSULE ORAL at 15:43

## 2023-01-01 RX ADMIN — LEVETIRACETAM 250 MG: 100 INJECTION INTRAVENOUS at 23:00

## 2023-01-01 RX ADMIN — Medication 10 ML: at 08:04

## 2023-01-01 RX ADMIN — SODIUM PHOSPHATE, MONOBASIC, MONOHYDRATE AND SODIUM PHOSPHATE, DIBASIC, ANHYDROUS 6 MMOL: 276; 142 INJECTION, SOLUTION INTRAVENOUS at 19:47

## 2023-01-01 RX ADMIN — GABAPENTIN 200 MG: 100 CAPSULE ORAL at 21:29

## 2023-01-01 RX ADMIN — GABAPENTIN 200 MG: 100 CAPSULE ORAL at 21:41

## 2023-01-01 RX ADMIN — DIGOXIN 250 MCG: 0.25 INJECTION INTRAMUSCULAR; INTRAVENOUS at 21:42

## 2023-01-01 RX ADMIN — PETROLATUM: 420 OINTMENT TOPICAL at 20:46

## 2023-01-01 RX ADMIN — Medication 50 MCG/HR: at 06:49

## 2023-01-01 RX ADMIN — DEXTROSE AND SODIUM CHLORIDE: 5; 200 INJECTION, SOLUTION INTRAVENOUS at 00:02

## 2023-01-01 RX ADMIN — LACTULOSE 20 G: 20 SOLUTION ORAL at 16:10

## 2023-01-01 RX ADMIN — PANTOPRAZOLE SODIUM 40 MG: 40 INJECTION, POWDER, FOR SOLUTION INTRAVENOUS at 11:25

## 2023-01-01 RX ADMIN — IOPAMIDOL 75 ML: 755 INJECTION, SOLUTION INTRAVENOUS at 13:53

## 2023-01-01 RX ADMIN — ACETAMINOPHEN 650 MG: 325 TABLET ORAL at 20:53

## 2023-01-01 RX ADMIN — LACTULOSE 20 G: 20 SOLUTION ORAL at 08:31

## 2023-01-01 RX ADMIN — METOPROLOL SUCCINATE 50 MG: 50 TABLET, EXTENDED RELEASE ORAL at 08:32

## 2023-01-01 RX ADMIN — SODIUM CHLORIDE: 4.5 INJECTION, SOLUTION INTRAVENOUS at 00:10

## 2023-01-01 RX ADMIN — BISACODYL 10 MG: 10 SUPPOSITORY RECTAL at 11:28

## 2023-01-01 RX ADMIN — POLYETHYLENE GLYCOL 3350 17 G: 17 POWDER, FOR SOLUTION ORAL at 08:22

## 2023-01-01 RX ADMIN — ANTICOAGULANT SODIUM CITRATE SOLUTION: 4 SOLUTION INTRAVENOUS at 21:08

## 2023-01-01 RX ADMIN — Medication 10 ML: at 21:13

## 2023-01-01 RX ADMIN — NYSTATIN 500000 UNITS: 100000 SUSPENSION ORAL at 19:52

## 2023-01-01 RX ADMIN — DEXTROSE AND SODIUM CHLORIDE: 5; 200 INJECTION, SOLUTION INTRAVENOUS at 12:24

## 2023-01-01 RX ADMIN — PIPERACILLIN AND TAZOBACTAM 4500 MG: 4; .5 INJECTION, POWDER, LYOPHILIZED, FOR SOLUTION INTRAVENOUS at 22:04

## 2023-01-01 RX ADMIN — BUDESONIDE 500 MCG: 0.5 SUSPENSION RESPIRATORY (INHALATION) at 19:47

## 2023-01-01 RX ADMIN — ANTICOAGULANT SODIUM CITRATE SOLUTION: 4 SOLUTION INTRAVENOUS at 00:18

## 2023-01-01 RX ADMIN — Medication 10 ML: at 20:45

## 2023-01-01 RX ADMIN — ACETAMINOPHEN 650 MG: 325 TABLET ORAL at 08:33

## 2023-01-01 RX ADMIN — IPRATROPIUM BROMIDE AND ALBUTEROL SULFATE 3 ML: .5; 2.5 SOLUTION RESPIRATORY (INHALATION) at 21:04

## 2023-01-01 RX ADMIN — ASPIRIN 81 MG 81 MG: 81 TABLET ORAL at 08:38

## 2023-01-01 RX ADMIN — RIFAXIMIN 550 MG: 550 TABLET ORAL at 08:23

## 2023-01-01 RX ADMIN — NYSTATIN 500000 UNITS: 100000 SUSPENSION ORAL at 13:55

## 2023-01-01 RX ADMIN — DEXTROSE MONOHYDRATE 125 ML: 100 INJECTION, SOLUTION INTRAVENOUS at 15:44

## 2023-01-01 RX ADMIN — BUMETANIDE 2 MG/HR: 0.25 INJECTION INTRAMUSCULAR; INTRAVENOUS at 09:00

## 2023-01-01 RX ADMIN — PANTOPRAZOLE SODIUM 40 MG: 40 TABLET, DELAYED RELEASE ORAL at 07:05

## 2023-01-01 RX ADMIN — LACTULOSE 20 G: 20 SOLUTION ORAL at 14:57

## 2023-01-01 RX ADMIN — Medication 75 MCG/HR: at 14:23

## 2023-01-01 RX ADMIN — ANTICOAGULANT SODIUM CITRATE SOLUTION: 4 SOLUTION INTRAVENOUS at 20:09

## 2023-01-01 RX ADMIN — PIPERACILLIN AND TAZOBACTAM 4500 MG: 4; .5 INJECTION, POWDER, LYOPHILIZED, FOR SOLUTION INTRAVENOUS at 20:59

## 2023-01-01 RX ADMIN — PETROLATUM: 420 OINTMENT TOPICAL at 21:39

## 2023-01-01 RX ADMIN — FENTANYL CITRATE 25 MCG: 50 INJECTION, SOLUTION INTRAMUSCULAR; INTRAVENOUS at 22:55

## 2023-01-01 RX ADMIN — CALCIUM CHLORIDE INJECTION 8000 MG: 100 INJECTION, SOLUTION INTRAVENOUS at 22:58

## 2023-01-01 RX ADMIN — MULTIPLE VITAMINS W/ MINERALS TAB 1 TABLET: TAB at 09:29

## 2023-01-01 RX ADMIN — PETROLATUM: 420 OINTMENT TOPICAL at 20:58

## 2023-01-01 RX ADMIN — Medication 4 ML: at 08:22

## 2023-01-01 RX ADMIN — Medication 10 ML: at 08:39

## 2023-01-01 RX ADMIN — RIFAXIMIN 550 MG: 550 TABLET ORAL at 20:18

## 2023-01-01 RX ADMIN — LACTULOSE 20 G: 20 SOLUTION ORAL at 21:37

## 2023-01-01 RX ADMIN — GLUCAGON 1 MG: KIT at 11:31

## 2023-01-01 RX ADMIN — GABAPENTIN 200 MG: 100 CAPSULE ORAL at 14:47

## 2023-01-01 RX ADMIN — DEXMEDETOMIDINE 1.5 MCG/KG/HR: 100 INJECTION, SOLUTION INTRAVENOUS at 23:45

## 2023-01-01 RX ADMIN — FUROSEMIDE 40 MG: 10 INJECTION, SOLUTION INTRAMUSCULAR; INTRAVENOUS at 09:29

## 2023-01-01 RX ADMIN — ANTICOAGULANT SODIUM CITRATE SOLUTION: 4 SOLUTION INTRAVENOUS at 14:20

## 2023-01-01 RX ADMIN — SODIUM CHLORIDE 125 MG: 9 INJECTION, SOLUTION INTRAVENOUS at 11:30

## 2023-01-01 RX ADMIN — PHENYLEPHRINE HYDROCHLORIDE 150 MCG/MIN: 10 INJECTION, SOLUTION INTRAMUSCULAR; INTRAVENOUS; SUBCUTANEOUS at 22:36

## 2023-01-01 RX ADMIN — POLYETHYLENE GLYCOL 3350 17 G: 17 POWDER, FOR SOLUTION ORAL at 20:55

## 2023-01-01 RX ADMIN — BUMETANIDE 2 MG/HR: 0.25 INJECTION INTRAMUSCULAR; INTRAVENOUS at 08:03

## 2023-01-01 RX ADMIN — ACETAMINOPHEN 650 MG: 650 SUPPOSITORY RECTAL at 15:43

## 2023-01-01 RX ADMIN — PANTOPRAZOLE SODIUM 40 MG: 40 INJECTION, POWDER, FOR SOLUTION INTRAVENOUS at 21:41

## 2023-01-01 RX ADMIN — HYDROCORTISONE SODIUM SUCCINATE 50 MG: 100 INJECTION, POWDER, FOR SOLUTION INTRAMUSCULAR; INTRAVENOUS at 08:36

## 2023-01-01 RX ADMIN — Medication 10 ML: at 19:52

## 2023-01-01 RX ADMIN — PANTOPRAZOLE SODIUM 40 MG: 40 INJECTION, POWDER, FOR SOLUTION INTRAVENOUS at 08:38

## 2023-01-01 RX ADMIN — BUMETANIDE 2 MG/HR: 0.25 INJECTION INTRAMUSCULAR; INTRAVENOUS at 13:49

## 2023-01-01 RX ADMIN — RIFAXIMIN 550 MG: 550 TABLET ORAL at 21:37

## 2023-01-01 RX ADMIN — ANTICOAGULANT SODIUM CITRATE SOLUTION: 4 SOLUTION INTRAVENOUS at 18:38

## 2023-01-01 RX ADMIN — ASPIRIN 81 MG 81 MG: 81 TABLET ORAL at 08:35

## 2023-01-01 RX ADMIN — Medication 10 ML: at 21:55

## 2023-01-01 RX ADMIN — BUDESONIDE 500 MCG: 0.5 SUSPENSION RESPIRATORY (INHALATION) at 19:57

## 2023-01-01 RX ADMIN — MIDODRINE HYDROCHLORIDE 10 MG: 10 TABLET ORAL at 08:24

## 2023-01-01 RX ADMIN — DEXMEDETOMIDINE 1.5 MCG/KG/HR: 100 INJECTION, SOLUTION INTRAVENOUS at 22:50

## 2023-01-01 RX ADMIN — MULTIPLE VITAMINS W/ MINERALS TAB 1 TABLET: TAB at 08:23

## 2023-01-01 RX ADMIN — MULTIPLE VITAMINS W/ MINERALS TAB 1 TABLET: TAB at 08:29

## 2023-01-01 RX ADMIN — PETROLATUM: 420 OINTMENT TOPICAL at 21:20

## 2023-01-01 RX ADMIN — GABAPENTIN 200 MG: 100 CAPSULE ORAL at 13:45

## 2023-01-01 RX ADMIN — BUMETANIDE 2 MG: 0.25 INJECTION, SOLUTION INTRAMUSCULAR; INTRAVENOUS at 08:40

## 2023-01-01 ASSESSMENT — PULMONARY FUNCTION TESTS
PIF_VALUE: 31
PIF_VALUE: 26
PIF_VALUE: 27
PIF_VALUE: 26
PIF_VALUE: 25
PIF_VALUE: 30
PIF_VALUE: 26
PIF_VALUE: 18
PIF_VALUE: 51
PIF_VALUE: 27
PIF_VALUE: 26
PIF_VALUE: 26
PIF_VALUE: 22
PIF_VALUE: 28
PIF_VALUE: 32
PIF_VALUE: 24
PIF_VALUE: 1
PIF_VALUE: 25
PIF_VALUE: 27
PIF_VALUE: 29
PIF_VALUE: 25
PIF_VALUE: 30
PIF_VALUE: 28
PIF_VALUE: 31
PIF_VALUE: 26
PIF_VALUE: 29
PIF_VALUE: 28
PIF_VALUE: 31
PIF_VALUE: 24
PIF_VALUE: 30
PIF_VALUE: 27
PIF_VALUE: 28
PIF_VALUE: 34
PIF_VALUE: 28
PIF_VALUE: 30
PIF_VALUE: 34
PIF_VALUE: 29
PIF_VALUE: 30
PIF_VALUE: 25
PIF_VALUE: 30
PIF_VALUE: 32
PIF_VALUE: 33
PIF_VALUE: 36
PIF_VALUE: 45
PIF_VALUE: 36
PIF_VALUE: 27
PIF_VALUE: 27
PIF_VALUE: 24
PIF_VALUE: 25
PIF_VALUE: 31
PIF_VALUE: 28
PIF_VALUE: 29
PIF_VALUE: 31
PIF_VALUE: 28
PIF_VALUE: 32
PIF_VALUE: 28
PIF_VALUE: 45
PIF_VALUE: 29
PIF_VALUE: 32
PIF_VALUE: 24
PIF_VALUE: 28
PIF_VALUE: 22
PIF_VALUE: 30
PIF_VALUE: 49
PIF_VALUE: 41
PIF_VALUE: 31
PIF_VALUE: 27
PIF_VALUE: 25
PIF_VALUE: 22
PIF_VALUE: 31
PIF_VALUE: 28
PIF_VALUE: 26
PIF_VALUE: 24
PIF_VALUE: 32
PIF_VALUE: 23
PIF_VALUE: 30
PIF_VALUE: 42
PIF_VALUE: 30
PIF_VALUE: 26
PIF_VALUE: 29
PIF_VALUE: 28
PIF_VALUE: 29
PIF_VALUE: 28
PIF_VALUE: 34
PIF_VALUE: 22
PIF_VALUE: 31
PIF_VALUE: 30
PIF_VALUE: 26
PIF_VALUE: 29
PIF_VALUE: 26
PIF_VALUE: 33
PIF_VALUE: 19
PIF_VALUE: 26
PIF_VALUE: 30
PIF_VALUE: 27
PIF_VALUE: 28
PIF_VALUE: 29
PIF_VALUE: 31
PIF_VALUE: 31
PIF_VALUE: 26
PIF_VALUE: 24
PIF_VALUE: 24
PIF_VALUE: 31
PIF_VALUE: 27
PIF_VALUE: 27
PIF_VALUE: 33
PIF_VALUE: 28
PIF_VALUE: 34
PIF_VALUE: 23
PIF_VALUE: 29
PIF_VALUE: 34
PIF_VALUE: 24
PIF_VALUE: 33
PIF_VALUE: 25
PIF_VALUE: 28
PIF_VALUE: 27
PIF_VALUE: 30
PIF_VALUE: 26
PIF_VALUE: 27
PIF_VALUE: 26
PIF_VALUE: 27
PIF_VALUE: 31
PIF_VALUE: 31
PIF_VALUE: 27
PIF_VALUE: 25
PIF_VALUE: 29
PIF_VALUE: 29
PIF_VALUE: 24
PIF_VALUE: 26
PIF_VALUE: 28
PIF_VALUE: 34
PIF_VALUE: 27
PIF_VALUE: 29
PIF_VALUE: 32
PIF_VALUE: 26
PIF_VALUE: 30
PIF_VALUE: 32
PIF_VALUE: 30
PIF_VALUE: 40
PIF_VALUE: 27
PIF_VALUE: 26
PIF_VALUE: 29
PIF_VALUE: 29
PIF_VALUE: 28
PIF_VALUE: 30
PIF_VALUE: 28
PIF_VALUE: 25
PIF_VALUE: 30
PIF_VALUE: 32
PIF_VALUE: 28
PIF_VALUE: 29
PIF_VALUE: 23
PIF_VALUE: 26
PIF_VALUE: 27
PIF_VALUE: 27
PIF_VALUE: 28
PIF_VALUE: 30
PIF_VALUE: 28
PIF_VALUE: 23
PIF_VALUE: 35
PIF_VALUE: 30
PIF_VALUE: 29
PIF_VALUE: 39
PIF_VALUE: 28
PIF_VALUE: 31
PIF_VALUE: 28
PIF_VALUE: 30
PIF_VALUE: 27
PIF_VALUE: 26
PIF_VALUE: 26
PIF_VALUE: 36
PIF_VALUE: 25
PIF_VALUE: 27
PIF_VALUE: 45
PIF_VALUE: 27
PIF_VALUE: 26
PIF_VALUE: 29
PIF_VALUE: 38
PIF_VALUE: 25
PIF_VALUE: 33
PIF_VALUE: 25
PIF_VALUE: 50
PIF_VALUE: 30
PIF_VALUE: 32
PIF_VALUE: 26
PIF_VALUE: 26
PIF_VALUE: 27
PIF_VALUE: 25
PIF_VALUE: 29
PIF_VALUE: 26
PIF_VALUE: 31
PIF_VALUE: 34
PIF_VALUE: 27
PIF_VALUE: 25
PIF_VALUE: 29
PIF_VALUE: 24
PIF_VALUE: 33
PIF_VALUE: 31
PIF_VALUE: 28
PIF_VALUE: 25
PIF_VALUE: 33
PIF_VALUE: 47
PIF_VALUE: 30
PIF_VALUE: 31
PIF_VALUE: 26
PIF_VALUE: 29
PIF_VALUE: 25
PIF_VALUE: 27
PIF_VALUE: 25
PIF_VALUE: 37
PIF_VALUE: 28
PIF_VALUE: 31
PIF_VALUE: 27
PIF_VALUE: 26
PIF_VALUE: 26
PIF_VALUE: 35
PIF_VALUE: 26
PIF_VALUE: 32
PIF_VALUE: 28
PIF_VALUE: 30
PIF_VALUE: 32
PIF_VALUE: 30
PIF_VALUE: 28
PIF_VALUE: 25
PIF_VALUE: 27
PIF_VALUE: 35
PIF_VALUE: 32
PIF_VALUE: 26
PIF_VALUE: 27
PIF_VALUE: 46
PIF_VALUE: 22
PIF_VALUE: 44
PIF_VALUE: 27
PIF_VALUE: 32
PIF_VALUE: 30
PIF_VALUE: 23
PIF_VALUE: 26
PIF_VALUE: 35
PIF_VALUE: 27
PIF_VALUE: 31
PIF_VALUE: 27
PIF_VALUE: 32
PIF_VALUE: 34
PIF_VALUE: 33
PIF_VALUE: 28
PIF_VALUE: 29
PIF_VALUE: 24
PIF_VALUE: 32
PIF_VALUE: 34
PIF_VALUE: 38
PIF_VALUE: 29
PIF_VALUE: 24
PIF_VALUE: 28
PIF_VALUE: 29
PIF_VALUE: 23
PIF_VALUE: 30
PIF_VALUE: 31
PIF_VALUE: 27
PIF_VALUE: 30
PIF_VALUE: 28
PIF_VALUE: 30
PIF_VALUE: 29
PIF_VALUE: 30
PIF_VALUE: 31
PIF_VALUE: 28
PIF_VALUE: 25
PIF_VALUE: 24
PIF_VALUE: 28
PIF_VALUE: 27
PIF_VALUE: 37
PIF_VALUE: 27
PIF_VALUE: 26
PIF_VALUE: 23
PIF_VALUE: 27
PIF_VALUE: 31
PIF_VALUE: 27
PIF_VALUE: 25
PIF_VALUE: 29
PIF_VALUE: 50
PIF_VALUE: 25
PIF_VALUE: 24
PIF_VALUE: 29
PIF_VALUE: 24
PIF_VALUE: 23
PIF_VALUE: 34
PIF_VALUE: 29
PIF_VALUE: 26
PIF_VALUE: 47
PIF_VALUE: 34
PIF_VALUE: 26
PIF_VALUE: 28
PIF_VALUE: 28
PIF_VALUE: 29
PIF_VALUE: 30
PIF_VALUE: 27
PIF_VALUE: 27
PIF_VALUE: 30
PIF_VALUE: 35
PIF_VALUE: 28
PIF_VALUE: 36
PIF_VALUE: 27
PIF_VALUE: 32
PIF_VALUE: 30
PIF_VALUE: 28
PIF_VALUE: 28
PIF_VALUE: 36
PIF_VALUE: 29
PIF_VALUE: 28
PIF_VALUE: 31
PIF_VALUE: 28
PIF_VALUE: 24
PIF_VALUE: 28
PIF_VALUE: 27
PIF_VALUE: 29
PIF_VALUE: 28
PIF_VALUE: 27
PIF_VALUE: 29
PIF_VALUE: 26
PIF_VALUE: 24
PIF_VALUE: 28
PIF_VALUE: 28
PIF_VALUE: 31
PIF_VALUE: 28
PIF_VALUE: 25
PIF_VALUE: 29
PIF_VALUE: 28
PIF_VALUE: 30
PIF_VALUE: 31
PIF_VALUE: 29
PIF_VALUE: 28
PIF_VALUE: 25
PIF_VALUE: 27
PIF_VALUE: 27
PIF_VALUE: 32
PIF_VALUE: 42
PIF_VALUE: 27
PIF_VALUE: 23
PIF_VALUE: 25
PIF_VALUE: 27
PIF_VALUE: 25
PIF_VALUE: 24
PIF_VALUE: 24
PIF_VALUE: 28
PIF_VALUE: 27
PIF_VALUE: 49
PIF_VALUE: 23
PIF_VALUE: 33
PIF_VALUE: 28
PIF_VALUE: 28
PIF_VALUE: 27
PIF_VALUE: 33
PIF_VALUE: 25
PIF_VALUE: 28
PIF_VALUE: 24
PIF_VALUE: 33
PIF_VALUE: 35
PIF_VALUE: 27
PIF_VALUE: 23
PIF_VALUE: 34
PIF_VALUE: 28
PIF_VALUE: 33
PIF_VALUE: 33
PIF_VALUE: 26
PIF_VALUE: 31
PIF_VALUE: 30
PIF_VALUE: 34
PIF_VALUE: 27
PIF_VALUE: 26
PIF_VALUE: 17
PIF_VALUE: 30
PIF_VALUE: 26
PIF_VALUE: 24
PIF_VALUE: 27
PIF_VALUE: 30
PIF_VALUE: 27
PIF_VALUE: 29
PIF_VALUE: 22
PIF_VALUE: 32
PIF_VALUE: 22
PIF_VALUE: 38
PIF_VALUE: 29
PIF_VALUE: 25
PIF_VALUE: 27
PIF_VALUE: 23
PIF_VALUE: 25
PIF_VALUE: 26
PIF_VALUE: 33
PIF_VALUE: 32
PIF_VALUE: 30
PIF_VALUE: 33
PIF_VALUE: 30
PIF_VALUE: 28
PIF_VALUE: 32
PIF_VALUE: 29
PIF_VALUE: 25
PIF_VALUE: 24
PIF_VALUE: 30
PIF_VALUE: 26
PIF_VALUE: 24
PIF_VALUE: 23
PIF_VALUE: 28
PIF_VALUE: 23
PIF_VALUE: 24
PIF_VALUE: 28
PIF_VALUE: 23
PIF_VALUE: 33
PIF_VALUE: 26
PIF_VALUE: 23
PIF_VALUE: 31
PIF_VALUE: 23
PIF_VALUE: 47
PIF_VALUE: 29
PIF_VALUE: 24
PIF_VALUE: 24
PIF_VALUE: 26
PIF_VALUE: 31
PIF_VALUE: 25

## 2023-01-01 ASSESSMENT — PAIN SCALES - GENERAL
PAINLEVEL_OUTOF10: 0
PAINLEVEL_OUTOF10: 2
PAINLEVEL_OUTOF10: 0
PAINLEVEL_OUTOF10: 5
PAINLEVEL_OUTOF10: 0
PAINLEVEL_OUTOF10: 9
PAINLEVEL_OUTOF10: 0
PAINLEVEL_OUTOF10: 3
PAINLEVEL_OUTOF10: 2
PAINLEVEL_OUTOF10: 0
PAINLEVEL_OUTOF10: 3
PAINLEVEL_OUTOF10: 0
PAINLEVEL_OUTOF10: 3
PAINLEVEL_OUTOF10: 0
PAINLEVEL_OUTOF10: 2
PAINLEVEL_OUTOF10: 3
PAINLEVEL_OUTOF10: 0
PAINLEVEL_OUTOF10: 3
PAINLEVEL_OUTOF10: 0
PAINLEVEL_OUTOF10: 0
PAINLEVEL_OUTOF10: 3
PAINLEVEL_OUTOF10: 0
PAINLEVEL_OUTOF10: 7
PAINLEVEL_OUTOF10: 4
PAINLEVEL_OUTOF10: 0
PAINLEVEL_OUTOF10: 0
PAINLEVEL_OUTOF10: 2
PAINLEVEL_OUTOF10: 0
PAINLEVEL_OUTOF10: 2
PAINLEVEL_OUTOF10: 0
PAINLEVEL_OUTOF10: 2
PAINLEVEL_OUTOF10: 6
PAINLEVEL_OUTOF10: 0
PAINLEVEL_OUTOF10: 0
PAINLEVEL_OUTOF10: 2
PAINLEVEL_OUTOF10: 0
PAINLEVEL_OUTOF10: 3
PAINLEVEL_OUTOF10: 0
PAINLEVEL_OUTOF10: 6
PAINLEVEL_OUTOF10: 0
PAINLEVEL_OUTOF10: 2
PAINLEVEL_OUTOF10: 0

## 2023-01-01 ASSESSMENT — PAIN DESCRIPTION - FREQUENCY: FREQUENCY: INTERMITTENT

## 2023-01-01 ASSESSMENT — PAIN DESCRIPTION - LOCATION
LOCATION: OTHER (COMMENT)
LOCATION: GENERALIZED
LOCATION: OTHER (COMMENT)
LOCATION: OTHER (COMMENT)
LOCATION: ABDOMEN
LOCATION: OTHER (COMMENT)

## 2023-01-01 ASSESSMENT — PAIN SCALES - WONG BAKER

## 2023-01-01 ASSESSMENT — PAIN DESCRIPTION - DESCRIPTORS
DESCRIPTORS: PATIENT UNABLE TO DESCRIBE
DESCRIPTORS: PATIENT UNABLE TO DESCRIBE
DESCRIPTORS: ACHING
DESCRIPTORS: PATIENT UNABLE TO DESCRIBE
DESCRIPTORS: PATIENT UNABLE TO DESCRIBE
DESCRIPTORS: DISCOMFORT
DESCRIPTORS: PATIENT UNABLE TO DESCRIBE

## 2023-01-01 ASSESSMENT — PAIN DESCRIPTION - PAIN TYPE: TYPE: CHRONIC PAIN

## 2023-01-01 ASSESSMENT — PAIN DESCRIPTION - ONSET: ONSET: ON-GOING

## 2023-01-01 ASSESSMENT — PAIN - FUNCTIONAL ASSESSMENT: PAIN_FUNCTIONAL_ASSESSMENT: NONE - DENIES PAIN

## 2023-01-01 ASSESSMENT — LIFESTYLE VARIABLES
HOW MANY STANDARD DRINKS CONTAINING ALCOHOL DO YOU HAVE ON A TYPICAL DAY: PATIENT DOES NOT DRINK
HOW OFTEN DO YOU HAVE A DRINK CONTAINING ALCOHOL: NEVER

## 2023-01-01 ASSESSMENT — PAIN DESCRIPTION - ORIENTATION: ORIENTATION: RIGHT

## 2023-03-21 PROBLEM — I50.43 CHF (CONGESTIVE HEART FAILURE), NYHA CLASS I, ACUTE ON CHRONIC, COMBINED (HCC): Status: ACTIVE | Noted: 2023-03-21

## 2023-03-21 PROBLEM — I50.9 ACUTE ON CHRONIC CONGESTIVE HEART FAILURE, UNSPECIFIED HEART FAILURE TYPE (HCC): Status: ACTIVE | Noted: 2023-03-21

## 2023-03-25 PROBLEM — I50.43 ACUTE ON CHRONIC COMBINED SYSTOLIC AND DIASTOLIC CONGESTIVE HEART FAILURE (HCC): Status: ACTIVE | Noted: 2023-03-21

## 2023-03-31 ENCOUNTER — ANESTHESIA (OUTPATIENT)
Dept: ICU | Age: 83
End: 2023-03-31
Payer: OTHER GOVERNMENT

## 2023-03-31 ENCOUNTER — ANESTHESIA EVENT (OUTPATIENT)
Dept: ICU | Age: 83
End: 2023-03-31
Payer: OTHER GOVERNMENT

## 2023-03-31 PROCEDURE — 31500 INSERT EMERGENCY AIRWAY: CPT

## 2023-03-31 NOTE — ANESTHESIA PROCEDURE NOTES
Airway  Date/Time: 3/31/2023 6:20 AM  Urgency: emergent      General Information and Staff    Patient location during procedure: ICU  Resident/CRNA: AISHA Younger - CRNA  Other anesthesia staff: Monty Wayne RN  Performed: other anesthesia staff     Consent for Airway (if performed for an anesthetic, see related documentation for consents)  Patient identity confirmed: per hospital policy  Consent: The procedure was performed in an emergent situation. Verbal consent not obtained. Written consent not obtained.   Risks and benefits: risks, benefits and alternatives were not discussed      Code status verified:yes  Indications and Patient Condition  Indications for airway management: respiratory failure, hypoxemia and airway protection  Spontaneous ventilation: present  Sedation level: deep  Preoxygenated: yes  Patient position: sniffing  MILS not maintained throughout  Mask difficulty assessment: vent by bag mask    Final Airway Details  Final airway type: endotracheal airway      Successful airway: ETT  Cuffed: yes   Successful intubation technique: video laryngoscopy  Facilitating devices/methods: intubating stylet  Endotracheal tube insertion site: oral  Blade size: #4  ETT size (mm): 8.0  Cormack-Lehane Classification: grade IIa - partial view of glottis  Placement verified by: chest auscultation and capnometry   Measured from: teeth  ETT to teeth (cm): 22  Number of attempts at approach: 1  Ventilation between attempts: bag mask  Number of other approaches attempted: 0

## 2023-04-01 PROBLEM — Z79.01 CHRONIC ANTICOAGULATION: Status: ACTIVE | Noted: 2023-01-01

## 2023-04-01 PROBLEM — I27.20 PULMONARY HYPERTENSION (HCC): Status: ACTIVE | Noted: 2023-01-01

## 2023-04-01 PROBLEM — R18.8 OTHER ASCITES: Status: ACTIVE | Noted: 2023-01-01

## 2023-04-01 PROBLEM — I48.19 PERSISTENT ATRIAL FIBRILLATION (HCC): Status: ACTIVE | Noted: 2023-01-01

## 2023-04-01 PROBLEM — I25.5 ISCHEMIC CARDIOMYOPATHY: Status: ACTIVE | Noted: 2023-04-01

## 2023-04-01 PROBLEM — J96.02 ACUTE RESPIRATORY FAILURE WITH HYPOXIA AND HYPERCAPNIA (HCC): Status: ACTIVE | Noted: 2023-04-01

## 2023-04-01 PROBLEM — N17.9 ACUTE KIDNEY INJURY SUPERIMPOSED ON CHRONIC KIDNEY DISEASE (HCC): Status: ACTIVE | Noted: 2023-04-01

## 2023-04-01 PROBLEM — Z95.1 STATUS POST CORONARY ARTERY BYPASS GRAFT: Status: ACTIVE | Noted: 2023-01-01

## 2023-04-01 PROBLEM — I36.1 NONRHEUMATIC TRICUSPID VALVE REGURGITATION: Status: ACTIVE | Noted: 2023-04-01

## 2023-04-01 PROBLEM — Z98.890 S/P MITRAL VALVE REPAIR: Status: ACTIVE | Noted: 2023-01-01

## 2023-04-01 PROBLEM — N18.9 ACUTE KIDNEY INJURY SUPERIMPOSED ON CHRONIC KIDNEY DISEASE (HCC): Status: ACTIVE | Noted: 2023-01-01

## 2023-04-01 PROBLEM — J96.01 ACUTE RESPIRATORY FAILURE WITH HYPOXIA AND HYPERCAPNIA (HCC): Status: ACTIVE | Noted: 2023-01-01

## 2023-04-08 PROBLEM — K74.69 OTHER CIRRHOSIS OF LIVER (HCC): Status: ACTIVE | Noted: 2023-01-01

## 2023-04-08 PROBLEM — D64.9 ANEMIA: Status: ACTIVE | Noted: 2023-01-01

## 2023-04-12 PROBLEM — Z71.89 GOALS OF CARE, COUNSELING/DISCUSSION: Status: ACTIVE | Noted: 2023-01-01

## 2023-04-12 PROBLEM — Z51.5 PALLIATIVE CARE BY SPECIALIST: Status: ACTIVE | Noted: 2023-01-01

## 2023-04-14 PROBLEM — J69.0 ASPIRATION PNEUMONIA (HCC): Status: ACTIVE | Noted: 2023-01-01

## 2023-04-14 PROBLEM — K56.7 ILEUS (HCC): Status: ACTIVE | Noted: 2023-01-01

## 2023-04-15 PROBLEM — E55.9 VITAMIN D INSUFFICIENCY: Status: ACTIVE | Noted: 2023-01-01

## 2023-04-17 PROBLEM — N18.6 END STAGE RENAL DISEASE (HCC): Status: ACTIVE | Noted: 2023-01-01

## 2023-04-17 NOTE — PROCEDURES
Spontaneous Parameters performed    VT = 118 ml  f = 53  B/M  Ve = 6.26 L/M  NIF = -7  cmH2O  VC =  N/A  RSBI = 449  Weak effort, several attempts.     Performed by Jennie Duff RCP

## 2023-04-18 NOTE — FLOWSHEET NOTE
04/18/23 1117   Vital Signs   BP 83/62   Temp 98.1 °F (36.7 °C)   Heart Rate 78   Resp (!) 40   Post-Hemodialysis Assessment   Post-Treatment Procedures Blood returned;Catheter capped, clamped and heparinized x 2 ports   Machine Disinfection Process Acid/Vinegar Clean;Bicarb Jug Disinfection; Exterior Machine Disinfection; Heat Disinfect;Verified Absence of Bleach in HCO3 Jug   Rinseback Volume (ml) 300 ml   Blood Volume Processed (Liters) 52.1 l/min   Dialyzer Clearance Lightly streaked   Duration of Treatment (minutes) 210 minutes   Hemodialysis Intake (ml) 300 ml   Hemodialysis Output (ml) 1800 ml   NET Removed (ml) 1500   Tolerated Treatment Good   Patient Response to Treatment HD tolerated as ordered   Bilateral Breath Sounds Diminished   Edema Generalized   Edema Generalized Trace   RUE Edema Trace   LUE Edema Trace   RLE Edema Trace   LLE Edema Trace   Time Off 1110   Patient Disposition Remain in ICU/ED

## 2023-04-18 NOTE — CARE COORDINATION
Care Coordination: Left VM for Eugene Archuleta at [facility] [phone]. David Cruz Awaiting return call for status of transfer    Electronically signed by Nhan Mabry RN on 4/18/2023 at 8:50 AM     Addendum: call to transfer center at [address] [place] [address] to check to see if Dajuan OJEDA is in. Spoke with Sarah Garcia at transfer center. Kolby Kilgore is off today, they are not sure who is covering. I am told there are no ICU beds available today per [address] She asked for me to just leave a vm for CM at [facility] and she should get back to me tomorrow. No planned transfer to [facility] today. I have faxed clinical update to  fax line so she can review when she is back in tomorrow. . Remains intubated, Precedex, zosyn iv    Electronically signed by Nhan Mabry RN on 4/18/2023 at 10:14 AM     Addendum: attempted to reach pt's wife to update her as well as checking to see if she is still agreeable to [facility]. She does has option to have pt remain here under Medicare benefits and decline coverage from [place]. Although she would like to remain here in [place], she does not want to decline her VA benefits. unfortunately, the only way you can have continued coverage under VA is to transfer to [place] once pt has reached point of stability to transfer when deemed appropriate by their accepting physicians. If she declines VA benefits and remains here, hospitalization will revert under medicare A and does not have Part B coverage- and be responsible for all out of pocket costs. Also , last I spoke to her, she was declining Trach, so uncertain plan of care if pt remains here. [facility] was following if pt proceeded to Trach/peg.       Electronically signed by Nhan Mabry RN on 4/18/2023 at 10:34 AM

## 2023-04-19 PROBLEM — Z51.5 COMFORT MEASURES ONLY STATUS: Status: ACTIVE | Noted: 2023-01-01

## 2023-04-19 NOTE — PLAN OF CARE
Problem: Chronic Conditions and Co-morbidities  Goal: Patient's chronic conditions and co-morbidity symptoms are monitored and maintained or improved  Outcome: Progressing     Problem: Discharge Planning  Goal: Discharge to home or other facility with appropriate resources  Outcome: Not Progressing     Problem: Safety - Adult  Goal: Free from fall injury  Outcome: Progressing     Problem: ABCDS Injury Assessment  Goal: Absence of physical injury  Outcome: Progressing     Problem: Skin/Tissue Integrity  Goal: Absence of new skin breakdown  Outcome: Not Progressing     Problem: Cardiovascular - Adult  Goal: Maintains optimal cardiac output and hemodynamic stability  Outcome: Progressing     Problem: Metabolic/Fluid and Electrolytes - Adult  Goal: Hemodynamic stability and optimal renal function maintained  Outcome: Progressing     Problem: Respiratory - Adult  Goal: Achieves optimal ventilation and oxygenation  Outcome: Progressing     Problem: Nutrition Deficit:  Goal: Optimize nutritional status  Outcome: Not Progressing     Problem: Pain  Goal: Verbalizes/displays adequate comfort level or baseline comfort level  Outcome: Progressing  Flowsheets (Taken 4/16/2023 1700 by Elham Guzmán RN)  Verbalizes/displays adequate comfort level or baseline comfort level: Encourage patient to monitor pain and request assistance     Problem: Safety - Medical Restraint  Goal: Remains free of injury from restraints (Restraint for Interference with Medical Device)  Recent Flowsheet Documentation  Taken 4/16/2023 1800 by Elham Guzmán RN  Remains free of injury from restraints (restraint for interference with medical device): Determine that other, less restrictive measures have been tried or would not be effective before applying the restraint  Taken 4/16/2023 1600 by Elham Guzmán RN  Remains free of injury from restraints (restraint for interference with medical device): Determine that other, less restrictive measures
Problem: Discharge Planning  Goal: Discharge to home or other facility with appropriate resources  4/19/2023 0602 by Heather Luis RN  Outcome: Not Progressing     Problem: Nutrition Deficit:  Goal: Optimize nutritional status  4/19/2023 0602 by Heather Luis RN  Outcome: Not Progressing     Problem: Chronic Conditions and Co-morbidities  Goal: Patient's chronic conditions and co-morbidity symptoms are monitored and maintained or improved  4/19/2023 0602 by Heather Luis RN  Outcome: Progressing     Problem: Safety - Adult  Goal: Free from fall injury  4/19/2023 0602 by Heather Luis RN  Outcome: Progressing     Problem: ABCDS Injury Assessment  Goal: Absence of physical injury  4/19/2023 0602 by Heather Luis RN  Outcome: Progressing     Problem: Skin/Tissue Integrity  Goal: Absence of new skin breakdown  Description: 1. Monitor for areas of redness and/or skin breakdown  2. Assess vascular access sites hourly  3. Every 4-6 hours minimum:  Change oxygen saturation probe site  4. Every 4-6 hours:  If on nasal continuous positive airway pressure, respiratory therapy assess nares and determine need for appliance change or resting period.   4/19/2023 0602 by Heather Luis RN  Outcome: Progressing     Problem: Discharge Planning  Goal: Discharge to home or other facility with appropriate resources  4/19/2023 0602 by Heather uLis RN  Outcome: Not Progressing  4/18/2023 1807 by Yun Escamilla RN  Outcome: Progressing     Problem: Nutrition Deficit:  Goal: Optimize nutritional status  4/19/2023 0602 by Heather Luis RN  Outcome: Not Progressing  4/18/2023 1807 by Yun Escamilla RN  Outcome: Progressing  Flowsheets (Taken 4/18/2023 1031 by No Sage, RD, LD)  Nutrient intake appropriate for improving, restoring, or maintaining nutritional needs: Recommend, monitor, and adjust tube feedings and TPN/PPN based on assessed needs
Problem: Respiratory - Adult  Goal: Achieves optimal ventilation and oxygenation  4/19/2023 0933 by Consuelo Guajardo RCP  Outcome: Not Progressing
Problem: Safety - Adult  Goal: Free from fall injury  Outcome: Progressing     Problem: ABCDS Injury Assessment  Goal: Absence of physical injury  Outcome: Progressing     Problem: Skin/Tissue Integrity  Goal: Absence of new skin breakdown  Description: 1. Monitor for areas of redness and/or skin breakdown  2. Assess vascular access sites hourly  3. Every 4-6 hours minimum:  Change oxygen saturation probe site  4. Every 4-6 hours:  If on nasal continuous positive airway pressure, respiratory therapy assess nares and determine need for appliance change or resting period. Outcome: Progressing     Problem: Cardiovascular - Adult  Goal: Maintains optimal cardiac output and hemodynamic stability  Outcome: Progressing     Problem: Safety - Medical Restraint  Goal: Remains free of injury from restraints (Restraint for Interference with Medical Device)  Description: INTERVENTIONS:  1. Determine that other, less restrictive measures have been tried or would not be effective before applying the restraint  2. Evaluate the patient's condition at the time of restraint application  3. Inform patient/family regarding the reason for restraint  4.  Q2H: Monitor safety, psychosocial status, comfort, nutrition and hydration  Outcome: Progressing  Flowsheets (Taken 4/17/2023 2000)  Remains free of injury from restraints (restraint for interference with medical device): Every 2 hours: Monitor safety, psychosocial status, comfort, nutrition and hydration
appropriate for improving, restoring, or maintaining nutritional needs: Recommend, monitor, and adjust tube feedings and TPN/PPN based on assessed needs     Problem: Respiratory - Adult  Goal: Achieves optimal ventilation and oxygenation  Outcome: Progressing     Problem: Safety - Medical Restraint  Goal: Remains free of injury from restraints (Restraint for Interference with Medical Device)  Description: INTERVENTIONS:  1. Determine that other, less restrictive measures have been tried or would not be effective before applying the restraint  2. Evaluate the patient's condition at the time of restraint application  3. Inform patient/family regarding the reason for restraint  4.  Q2H: Monitor safety, psychosocial status, comfort, nutrition and hydration  4/18/2023 0604 by Heather Luis RN  Outcome: Progressing  Flowsheets (Taken 4/17/2023 2000)  Remains free of injury from restraints (restraint for interference with medical device): Every 2 hours: Monitor safety, psychosocial status, comfort, nutrition and hydration

## 2023-04-19 NOTE — CONSULTS
she is available. OBJECTIVE:   Prognosis: Guarded    ROS:   HAYDEN, intubated/sedated    Physical Exam:  /60   Pulse 72   Temp 98.4 °F (36.9 °C) (Temporal)   Resp 19   Ht 5' 9\" (1.753 m)   Wt 200 lb 9.9 oz (91 kg)   SpO2 100%   BMI 29.63 kg/m²   Constitutional:  Elderly, ill-appearing, NGT in place; intubated; on vent  Lungs:  on vent; rapid respirations at times; Heart:  A-fib; RRR, no murmur, rub, or gallop noted during exam  Abd:  soft, NG draining large amounts  Ext:  + edema, pulses present  Skin:  Warm and dry, no rashes on visible skin  Neuro: intubated; sedated; on vent; does follow some commands    Objective data reviewed: labs, images, records, medication use, vitals, and chart    Discussed patient and the plan of care with the other IDT members: Palliative Medicine IDT Team, Floor Nurse, and Family, ICU team    Time/Communication  Greater than 50% of time spent, total 35 minutes in counseling and coordination of care at the bedside regarding  CODE STATUS discussion, goals of care, and diagnosis and prognosis. Thank you for allowing Palliative Medicine to participate in the care of Dorita Vallejo.

## 2023-04-19 NOTE — CARE COORDINATION
Care Coordination: LOS 29 day. Spoke to WILMAN at [address] [facility], she is discussing with physicians, if pt is accepted, they will touch base with Dr Yamileth Salazar, need new PCR and set up transport- she will notify me shortly of plan    Electronically signed by Becky Jackman, RN on 4/19/2023 at 10:40 AM     Addendum: Nursing was able to locate a nephew. This nephew will reach out to wife to discuss Comfort care. In the meantime, discussed with CC team and will pursue both plans for now. If pt is accepted to [facility] will set up transport until it is confirmed wife is willing to withdraw.   If wife is not in agreement with terminal extubation, then plan will be to [facility] in [facility] [place]    Electronically signed by Becky Jackman, RN on 4/19/2023 at 11:23 AM

## 2023-04-19 NOTE — CARE COORDINATION
Care Coordination:  LOS 29 Day. Left VM with Hoda at [address] Awaiting return call to see if any bed availability. in the meantime,discussed in rounds. Plan is to discuss with wife to proceed to trach/peg vs hospice today as well.     Electronically signed by Deshawn Schmid RN on 4/19/2023 at 9:20 AM

## 2023-04-19 NOTE — CARE COORDINATION
Care Coordination: LOS 29 day. Pt accepted by Dr Kosta Love at [facility]. Will need stat PCR. She is arranging with mobile ICU transport and will call back once arranged, she is only in until 15:30 and any further questions will be directed to bed coordinator at ext [phone] for MICU is [phone] and cannot be called until transport picks up pt. Electronically signed by Eliot Britt RN on 4/19/2023 at 11:58 AM     Addendum: Allan Alexandre to [facility], need covid, she will set up transport, will need 2 ltrs. And will call back with time.     Electronically signed by Eliot Britt RN on 4/19/2023 at 12:18 PM

## 2023-04-19 NOTE — DISCHARGE SUMMARY
tricuspid valve regurgitation    Pulmonary hypertension (HCC)    Chronic anticoagulation    Other ascites    Acute kidney injury superimposed on chronic kidney disease (HCC)    Other cirrhosis of liver (HCC)    Anemia    Palliative care by specialist    Goals of care, counseling/discussion    Aspiration pneumonia ([facility]    Ileus ([facility]    Vitamin D insufficiency    End stage renal disease ([facility]         Consults: cardiology, pulmonary/intensive care, GI, nephrology, neurology, general surgery, vascular surgery, and palliative  care      Brenden Kaiser MD   04/19/23 12:12 PM    Hospital Medicine   Time Spent: 60 min

## 2023-04-20 NOTE — DEATH NOTES
Expiration Note:    Called to bedside to pronounce death after patient was noted to be in asystole at 2007 4/19/2023. NO pupillary, corneal, doll's eye or gag reflex noted. NO heart sounds or pulse noted. NO Chest rise or Lung sounds noted. NO Response to painful stimuli  Time of death declared at 2007 4/19/2023.       Iza Ling MD MD PGY-2  4/19/2023  9:01 PM

## 2023-04-20 NOTE — PROGRESS NOTES
04/19/23 0751   NICU Vent Information   Ventilator -04   Vent Type 980   Vent Mode AC/PRVC   Vt (Set, mL) 450 mL   Vt (Measured) 16 mL   Resp Rate (Set) 16 bmp   Rate Measured 34 br/min   Minute Volume (L/min) 12 Liters   FiO2  40 %   Peak Inspiratory Pressure (cmH2O) 44 cmH2O   I:E Ratio 3.30:1   Sensitivity 2   PEEP/CPAP (cmH2O) 6   I Time/ I Time % 0.82 s   Mean Airway Pressure (cmH2O) 25 cmH20   Plateau Pressure 22 CAA17   Static Compliance 29 mL/cmH2O       DAILY VENTILATOR WEANING ASSESSMENT PERFORMED    P/FIO2 Ratio = 3.26                Cs = 29                         Plat. Pressure = 22  MV = 12  RSBI = N/A    Instabilities:       Cardiovascular =       CNS =       Respiratory =       Metabolic =    Parameters    no    Wean per protocol  no    Ask Physician for a weaning plan no    Additional Comments: Patient being considered for [facility] transfer and/or trach. Performed by Zoya Correa RCP RRT      Reference Table:    Cardiovascular     CNS      1. Mean BP less than or equal to 75   1. Neuromuscular blockade  2. Heart Rate greater than 130   2. RASS of -3, -4, -5  3. Myocardial Ischemia    3. RASS of +3, +4  4. Mechanical Assist Device    4. ICP greater than 15 or             Intracranial Hypertension         Respiratory      Metabolic  1. PEEP equal to or greater than 10cm/H20  1. Temp. (8hrs) less than 95 or > 103  2. Respiratory Rate greater than 35   2. WBC < 5000 or > 34091  3. Minute Volume greater than 15L  4. pH less than 7.30  5.  Deteriorating chest X-ray
200 Family meeting held with pt's wife Norm Burnham, nephew Keiko Reganr, niece Kerry Sadler, myself and palliative medicine SSW. All were updated on Dario's current condition and continued decline. We talked at length about the patient's wishes of not going to a nursing home and being on prolonged ventilation. The pt adamantly expressed during his hospitalization he did not want to undergo a trach/PEG and options were discussed which the patient wanted comfort care. Norm Burnham has now agreed to proceed with comfort measures only and to pursue compassionate extubation. We talked about the process of extubation and if would survive more than a few hours-hospice would be appropriate at that time. She was tearful and was able to express a life review of their marriage of 64 years. Keiko Harris and Kerry Sadler are supportive of Alexa's decision at this time. Norm Burnham especially expressed her thanks to Dr. Christina Hernandez for his kindness and compassion throughout this hospitalization. Spiritual support was offered. Med orders placed by Dr. Christina Hernandez. Staff informed of family decision.    Thanks to the family for honoring the patient's request.
[facility]   Department of Internal Medicine   Internal Medicine Residency  MICU Progress Note    Patient:  Leo Spatz 80 y.o. male   MRN: [id number]       Date of Service: 2023    Allergy: Iodine    Subjective     Patient was seen and examined this morning at bedside. Overnight, no significant events    Patient seen at bedside this AM. Patient intubated and minimally sedated. Patient undergoing HD today. Objective     TEMPERATURE:  Current - Temp: 98 °F (36.7 °C); Max - Temp  Av.1 °F (36.7 °C)  Min: 98 °F (36.7 °C)  Max: 98.4 °F (36.9 °C)  RESPIRATIONS RANGE: Resp  Av.6  Min: 16  Max: 42  PULSE RANGE: Pulse  Av.6  Min: 63  Max: 83  BLOOD PRESSURE RANGE:  Systolic (79QBM), EPC:300 , Min:83 , JORDYN:608   ; Diastolic (75QIG), QSB:87, Min:54, Max:83    PULSE OXIMETRY RANGE: SpO2  Av.5 %  Min: 94 %  Max: 100 %    I & O - 24hr:    Intake/Output Summary (Last 24 hours) at 2023  Last data filed at 2023 1740  Gross per 24 hour   Intake 2267.92 ml   Output 1800 ml   Net 467.92 ml       I/O last 3 completed shifts: In: 2993.6 [I.V.:1505; NG/GT:904; IV Piggyback:284.5]  Out: 1800  No intake/output data recorded. Weight change: -9.3 oz (-0.264 kg)    Physical Exam:  General Appearance:    Chronically ill appearing. Intubated   HEENT:    Head: Normocephalic, no lesions, without obvious abnormality   Neck:   Supple, no jugular venous distention. Intubated   Resp:     Mild wheezes appreciated bilaterally. Heart:    Irregularly irregular rhythm. .    Abdomen:     Soft, mild tenderness, protuberant but non-distended abdomen with normal bowel sounds   Extremities:   Atraumatic, no cyanosis or edema. Lower extremity stasis wounds present. 2+ edema in bilateral lower extremities.     Pulses:  Radial and pedal pulses are intact bilaterally   Neurologic:  Sedated       Medications     Continuous Infusions:   fentaNYL 50 mcg/hr (23 1740)    dexmedetomidine
[facility] notified of patient expiration. Referral Number: [id number]  Placed hold on patient.
Comprehensive Nutrition Assessment    Type and Reason for Visit:  Reassess    Nutrition Recommendations/Plan:     Continue NPO, Minimal nutrition/ TF held for several days, resume ? Goal TF Rec: Renal @ 45 ml/hr + 1 protein modular daily. Will provide: 1080 ml tv, 1944 kcals, 87 gm pro (2044 kcals & 113 gm pro w/ mod), 785 ml free water  Pt at high risk for re-feeding syndrome, Monitor electrolytes/ replace prn       Malnutrition Assessment:  Malnutrition Status: At risk for malnutrition (04/05/23 1359)    Context:  Acute Illness     Findings of the 6 clinical characteristics of malnutrition:  Energy Intake:  50% or less of estimated energy requirements for 5 or more days  Weight Loss:  Unable to assess (d/t fluid shifts ; hx of CHF)     Body Fat Loss:  Unable to assess     Muscle Mass Loss:  Unable to assess    Fluid Accumulation:  No significant fluid accumulation     Strength:  Not Performed    Nutrition Assessment:    Pt remains at risk d/t ongoing re-intubated  w/ Septic Shock/ concern for aspiration PNA. Admit initially w/ SOB found to have liver cirrhosis/ large ascites s/p paracentesis x 2 (3.75L removed). Noted SAMM (CRRT transitioned to HD). Noted s/p retroperitoneal hematoma. PMHx CAD, CHF, COPD, DM, & Chronic Lymphedema. Noted minimal nutrition for several days d/t ileus (appears resolved). Will provide updated TF recs & monitor. Nutrition Related Findings:    Pt remains re-intubated/ sedated, MAP WNL, +I/O's 2L, trace gen edema, active BS, abd distention, ileus resolved?  HD (K/Phos WNL), NGT     Wound Type: Venous Stasis, Partial Thickness       Current Nutrition Intake & Therapies:  NPO    Current Tube Feeding (TF) Orders:  Feeding Route: Nasogastric      Anthropometric Measures:  Height: 5' 9\" (175.3 cm)  Ideal Body Weight (IBW): 160 lbs (73 kg)    Admission Body Weight: 215 lb (97.5 kg) (3/21, standing scale)  Current Body Weight: 200 lb 9.9 oz (91 kg) (4/18 actual, noted wt fluctuations
[facility]    Referral received. Chart reviewed. Nephew and his wife at bedside. Wife getting something to eat. Came back to speak to wife. The hospice benefit and philosophy were explained including that hospice is end of life care in which, per Medicare, a patient has a terminal diagnosis that life expectancy would be 6 months or less. Explained that once in hospice care, all aggressive treatments would be stopped and allow nature to takes its course with focus on comfort care for the patient. The [facility] for short-term symptom management and respite was discussed. The wife, Jessica Juarez was very tearful. She is agreeable to [facility] but does not want him moved until tomorrow. The nephew agrees with the above. Hospice will follow up with wife tomorrow.      Bedside nurse notified of above    Electronically signed by Shawn Narayan RN on 4/19/2023 at 4:36 PM  [phone]; [phone]
Hospice consult noted, agency choices reviewed with wife and nephew. [facility]. Referral made to [facility], [facility] per family choice.
Nephrology Progress Note  4/18/2023 10:34 AM  Subjective:   Admit Date: 3/21/2023  PCP: No primary care provider on file.   Subjective    4/12: Off pressors; on Precedex no other acute issues from overnight    4/13: No new acute issues from overnight; remains intubated; possible transfer to [facility]    4/14: Episodes of emesis overnight; KUB ordered; [facility] transfer    4/15: seen in icu, intubated, sp hd today with 2  L off    4/16: pt seen in icu, remains intubated, sp hd yesterday    4/17: pt seen in icu,for hd tomorrow, intubated    4/18: pt intubated in icu, for hd today    Diet: Diet NPO    Data:   Scheduled Meds:   vitamin D  6,000 Units Oral Daily    bisacodyl  10 mg Rectal Q12H    piperacillin-tazobactam  4,500 mg IntraVENous Q12H    polyethylene glycol  17 g Per G Tube BID    midodrine  10 mg Oral TID WC    nystatin  5 mL Oral 4x Daily    pantoprazole  40 mg IntraVENous BID    insulin lispro  0-4 Units SubCUTAneous Q4H    chlorhexidine  15 mL Mouth/Throat BID    lactulose  20 g Oral TID    rifAXIMin  550 mg Oral BID    [Held by provider] aspirin  81 mg Oral Daily    epoetin roly-epbx  3,000 Units SubCUTAneous Once per day on Mon Wed Fri    [Held by provider] atorvastatin  40 mg Oral Nightly    budesonide  500 mcg Nebulization BID    arformoterol tartrate  15 mcg Nebulization BID    white petrolatum   Topical BID    [Held by provider] apixaban  2.5 mg Oral BID    [Held by provider] therapeutic multivitamin-minerals  1 tablet Oral Daily    [Held by provider] tamsulosin  0.4 mg Oral Daily    sodium chloride flush  10 mL IntraVENous 2 times per day     Continuous Infusions:   fentaNYL 25 mcg/hr (04/16/23 1243)    dexmedetomidine (PRECEDEX) IV infusion 1.5 mcg/kg/hr (04/18/23 0736)    dextrose      sodium chloride       PRN Meds:heparin (porcine), medicated lip balm, heparin flush, guaiFENesin, fentanNYL, artificial tears, polyvinyl alcohol, ipratropium-albuterol, sodium chloride (Inhalant), glucose,
Nephrology Progress Note  4/19/2023 10:40 AM  Subjective:   Admit Date: 3/21/2023  PCP: No primary care provider on file.   Subjective    4/12: Off pressors; on Precedex no other acute issues from overnight    4/13: No new acute issues from overnight; remains intubated; possible transfer to [facility]    4/14: Episodes of emesis overnight; KUB ordered; [facility] transfer    4/15: seen in icu, intubated, sp hd today with 2  L off    4/16: pt seen in icu, remains intubated, sp hd yesterday    4/17: pt seen in icu,for hd tomorrow, intubated    4/18: pt intubated in icu, for hd today    4/19: pt intubated in icu, sp hd yesterday with 1.5L     Diet: Diet NPO    Data:   Scheduled Meds:   vitamin D  6,000 Units Oral Daily    bisacodyl  10 mg Rectal Q12H    polyethylene glycol  17 g Per G Tube BID    midodrine  10 mg Oral TID WC    nystatin  5 mL Oral 4x Daily    pantoprazole  40 mg IntraVENous BID    insulin lispro  0-4 Units SubCUTAneous Q4H    chlorhexidine  15 mL Mouth/Throat BID    lactulose  20 g Oral TID    rifAXIMin  550 mg Oral BID    [Held by provider] aspirin  81 mg Oral Daily    epoetin roly-epbx  3,000 Units SubCUTAneous Once per day on Mon Wed Fri    [Held by provider] atorvastatin  40 mg Oral Nightly    budesonide  500 mcg Nebulization BID    arformoterol tartrate  15 mcg Nebulization BID    white petrolatum   Topical BID    [Held by provider] apixaban  2.5 mg Oral BID    [Held by provider] therapeutic multivitamin-minerals  1 tablet Oral Daily    [Held by provider] tamsulosin  0.4 mg Oral Daily    sodium chloride flush  10 mL IntraVENous 2 times per day     Continuous Infusions:   fentaNYL 75 mcg/hr (04/19/23 0715)    dexmedetomidine (PRECEDEX) IV infusion 1.5 mcg/kg/hr (04/19/23 0831)    dextrose      sodium chloride       PRN Meds:heparin (porcine), medicated lip balm, heparin flush, guaiFENesin, fentanNYL, artificial tears, polyvinyl alcohol, ipratropium-albuterol, sodium chloride (Inhalant), glucose,
Palliative Care LSW long with NP and unit resident  met with pts wife Jong Pyle, his nephew Micha Smith and his wife Brooklyn Sarabia to discuss goals of care. Jong Pyle and pts nephew are in agreement that pt would not want to continue on permanent life support. They would like to proceed with compassionate extubation today. Spiritual care contacted per family request.  We discussed process, and eventual hospice consult if needed. Supportive listening and anticipatory grief support offered.
Patient not tolerating ventilator. Double stacking breaths. Despite PRN fentanyl push--patient remains breathing 40+ /min. Medical resident notified. See MAR for orders.
Patient terminally extubated to 2L NC, family at the bedside
Pt seen in icu, he remains intubated. His nephew Sloan Urena and hs wife Ortega Steel are here from [place]. Kendra Andrade explains that pts wife did not tel the family the full extent of pts illnesses until last evening. He is going to go see pts wife and attempt to bring her in for a goals of care meeting. Kendra Andrade feels pt would not want to continue on aggressive life support. Pt had also states this to our NP in past. Plan to meet with wife when she is available.
Vascular Surgery Progress Note    Pt is being seen in f/u today regarding tunneled dialysis catheter    Subjective  Pt s/e. Pts wife and nephew at the bedside. They have decided on compassionate extubation today and just want to keep him comfortable. Current Medications:    sodium chloride        glycopyrrolate, morphine **OR** morphine, LORazepam, fentanNYL, heparin (porcine), medicated lip balm, heparin flush, guaiFENesin, artificial tears, polyvinyl alcohol, ipratropium-albuterol, sodium chloride (Inhalant), albuterol, sodium chloride flush, sodium chloride, promethazine **OR** ondansetron, acetaminophen **OR** acetaminophen        PHYSICAL EXAM:    /62   Pulse 74   Temp 98.4 °F (36.9 °C) (Temporal)   Resp 16   Ht 5' 9\" (1.753 m)   Wt 200 lb 9.9 oz (91 kg)   SpO2 100%   BMI 29.63 kg/m²     Intake/Output Summary (Last 24 hours) at 4/19/2023 1417  Last data filed at 4/19/2023 1200  Gross per 24 hour   Intake 2655.62 ml   Output 1000 ml   Net 1655.62 ml        Gen intubated, sedated  CVS S1S2   Resp on mechanical ventilation  Abd distended  R IJ temp hd cath in place    LABS:    Lab Results   Component Value Date    WBC 10.4 04/19/2023    HGB 7.7 (L) 04/19/2023    HCT 23.4 (L) 04/19/2023     04/19/2023    PROTIME 17.1 (H) 04/03/2023    INR 1.6 04/03/2023    APTT 36.6 (H) 03/30/2023    K 4.0 04/19/2023    BUN 31 (H) 04/19/2023    CREATININE 2.9 (H) 04/19/2023     A/P SAMM on CKD requiring dialysis  Family has decided on compassionate extubation and want to keep the pt comfortable. We will cancel tunneled catheter that was planned for tomorrow. Please call as needed.     AISHA Luna - CNP
patient vomited about 300 ml of clear liquid, tube feed turned off and dr. Juan Miguel Liriano notified
Continuous Monica Bartlett MD 29.84 mL/hr at 04/17/23 1214 1.3 mcg/kg/hr at 04/17/23 1214    piperacillin-tazobactam (ZOSYN) 4,500 mg in sodium chloride 0.9 % 100 mL IVPB ([id number])  4,500 mg IntraVENous Q12H Monica Bartlett MD   Stopped at 04/17/23 1219    heparin flush 100 UNIT/ML injection 100 Units  100 Units IntraCATHeter PRN So Rothman MD   100 Units at 04/10/23 0206    guaiFENesin (ROBITUSSIN) 100 MG/5ML liquid 200 mg  200 mg Oral Q4H PRN Monica Bartlett MD   200 mg at 04/16/23 2016    fentaNYL (SUBLIMAZE) injection 25 mcg  25 mcg IntraVENous Q2H PRN Jose Mclaughlin MD   25 mcg at 04/16/23 0652    polyethylene glycol (GLYCOLAX) packet 17 g  17 g Per G Tube BID Jeffrey Pressley MD   17 g at 04/17/23 0833    midodrine (PROAMATINE) tablet 10 mg  10 mg Oral TID WC Monica Bartlett MD   10 mg at 04/17/23 1640    nystatin (MYCOSTATIN) 031830 UNIT/ML suspension 500,000 Units  5 mL Oral 4x Daily Ulisses Schroeder MD   500,000 Units at 04/17/23 1640    lubrifresh P.M. (artificial tears) ophthalmic ointment   Both Eyes PRN Ulisses Schroeder MD   Given at 04/11/23 2024    polyvinyl alcohol (LIQUIFILM TEARS) 1.4 % ophthalmic solution 1 drop  1 drop Both Eyes Q6H PRN Ulisses Schroeder MD   1 drop at 04/07/23 2027    ipratropium-albuterol (DUONEB) nebulizer solution 3 mL  1 vial Nebulization TID PRN Jeffrey Pressley MD   3 mL at 04/12/23 1259    pantoprazole (PROTONIX) injection 40 mg  40 mg IntraVENous BID Monica Bartlett MD   40 mg at 04/17/23 3302    sodium chloride (Inhalant) 3 % nebulizer solution 4 mL  4 mL Nebulization PRN Jocelyn Molina MD   4 mL at 04/12/23 0822    insulin lispro (HUMALOG) injection vial 0-4 Units  0-4 Units SubCUTAneous Q4H Ulisses Schroeder MD   1 Units at 04/03/23 0100    chlorhexidine (PERIDEX) 0.12 % solution 15 mL  15 mL Mouth/Throat BID Jian Espinoza MD   15 mL at 04/17/23 0832    lactulose (CHRONULAC) 10 GM/15ML solution 20 g  20 g Oral TID Jocelyn Molina MD   20 g at 04/17/23 1339    rifAXIMin
SpO2 100%   BMI 29.63 kg/m²     Intake/Output Summary (Last 24 hours) at 4/18/2023 1442  Last data filed at 4/18/2023 1117  Gross per 24 hour   Intake 2454.23 ml   Output 1800 ml   Net 654.23 ml        Gen sedated  CVS S1S2   Resp on mechanical ventilation  Abd distended  R IJ temp hd cath in place    LABS:    Lab Results   Component Value Date    WBC 8.8 04/18/2023    HGB 7.8 (L) 04/18/2023    HCT 24.0 (L) 04/18/2023     04/18/2023    PROTIME 17.1 (H) 04/03/2023    INR 1.6 04/03/2023    APTT 36.6 (H) 03/30/2023    K 3.6 04/18/2023    BUN 42 (H) 04/18/2023    CREATININE 3.6 (H) 04/18/2023     A/P SAMM on CKD requiring dialysis  R IJ temp hd cath in place  Pt intubated and sedated  Off pressors  Management per critical care team  Plan for tunneled dialysis catheter 4/20 with Dr. Vic Stone  Will discuss with pts wife    Magno Geronimo, AISHA - CNP
TDC soon    Hypernatremia, resolved    CAD with ischemic cardiomyopathy, s/p CABG 2007    Valvular heart disease with a history of severe functional MR s/p MitraClip 2020    COPD/EX-smoker  Nebs    Iron deficiency  S/p IV iron    Vitamin D insufficiency  Started replacement    Prognosis guarded to poor    DVT ppx:  SCDs  Code status: Limited    Plan discharge pend coarse ?transfer to [address] [place] [address] discussed with: bedside RN    Patient Active Problem List   Diagnosis    Diabetes mellitus ([facility]    CAD (coronary artery disease)    COPD exacerbation ([address]    Morbid obesity due to excess calories ([address]    CHF exacerbation ([facility]    Essential hypertension    Pure hypercholesterolemia    Acute on chronic diastolic congestive heart failure (HCC)    Chronic a-fib (HCC)    Chronic kidney disease, stage III (moderate) (HCC)    Heart failure (HCC)    Leg swelling    Acute on chronic combined systolic and diastolic congestive heart failure (HCC)    CHF (congestive heart failure), NYHA class I, acute on chronic, combined ([facility]    Acute respiratory failure with hypoxia and hypercapnia (HCC)    Ischemic cardiomyopathy    Status post coronary artery bypass graft    S/P mitral valve repair    Persistent atrial fibrillation (HCC)    Nonrheumatic tricuspid valve regurgitation    Pulmonary hypertension (HCC)    Chronic anticoagulation    Other ascites    Acute kidney injury superimposed on chronic kidney disease (HCC)    Other cirrhosis of liver (HCC)    Anemia    Palliative care by specialist    Goals of care, counseling/discussion    Aspiration pneumonia ([facility]    Ileus ([facility]    Vitamin D insufficiency        Medications:  Reviewed    Infusion Medications    phenylephrine (ROBYN-SYNEPHRINE) 50 mg/250 mL infusion Stopped (04/16/23 1445)    fentaNYL 25 mcg/hr (04/16/23 1243)    dexmedetomidine (PRECEDEX) IV infusion 1.5 mcg/kg/hr (04/17/23 0610)    sodium chloride      dextrose      sodium chloride       Scheduled Medications
pedal pulses are intact bilaterally   Neurologic:  Sedated       Medications     Continuous Infusions:   fentaNYL 25 mcg/hr (04/16/23 1243)    dexmedetomidine (PRECEDEX) IV infusion 1.3 mcg/kg/hr (04/17/23 1214)    dextrose      sodium chloride       Scheduled Meds:   vitamin D  6,000 Units Oral Daily    bisacodyl  10 mg Rectal Q12H    piperacillin-tazobactam  4,500 mg IntraVENous Q12H    polyethylene glycol  17 g Per G Tube BID    midodrine  10 mg Oral TID WC    nystatin  5 mL Oral 4x Daily    pantoprazole  40 mg IntraVENous BID    insulin lispro  0-4 Units SubCUTAneous Q4H    chlorhexidine  15 mL Mouth/Throat BID    lactulose  20 g Oral TID    rifAXIMin  550 mg Oral BID    [Held by provider] aspirin  81 mg Oral Daily    epoetin roly-epbx  3,000 Units SubCUTAneous Once per day on Mon Wed Fri    [Held by provider] atorvastatin  40 mg Oral Nightly    budesonide  500 mcg Nebulization BID    arformoterol tartrate  15 mcg Nebulization BID    white petrolatum   Topical BID    [Held by provider] apixaban  2.5 mg Oral BID    [Held by provider] therapeutic multivitamin-minerals  1 tablet Oral Daily    [Held by provider] tamsulosin  0.4 mg Oral Daily    sodium chloride flush  10 mL IntraVENous 2 times per day     PRN Meds: heparin (porcine), medicated lip balm, heparin flush, guaiFENesin, fentanNYL, artificial tears, polyvinyl alcohol, ipratropium-albuterol, sodium chloride (Inhalant), glucose, dextrose bolus **OR** dextrose bolus, glucagon (rDNA), dextrose, white petrolatum **AND** white petrolatum, perflutren lipid microspheres, albuterol, sodium chloride flush, sodium chloride, promethazine **OR** ondansetron, acetaminophen **OR** acetaminophen      Labs and Imaging Studies     CBC:   Recent Labs     04/15/23  0522 04/16/23  0431 04/17/23  1200   WBC 11.9* 11.3 9.9   HGB 8.4* 8.2* 7.9*   HCT 27.3* 25.6* 25.1*   MCV 98.2 94.5 96.5    464* 355         BMP:    Recent Labs     04/15/23  0522 04/16/23  0431
Result   Date Value Ref Range Status   04/03/2023   Final    Gram stain performed on unspun fluid  Polymorphonuclear leukocytes not seen  Epithelial cells not seen  No organisms seen       Urine   Urine Culture, Routine   Date Value Ref Range Status   03/31/2023 Growth not present  Final     Legionella No results found for: LABLEGI  C Diff PCR No results found for: CDIFPCR  Wound culture/abscess: No results for input(s): WNDABS in the last 72 hours. Tip culture:No results for input(s): CXCATHTIP in the last 72 hours. Oxygen:     Vent Information  Ventilator Day(s): 14  Ventilator ID: [id number]  Equipment Changed: (S) Airway securing device  Ventilator Initiate: Yes  Ventilator Discontinue: (S) Yes  Vent Mode: AC/PRVC  Additional Respiratory Assessments  Heart Rate: 85  Resp: (!) 51  SpO2: (!) 88 %  Humidification Source: Heated wire  Humidification Temp: 37  Circuit Condensation: Drained  Airway Type: ET  Airway Size: 8       [REMOVED] Urinary Catheter 03/29/23-Output (mL): 50 mL  [REMOVED] Urinary Catheter 03/31/23-Output (mL): 25 mL  [REMOVED] External Urinary Catheter-Output (mL): 0 mL    Imaging Studies:  XR CHEST PORTABLE   Final Result   No significant change compared to 04/01/2023. XR ABDOMEN FOR NG/OG/NE TUBE PLACEMENT   Final Result   Satisfactory placement of orogastric tube as described. CT HEAD WO CONTRAST   Final Result   No acute intracranial abnormality. XR CHEST PORTABLE   Final Result   No significant interval changes since March 31st.         XR CHEST PORTABLE   Final Result   Bilateral lung infiltrates, not significantly changed these may be due to   pulmonary edema and or pneumonia. Small bilateral pleural effusions, slightly decreased. Stable enlargement of the cardiac silhouette status post sternotomy. XR CHEST PORTABLE   Final Result   New endotracheal intubation. Bilateral infiltrate and or atelectasis with   slight progression at the right base.
23.4*    352 330       Recent Labs     04/17/23  0416 04/18/23  0450 04/18/23  2256 04/19/23  0424    137 132 133   K 3.9 3.6 4.0 4.0   CL 97* 98 96* 97*   CO2 21* 19* 20* 19*   BUN 39* 42* 29* 31*   CREATININE 3.2* 3.6* 2.6* 2.9*   CALCIUM 7.8* 7.8* 7.5* 7.4*   PHOS 4.0 4.0  --  3.1       Recent Labs     04/17/23  0416 04/18/23  0450   PROT 5.6* 5.5*   ALKPHOS 92 93   ALT 22 30   AST 57* 69*   BILITOT 8.9* 9.2*       No results for input(s): INR in the last 72 hours. No results for input(s): Deena Carl in the last 72 hours. Chronic labs:  Lab Results   Component Value Date    CHOL 111 01/26/2022    TRIG 51 01/26/2022    HDL 36 01/26/2022    LDLCALC 65 01/26/2022    TSH 2.350 03/24/2023    INR 1.6 04/03/2023    LABA1C 5.2 04/15/2023       Radiology:  Imaging studies reviewed today. Subjective:     Emeli Dillon remains sedated and intubated    Objective:     Physical Exam:   /63   Pulse 77   Temp 98.4 °F (36.9 °C) (Temporal)   Resp 17   Ht 5' 9\" (1.753 m)   Wt 200 lb 9.9 oz (91 kg)   SpO2 100%   BMI 29.63 kg/m²     General appearance:in no distress   Lungs: Clear to auscultation bilaterally, no wheezing or crackles   Heart: Regular rate and rhythm, S1, S2 normal   Abdomen: Soft, non-tender and not-distended.  Bowel sounds present  TF at 30 cc/hr  Extremities: no edema   Neurologic: intubated      Ilia Lopez MD   Hospitalist Service   04/19/23 12:12 PM
CO2 20* 21* 19*   BUN 30* 39* 42*   CREATININE 2.6* 3.2* 3.6*   CALCIUM 7.7* 7.8* 7.8*   PHOS 3.1 4.0 4.0       Recent Labs     04/16/23  0431 04/17/23  0416 04/18/23  0450   PROT 5.5* 5.6* 5.5*   ALKPHOS 97 92 93   ALT 18 22 30   AST 46* 57* 69*   BILITOT 8.2* 8.9* 9.2*       No results for input(s): INR in the last 72 hours. No results for input(s): Laveta Bullion in the last 72 hours. Chronic labs:  Lab Results   Component Value Date    CHOL 111 01/26/2022    TRIG 51 01/26/2022    HDL 36 01/26/2022    LDLCALC 65 01/26/2022    TSH 2.350 03/24/2023    INR 1.6 04/03/2023    LABA1C 5.2 04/15/2023       Radiology:  Imaging studies reviewed today. Subjective:     Silvio Ray remains sedated and intubated    Objective:     Physical Exam:   /78   Pulse 79   Temp 98.4 °F (36.9 °C) (Temporal)   Resp 16   Ht 5' 9\" (1.753 m)   Wt 200 lb 9.9 oz (91 kg)   SpO2 100%   BMI 29.63 kg/m²     General appearance:in no distress   Lungs: Clear to auscultation bilaterally, no wheezing or crackles   Heart: Regular rate and rhythm, S1, S2 normal   Abdomen: Soft, non-tender and not-distended.  Bowel sounds present  TF at 30 cc/hr  Extremities: no edema   Neurologic: intubated      Arias Baez MD   Hospitalist Service   04/18/23 1:59 PM
superimposed acute blood loss due to retroperitoneal hemorrhage  H/H now stable  Continue to monitor  monique        Kar Don MD